# Patient Record
Sex: MALE | NOT HISPANIC OR LATINO | URBAN - METROPOLITAN AREA
[De-identification: names, ages, dates, MRNs, and addresses within clinical notes are randomized per-mention and may not be internally consistent; named-entity substitution may affect disease eponyms.]

---

## 2017-02-16 ENCOUNTER — EMERGENCY (EMERGENCY)
Facility: HOSPITAL | Age: 62
LOS: 0 days | Discharge: HOME | End: 2017-02-16
Attending: EMERGENCY MEDICINE
Payer: COMMERCIAL

## 2017-02-16 VITALS
SYSTOLIC BLOOD PRESSURE: 113 MMHG | HEART RATE: 63 BPM | OXYGEN SATURATION: 99 % | RESPIRATION RATE: 18 BRPM | TEMPERATURE: 99 F | DIASTOLIC BLOOD PRESSURE: 54 MMHG

## 2017-02-16 DIAGNOSIS — R58 HEMORRHAGE, NOT ELSEWHERE CLASSIFIED: ICD-10-CM

## 2017-02-16 DIAGNOSIS — Z04.1 ENCOUNTER FOR EXAMINATION AND OBSERVATION FOLLOWING TRANSPORT ACCIDENT: ICD-10-CM

## 2017-02-16 PROCEDURE — 99284 EMERGENCY DEPT VISIT MOD MDM: CPT

## 2017-02-16 PROCEDURE — 70450 CT HEAD/BRAIN W/O DYE: CPT | Mod: 26

## 2017-02-16 PROCEDURE — 76376 3D RENDER W/INTRP POSTPROCES: CPT | Mod: 26

## 2017-02-16 PROCEDURE — 72125 CT NECK SPINE W/O DYE: CPT | Mod: 26
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
## 2017-02-16 NOTE — ED PROVIDER NOTE - ENMT, MLM
Airway patent, Nasal mucosa clear. Mouth with normal mucosa. Throat has no vesicles, no oropharyngeal exudates and uvula is midline. + small 2cm ecchymosis, faint to left temporal. + faint ecchymosis to left cheek

## 2017-02-16 NOTE — ED ADULT NURSE NOTE - ADDITIONAL PRINTED INSTRUCTIONS GIVEN
discharged home, discharged instructions given to direct support staff to follow up wit primary md, she verbalized understanding of isdorin

## 2017-02-16 NOTE — ED PROVIDER NOTE - MEDICAL DECISION MAKING DETAILS
Pt well appearing. CT with no acute pathology + likely meningioma. d/w results with aid as well as written on instructions. Discussed results and outcome of testing with the patient.  Patient given copy of available results. Patient advised to please follow up with their PMD within the next 24 hours and return to the Emergency Department for worsening symptoms or any other concerns.

## 2017-08-29 ENCOUNTER — EMERGENCY (EMERGENCY)
Facility: HOSPITAL | Age: 62
LOS: 1 days | Discharge: HOME | End: 2017-08-29
Admitting: EMERGENCY MEDICINE
Payer: MEDICARE

## 2017-08-29 VITALS
TEMPERATURE: 98 F | OXYGEN SATURATION: 99 % | HEART RATE: 70 BPM | DIASTOLIC BLOOD PRESSURE: 73 MMHG | SYSTOLIC BLOOD PRESSURE: 114 MMHG | RESPIRATION RATE: 18 BRPM

## 2017-08-29 VITALS
SYSTOLIC BLOOD PRESSURE: 110 MMHG | DIASTOLIC BLOOD PRESSURE: 67 MMHG | HEART RATE: 71 BPM | RESPIRATION RATE: 18 BRPM | OXYGEN SATURATION: 98 %

## 2017-08-29 DIAGNOSIS — F43.21 ADJUSTMENT DISORDER WITH DEPRESSED MOOD: ICD-10-CM

## 2017-08-29 DIAGNOSIS — F70 MILD INTELLECTUAL DISABILITIES: ICD-10-CM

## 2017-08-29 PROCEDURE — 99283 EMERGENCY DEPT VISIT LOW MDM: CPT | Mod: GC

## 2017-08-29 PROCEDURE — 90792 PSYCH DIAG EVAL W/MED SRVCS: CPT | Mod: GC

## 2017-08-29 NOTE — ED BEHAVIORAL HEALTH ASSESSMENT NOTE - SUMMARY
62 year old male with reported history of mood disorder, psychotic disorder, PTSD, anxiety, mild intellectual disability, presenting with rectal injury from inserting his finger in his rectum, who endorsed SI. Patient does not have SI currently, although he has thoughts of injuring himself. He endorses intermittent sadness related to his situation, which caused him to injure himself by putting his finger in his rectum. He did not injure himself in a way that is lethal, and he has a good support network in the staff at the residence. While he relates his thought of hurting himself to the fact that he says staff is threatening him, he has reportedly made claims like this in the past, which were found to be unfounded. He explains this by stating he hears their voices in his R ear, which is not consistent with psychosis. The patient likely has an adjustment disorder with depressed mood related to his situation, causing him to cause minor injury to himself. He does not endorse a thought of injuring himself in a lethal way and has no thoughts of injuring others. He is not an acute risk to himself or others. 62 year old male with reported history of mood disorder, psychotic disorder, PTSD, anxiety, mild intellectual disability, presenting with rectal injury from inserting his finger in his rectum, who endorsed SI. Patient does not have SI currently, although he has thoughts of injuring himself. He endorses intermittent sadness related to his situation, which caused him to injure himself by putting his finger in his rectum. He did not injure himself in a way that is lethal, has no plan to injure himself in a lethal way, and he has a good support network in the staff at the residence. While he relates his thought of hurting himself to the fact that he says staff is threatening him, he has reportedly made claims like this in the past, which were found to be unfounded. He explains this by stating he hears their voices in his R ear, which is not consistent with psychosis. The patient likely has an adjustment disorder with depressed mood related to his situation, causing him to cause minor injury to himself. He does not endorse a thought of injuring himself in a lethal way and has no thoughts of injuring others. He is not an acute risk to himself or others. 62 year old male with reported history of mood disorder, psychotic disorder, PTSD, anxiety, mild intellectual disability, presenting with rectal injury from inserting his finger in his rectum, who endorsed SI. Patient does not have SI currently, although he has thoughts of injuring himself. He endorses intermittent sadness related to his situation, which caused him to injure himself by putting his finger in his rectum. He did not injure himself in a way that is lethal, has no plan to injure himself in a lethal way, and he has a good support network in the staff at the residence. While he relates his thought of hurting himself to the fact that he says staff is threatening him, he has reportedly made claims like this in the past, which were found to be unfounded. He explains this by stating he hears their voices in his R ear, which is not consistent with psychosis. The patient likely has an adjustment disorder with depressed mood related to his situation, causing him to cause minor injury to himself. He does not endorse a thought of injuring himself in a lethal way and has no thoughts of injuring others. He is not an acute risk to himself or others. Patient is not thought to be at risk from his staff. 62 year old male with reported history of mood disorder, psychotic disorder, PTSD, anxiety, mild intellectual disability, presenting with rectal injury from inserting his finger in his rectum, who endorsed SI. Patient does not have SI currently, although he has thoughts of slapping himself on the head. He endorses intermittent sadness related to his situation, which caused him to injure himself by putting his finger in his rectum. He did not injure himself in a way that is lethal, has no plan to injure himself in a lethal way, and he has a good support network in the staff at the residence. While he relates his thought of hurting himself to the fact that he says staff is threatening him, he has reportedly made claims like this in the past, which were found to be unfounded. He explains this by stating he hears their voices in his R ear, which is not consistent with psychosis. The patient likely has an adjustment disorder with depressed mood related to his situation, causing him to cause minor injury to himself. He does not endorse a thought of injuring himself in a lethal way and has no thoughts of injuring others. He is not an acute risk to himself or others. Patient is not thought to be at risk from his staff.

## 2017-08-29 NOTE — ED PROVIDER NOTE - RECTAL
Lacho almaguer : + small abrasion noted to superior rectal area without active bleeding. nontender to palpation.  no swelling or fluctuance.
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
## 2017-08-29 NOTE — ED PROVIDER NOTE - NOTES
Discussed with psychiatry consult service - Psychiatry attedning asking for patient to remain in intake for time being due to agitated patient in  - will see patient in intake. Discussed with psychiatry consult service - Psychiatry attedning asking for patient to remain in intake for time being due to agitated patient in  - will see patient in intake. Pt. currently with two group home staff members at bedside resting comfortably and is not agitated.

## 2017-08-29 NOTE — ED PROVIDER NOTE - OBJECTIVE STATEMENT
63 y/o male hs high functioning MR, mood disorder, psychotic disorder, anxiety presents to ED c/o rectal bleeding s/p scratching rectal area. As per staff ([facility] people services) who are familiar with patient - pt has history of scratching rectal area and causing abrasions and has happened many times before. bleeding has stopped since but sent in for eval. While evaluating patient and asked about why he scratched himself - patient responding saying "I want to die" and "I want to kill myself" but unable to elaborate and plan. denies HI or halucinations but patient poor historian. As per staff patient has stated these things in the past and say it is for attention but unable to elaborate for further collateral information.
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
## 2017-08-29 NOTE — ED BEHAVIORAL HEALTH ASSESSMENT NOTE - MEDICATIONS (PRESCRIPTIONS, DIRECTIONS)
Abilify 20 mg PO daily, Depakote 1000 mg PO BID, Celexa 40 mg PO daily, Cogentin 1 mg PO BID. c/w medication at prescribed by outpatient provider=Abilify 20 mg PO daily, Depakote 1000 mg PO BID, Celexa 40 mg PO daily, Cogentin 1 mg PO BID.

## 2017-08-29 NOTE — ED BEHAVIORAL HEALTH ASSESSMENT NOTE - DIFFERENTIAL
Adjustment disorder with depressed mood  Mild intellectual disability  Malingering  Unspecified psychosis

## 2017-08-29 NOTE — ED BEHAVIORAL HEALTH ASSESSMENT NOTE - RISK ASSESSMENT
The patient has chronic risk factors, including reported history of psychosis, reported history of mood disorder, reported history of PTSD, reported history of SIB. He has acute risk factors, including recent SIB and recent suicidal statement. He has protective factors, including supportive staff at his residence and future orientation. Patient does not have SI/HI/I/P currently and is not an acute risk to himself or others.

## 2017-08-29 NOTE — ED ADULT NURSE REASSESSMENT NOTE - GENERAL PATIENT STATE
anxious/cooperative/no change observed/pt states he is afraid and doesn't want to be with staff members from facility, pt also incontent of large amt of urine. clothing removed , pt cleaned and placed in  gown and pants, transported to  for constant observation, staff not with patient at this time. vs as documented

## 2017-08-29 NOTE — ED BEHAVIORAL HEALTH ASSESSMENT NOTE - CURRENT MEDICATION
Abilify 20 mg PO daily, Cogentin 1 mg PO BID, Celexa 40 mg PO daily, Depakote 1000 mg PO BID, acetazolamide 500 mg PPO BID, colace 100 mg PO TID, ferrous sulfate 325 mg pO daily.
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
## 2017-08-29 NOTE — ED BEHAVIORAL HEALTH ASSESSMENT NOTE - SAFETY PLAN DETAILS
Patient to return to the ED if he has any SI/HI/I/P. Patient to return to the ED if he has any emergency including SI/HI/I/P.

## 2017-11-09 ENCOUNTER — INPATIENT (INPATIENT)
Facility: HOSPITAL | Age: 62
LOS: 14 days | Discharge: NURSING, REHAB OR LONG-TERM CARE | End: 2017-11-24
Attending: HOSPITALIST | Admitting: HOSPITALIST
Payer: MEDICARE

## 2017-11-09 VITALS
HEART RATE: 80 BPM | OXYGEN SATURATION: 100 % | RESPIRATION RATE: 18 BRPM | SYSTOLIC BLOOD PRESSURE: 104 MMHG | DIASTOLIC BLOOD PRESSURE: 70 MMHG | TEMPERATURE: 98 F

## 2017-11-09 DIAGNOSIS — Z96.649 PRESENCE OF UNSPECIFIED ARTIFICIAL HIP JOINT: Chronic | ICD-10-CM

## 2017-11-09 LAB
ALBUMIN SERPL ELPH-MCNC: 3 G/DL — LOW (ref 3.3–5)
ALP SERPL-CCNC: 70 U/L — SIGNIFICANT CHANGE UP (ref 40–120)
ALT FLD-CCNC: 19 U/L — SIGNIFICANT CHANGE UP (ref 4–41)
AST SERPL-CCNC: 53 U/L — HIGH (ref 4–40)
BASOPHILS # BLD AUTO: 0.03 K/UL — SIGNIFICANT CHANGE UP (ref 0–0.2)
BASOPHILS NFR BLD AUTO: 0.3 % — SIGNIFICANT CHANGE UP (ref 0–2)
BILIRUB SERPL-MCNC: 0.9 MG/DL — SIGNIFICANT CHANGE UP (ref 0.2–1.2)
BUN SERPL-MCNC: 25 MG/DL — HIGH (ref 7–23)
CALCIUM SERPL-MCNC: 8.4 MG/DL — SIGNIFICANT CHANGE UP (ref 8.4–10.5)
CHLORIDE SERPL-SCNC: 108 MMOL/L — HIGH (ref 98–107)
CO2 SERPL-SCNC: 22 MMOL/L — SIGNIFICANT CHANGE UP (ref 22–31)
CREAT SERPL-MCNC: 0.8 MG/DL — SIGNIFICANT CHANGE UP (ref 0.5–1.3)
CRP SERPL-MCNC: 76.8 MG/L — HIGH
EOSINOPHIL # BLD AUTO: 0.08 K/UL — SIGNIFICANT CHANGE UP (ref 0–0.5)
EOSINOPHIL NFR BLD AUTO: 0.9 % — SIGNIFICANT CHANGE UP (ref 0–6)
ERYTHROCYTE [SEDIMENTATION RATE] IN BLOOD: 2 MM/HR — SIGNIFICANT CHANGE UP (ref 1–15)
GLUCOSE SERPL-MCNC: 96 MG/DL — SIGNIFICANT CHANGE UP (ref 70–99)
HCT VFR BLD CALC: 44.1 % — SIGNIFICANT CHANGE UP (ref 39–50)
HGB BLD-MCNC: 15.3 G/DL — SIGNIFICANT CHANGE UP (ref 13–17)
IMM GRANULOCYTES # BLD AUTO: 0.06 # — SIGNIFICANT CHANGE UP
IMM GRANULOCYTES NFR BLD AUTO: 0.7 % — SIGNIFICANT CHANGE UP (ref 0–1.5)
LYMPHOCYTES # BLD AUTO: 2.78 K/UL — SIGNIFICANT CHANGE UP (ref 1–3.3)
LYMPHOCYTES # BLD AUTO: 30.5 % — SIGNIFICANT CHANGE UP (ref 13–44)
MCHC RBC-ENTMCNC: 33.8 PG — SIGNIFICANT CHANGE UP (ref 27–34)
MCHC RBC-ENTMCNC: 34.7 % — SIGNIFICANT CHANGE UP (ref 32–36)
MCV RBC AUTO: 97.4 FL — SIGNIFICANT CHANGE UP (ref 80–100)
MONOCYTES # BLD AUTO: 1.13 K/UL — HIGH (ref 0–0.9)
MONOCYTES NFR BLD AUTO: 12.4 % — SIGNIFICANT CHANGE UP (ref 2–14)
NEUTROPHILS # BLD AUTO: 5.04 K/UL — SIGNIFICANT CHANGE UP (ref 1.8–7.4)
NEUTROPHILS NFR BLD AUTO: 55.2 % — SIGNIFICANT CHANGE UP (ref 43–77)
NRBC # FLD: 0 — SIGNIFICANT CHANGE UP
PLATELET # BLD AUTO: 138 K/UL — LOW (ref 150–400)
PMV BLD: 11.7 FL — SIGNIFICANT CHANGE UP (ref 7–13)
POTASSIUM SERPL-MCNC: 3.9 MMOL/L — SIGNIFICANT CHANGE UP (ref 3.5–5.3)
POTASSIUM SERPL-SCNC: 3.9 MMOL/L — SIGNIFICANT CHANGE UP (ref 3.5–5.3)
PROT SERPL-MCNC: 5.8 G/DL — LOW (ref 6–8.3)
RBC # BLD: 4.53 M/UL — SIGNIFICANT CHANGE UP (ref 4.2–5.8)
RBC # FLD: 13.8 % — SIGNIFICANT CHANGE UP (ref 10.3–14.5)
SODIUM SERPL-SCNC: 143 MMOL/L — SIGNIFICANT CHANGE UP (ref 135–145)
WBC # BLD: 9.12 K/UL — SIGNIFICANT CHANGE UP (ref 3.8–10.5)
WBC # FLD AUTO: 9.12 K/UL — SIGNIFICANT CHANGE UP (ref 3.8–10.5)

## 2017-11-09 PROCEDURE — 93971 EXTREMITY STUDY: CPT | Mod: 26,LT

## 2017-11-09 PROCEDURE — 73522 X-RAY EXAM HIPS BI 3-4 VIEWS: CPT | Mod: 26

## 2017-11-09 RX ORDER — SODIUM CHLORIDE 9 MG/ML
1000 INJECTION INTRAMUSCULAR; INTRAVENOUS; SUBCUTANEOUS ONCE
Qty: 0 | Refills: 0 | Status: COMPLETED | OUTPATIENT
Start: 2017-11-09 | End: 2017-11-09

## 2017-11-09 RX ORDER — ACETAMINOPHEN 500 MG
1000 TABLET ORAL ONCE
Qty: 0 | Refills: 0 | Status: COMPLETED | OUTPATIENT
Start: 2017-11-09 | End: 2017-11-09

## 2017-11-09 RX ORDER — MORPHINE SULFATE 50 MG/1
4 CAPSULE, EXTENDED RELEASE ORAL ONCE
Qty: 0 | Refills: 0 | Status: DISCONTINUED | OUTPATIENT
Start: 2017-11-09 | End: 2017-11-09

## 2017-11-09 RX ADMIN — MORPHINE SULFATE 4 MILLIGRAM(S): 50 CAPSULE, EXTENDED RELEASE ORAL at 23:21

## 2017-11-09 RX ADMIN — MORPHINE SULFATE 4 MILLIGRAM(S): 50 CAPSULE, EXTENDED RELEASE ORAL at 22:17

## 2017-11-09 RX ADMIN — Medication 1000 MILLIGRAM(S): at 23:43

## 2017-11-09 RX ADMIN — Medication 400 MILLIGRAM(S): at 23:21

## 2017-11-09 RX ADMIN — SODIUM CHLORIDE 1000 MILLILITER(S): 9 INJECTION INTRAMUSCULAR; INTRAVENOUS; SUBCUTANEOUS at 23:40

## 2017-11-09 NOTE — ED ADULT TRIAGE NOTE - CHIEF COMPLAINT QUOTE
EMS states" Patient  missed his day care and when [facility] people services went to check on him , patient is c/o pain to right leg." patient has mild intellectual disability, anxiety, PTSD, psychotic disorder.

## 2017-11-09 NOTE — ED ADULT NURSE NOTE - CHIEF COMPLAINT
The patient is a 62y Male BIBEMS from [facility] c/o hip pain. Pt is unable to fully communicate needs, able to say name and answer yes/no questions.

## 2017-11-09 NOTE — CONSULT NOTE ADULT - SUBJECTIVE AND OBJECTIVE BOX
Orthopaedic Surgery Consult Note    Patient is a 62y old  Male who presents with a chief complaint of right leg pain and inability to ambulate  HPI: 62 year old male with history of mood disorder, mental retardation, left hip fracture treated nonoperatively comes in with 1 day history of inability to ambulate.      PAST MEDICAL & SURGICAL HISTORY:  PTSD (post-traumatic stress disorder)  Constipation  Mood disorder  Mental retardation  S/P hip replacement    [] No significant past history as reviewed with the patient and family    FAMILY HISTORY:    [] Family history not pertinent as reviewed with the patient and family    SOCIAL HISTORY:    MEDICATIONS  (STANDING):    MEDICATIONS  (PRN):    Allergies    No Known Allergies    Intolerances        REVIEW OF SYSTEMS  [x] All review of systems negative except for those marked.  Systemic:	[] Fever		[] Chills		[] Night sweats		[] Fatigue	[] Other  [] Cardiovascular:  [] Pulmonary:  [] Renal/Urologic:  [] Gastrointestinal:  [] Metabolic:  [] Neurologic:  [] Hematologic:  [] ENT:  [] Ophthalmologic:  [] Musculoskeletal: see HPI    Vital Signs Last 24 Hrs  T(C): 36.7 (09 Nov 2017 18:05), Max: 36.7 (09 Nov 2017 18:05)  T(F): 98 (09 Nov 2017 18:05), Max: 98 (09 Nov 2017 18:05)  HR: 75 (09 Nov 2017 23:26) (75 - 85)  BP: 111/75 (09 Nov 2017 23:26) (104/70 - 126/61)  BP(mean): --  RR: 15 (09 Nov 2017 23:26) (15 - 18)  SpO2: 99% (09 Nov 2017 23:26) (98% - 100%)      PHYSICAL EXAM:  NAD  RLE:                           15.3   9.12  )-----------( 138      ( 09 Nov 2017 21:40 )             44.1     11-09    143  |  108<H>  |  25<H>  ----------------------------<  96  3.9   |  22  |  0.80    Ca    8.4      09 Nov 2017 21:40    TPro  5.8<L>  /  Alb  3.0<L>  /  TBili  0.9  /  DBili  x   /  AST  53<H>  /  ALT  19  /  AlkPhos  70  11-09    ESR: 2  CRP: 76.8      IMAGING STUDIES:  X-ray Pelvis/Right femur: old healed left femur fracture  62y Male with right leg pain Orthopaedic Surgery Consult Note    Patient is a 62y old  Male who presents with a chief complaint of right leg pain and inability to ambulate  HPI: 62 year old male with history of mood disorder, mental retardation, left hip fracture treated nonoperatively comes in with 1 day history of inability to ambulate.  History obtained from chart and ER physicians.  Patient refusing to respond to questions or commands.      PAST MEDICAL & SURGICAL HISTORY:  PTSD (post-traumatic stress disorder)  Constipation  Mood disorder  Mental retardation  S/P hip replacement    [] No significant past history as reviewed with the patient and family    FAMILY HISTORY:    [] Family history not pertinent as reviewed with the patient and family    SOCIAL HISTORY:    MEDICATIONS  (STANDING):    MEDICATIONS  (PRN):    Allergies    No Known Allergies    Intolerances        REVIEW OF SYSTEMS  [x] All review of systems negative except for those marked.  Systemic:	[] Fever		[] Chills		[] Night sweats		[] Fatigue	[] Other  [] Cardiovascular:  [] Pulmonary:  [] Renal/Urologic:  [] Gastrointestinal:  [] Metabolic:  [] Neurologic:  [] Hematologic:  [] ENT:  [] Ophthalmologic:  [] Musculoskeletal: see HPI    Vital Signs Last 24 Hrs  T(C): 36.7 (09 Nov 2017 18:05), Max: 36.7 (09 Nov 2017 18:05)  T(F): 98 (09 Nov 2017 18:05), Max: 98 (09 Nov 2017 18:05)  HR: 75 (09 Nov 2017 23:26) (75 - 85)  BP: 111/75 (09 Nov 2017 23:26) (104/70 - 126/61)  BP(mean): --  RR: 15 (09 Nov 2017 23:26) (15 - 18)  SpO2: 99% (09 Nov 2017 23:26) (98% - 100%)      PHYSICAL EXAM:  NAD  B/L LE: grimacing with ROM of hips or knees  No effusion of knees.  NO TTP hips/knees  Unable to assess motor/neuro exam secondary to patient not following commands.                        15.3   9.12  )-----------( 138      ( 09 Nov 2017 21:40 )             44.1     11-09    143  |  108<H>  |  25<H>  ----------------------------<  96  3.9   |  22  |  0.80    Ca    8.4      09 Nov 2017 21:40    TPro  5.8<L>  /  Alb  3.0<L>  /  TBili  0.9  /  DBili  x   /  AST  53<H>  /  ALT  19  /  AlkPhos  70  11-09    ESR: 2  CRP: 76.8      IMAGING STUDIES:  X-ray Pelvis/Right femur: old healed left femur fracture, no acute fractures or dislocations  62y Male with right leg pain  Low concern for septic hip given isolated CRP elevation with no fevers, elevated ESR or wbc  PT/OT/OOB  Pain Control  No acute surgical intervention.  Care per primary team.

## 2017-11-09 NOTE — ED ADULT NURSE NOTE - CHIEF COMPLAINT QUOTE
EMS states" Patient  missed his day care and when [facility] people services went to check on him , patient is c/o pain to right leg." patient has mild intellectual disability, anxiety, PTSD, psychotic disorder.

## 2017-11-09 NOTE — ED PROVIDER NOTE - ATTENDING CONTRIBUTION TO CARE
61 y/o M w/ a PMHx of mild intellectual disability, mood disorder, psychotic disorder, PTSD, anxiety, and glaucoma sent from group home with R hip pain.   PE: Limited exam, however, no exquisite ttp on exam and no ecchymosis, warmth, or erythema were appreciated; +pain elicited with R hip flexion and flexion/extension of R knee.  -xray showing chronic changes and ortho consulted who has a low concern for septic hip and who recommended no acute surgical intervention.  Will admit for further work-up.  I performed a history and physical exam of the patient and discussed their management with the advanced care provider. I reviewed the advanced care provider's note and agree with the documented findings and plan of care. My medical decision making and objective findings are found above.

## 2017-11-09 NOTE — ED PROVIDER NOTE - NONTENDER LOCATION
left lower quadrant/umbilical/right costovertebral angle/left upper quadrant/right upper quadrant/suprapubic/right lower quadrant/periumbilical/left costovertebral angle

## 2017-11-09 NOTE — ED PROVIDER NOTE - CHPI ED SYMPTOMS NEG
no stiffness/no tingling/no bruising/no fever/no deformity/no weakness/no numbness/no abrasion/no back pain

## 2017-11-09 NOTE — ED PROVIDER NOTE - PROGRESS NOTE DETAILS
IJEOMA Stevens: received sign out from IJEOMA Delong to follow up labs, x-ray and u/s.  Pt returned from u/s pt sleeping, responding to stimuli, vss; pt sleeping likely secondary to morphine.  Pt to be admitted to hospitalist.  Orthopedics to see patient. IJEOMA Stevens: received sign out from IJEOMA Delong to follow up labs, x-ray and u/s.  Pt returned from u/s pt sleeping, responding to stimuli, vss; pt sleeping likely secondary to morphine.  Aide at bedside reports similar episodes in the past when sleeping.  Pt to be admitted to hospitalist.  Orthopedics to see patient. IJEOMA Stevens: pt admitted to hospitalist Dr. Samayoa.  MAR notified.

## 2017-11-09 NOTE — ED PROVIDER NOTE - MEDICAL DECISION MAKING DETAILS
Pt is a 61 y/o M nonsmoker PMHx mild intellectual disability, mood disorder, psychotic disorder, h/o left hip replacement, anxiety p/w right hip and leg pain today -- hip pain, r/o fracture, possible arthritis -- labs, xray, pain control, esr, reassess, if unable to ambulate, admit

## 2017-11-09 NOTE — ED ADULT NURSE NOTE - OBJECTIVE STATEMENT
Received pt in room intake 7, pt A&Ox1, respirations even and unlabored b/l. Pt arrived to room, urinated on self, pt changed to gown and cleaned. Skin dry and intact. IVL 20g Angiocath placed on right AC. Labs sent. Pt taken to xray. Will continue to monitor. Received pt in room intake 7, pt A&Ox1, respirations even and unlabored b/l. Pt arrived to room, urinated on self, pt changed to gown and cleaned. Skin dry and intact. Pt tremulous, unable to fully extend upper extremities b/l. IVL 20g Angiocath placed on right AC. Labs sent. Pt taken to xray. Will continue to monitor. Received pt in room intake 7, pt A&Ox1, respirations even and unlabored b/l. Pt arrived to room, urinated on self, pt changed to gown and cleaned. Skin dry and intact. Pt tremulous, unable to fully extend upper extremities b/l. IVL 20g Angiocath placed on right AC. Labs sent. Pt taken to xray. Staff from group home at bedside. Will continue to monitor. Received pt in room intake 7, pt A&Ox1, respirations even and unlabored b/l. Pt arrived to room, urinated on self, pt changed to gown and cleaned. Blanchable redness on sacrum and buttocks. Pt tremulous, unable to fully extend upper extremities b/l. IVL 20g Angiocath placed on right AC. Labs sent. Pt taken to xray. Staff from group home at bedside. Will continue to monitor.

## 2017-11-10 DIAGNOSIS — M79.604 PAIN IN RIGHT LEG: ICD-10-CM

## 2017-11-10 DIAGNOSIS — K59.00 CONSTIPATION, UNSPECIFIED: ICD-10-CM

## 2017-11-10 DIAGNOSIS — M25.551 PAIN IN RIGHT HIP: ICD-10-CM

## 2017-11-10 DIAGNOSIS — F39 UNSPECIFIED MOOD [AFFECTIVE] DISORDER: ICD-10-CM

## 2017-11-10 DIAGNOSIS — H40.9 UNSPECIFIED GLAUCOMA: ICD-10-CM

## 2017-11-10 DIAGNOSIS — Z29.9 ENCOUNTER FOR PROPHYLACTIC MEASURES, UNSPECIFIED: ICD-10-CM

## 2017-11-10 LAB
ALBUMIN SERPL ELPH-MCNC: 2.6 G/DL — LOW (ref 3.3–5)
ALP SERPL-CCNC: 64 U/L — SIGNIFICANT CHANGE UP (ref 40–120)
ALT FLD-CCNC: 18 U/L — SIGNIFICANT CHANGE UP (ref 4–41)
AST SERPL-CCNC: 48 U/L — HIGH (ref 4–40)
BILIRUB SERPL-MCNC: 0.7 MG/DL — SIGNIFICANT CHANGE UP (ref 0.2–1.2)
BUN SERPL-MCNC: 23 MG/DL — SIGNIFICANT CHANGE UP (ref 7–23)
CALCIUM SERPL-MCNC: 8 MG/DL — LOW (ref 8.4–10.5)
CHLORIDE SERPL-SCNC: 109 MMOL/L — HIGH (ref 98–107)
CK SERPL-CCNC: 500 U/L — HIGH (ref 30–200)
CO2 SERPL-SCNC: 22 MMOL/L — SIGNIFICANT CHANGE UP (ref 22–31)
CREAT SERPL-MCNC: 0.69 MG/DL — SIGNIFICANT CHANGE UP (ref 0.5–1.3)
GLUCOSE SERPL-MCNC: 78 MG/DL — SIGNIFICANT CHANGE UP (ref 70–99)
MAGNESIUM SERPL-MCNC: 2 MG/DL — SIGNIFICANT CHANGE UP (ref 1.6–2.6)
PHOSPHATE SERPL-MCNC: 3.7 MG/DL — SIGNIFICANT CHANGE UP (ref 2.5–4.5)
POTASSIUM SERPL-MCNC: 3.1 MMOL/L — LOW (ref 3.5–5.3)
POTASSIUM SERPL-SCNC: 3.1 MMOL/L — LOW (ref 3.5–5.3)
PROT SERPL-MCNC: 5.1 G/DL — LOW (ref 6–8.3)
SODIUM SERPL-SCNC: 143 MMOL/L — SIGNIFICANT CHANGE UP (ref 135–145)

## 2017-11-10 PROCEDURE — 72192 CT PELVIS W/O DYE: CPT | Mod: 26

## 2017-11-10 PROCEDURE — 99223 1ST HOSP IP/OBS HIGH 75: CPT | Mod: GC

## 2017-11-10 PROCEDURE — 73502 X-RAY EXAM HIP UNI 2-3 VIEWS: CPT | Mod: 26,LT

## 2017-11-10 PROCEDURE — 73700 CT LOWER EXTREMITY W/O DYE: CPT | Mod: 26,RT

## 2017-11-10 PROCEDURE — 73552 X-RAY EXAM OF FEMUR 2/>: CPT | Mod: 26,RT

## 2017-11-10 RX ORDER — ARIPIPRAZOLE 15 MG/1
20 TABLET ORAL DAILY
Qty: 0 | Refills: 0 | Status: DISCONTINUED | OUTPATIENT
Start: 2017-11-10 | End: 2017-11-24

## 2017-11-10 RX ORDER — CHOLECALCIFEROL (VITAMIN D3) 125 MCG
1000 CAPSULE ORAL DAILY
Qty: 0 | Refills: 0 | Status: DISCONTINUED | OUTPATIENT
Start: 2017-11-10 | End: 2017-11-24

## 2017-11-10 RX ORDER — BENZTROPINE MESYLATE 1 MG
1 TABLET ORAL
Qty: 0 | Refills: 0 | Status: DISCONTINUED | OUTPATIENT
Start: 2017-11-10 | End: 2017-11-24

## 2017-11-10 RX ORDER — DOCUSATE SODIUM 100 MG
100 CAPSULE ORAL THREE TIMES A DAY
Qty: 0 | Refills: 0 | Status: DISCONTINUED | OUTPATIENT
Start: 2017-11-10 | End: 2017-11-24

## 2017-11-10 RX ORDER — ACETAZOLAMIDE 250 MG/1
500 TABLET ORAL
Qty: 0 | Refills: 0 | Status: DISCONTINUED | OUTPATIENT
Start: 2017-11-10 | End: 2017-11-24

## 2017-11-10 RX ORDER — TIMOLOL 0.5 %
1 DROPS OPHTHALMIC (EYE)
Qty: 0 | Refills: 0 | Status: DISCONTINUED | OUTPATIENT
Start: 2017-11-10 | End: 2017-11-24

## 2017-11-10 RX ORDER — BRIMONIDINE TARTRATE 2 MG/MG
1 SOLUTION/ DROPS OPHTHALMIC THREE TIMES A DAY
Qty: 0 | Refills: 0 | Status: DISCONTINUED | OUTPATIENT
Start: 2017-11-10 | End: 2017-11-24

## 2017-11-10 RX ORDER — DIVALPROEX SODIUM 500 MG/1
1000 TABLET, DELAYED RELEASE ORAL
Qty: 0 | Refills: 0 | Status: DISCONTINUED | OUTPATIENT
Start: 2017-11-10 | End: 2017-11-24

## 2017-11-10 RX ORDER — DIVALPROEX SODIUM 500 MG/1
1000 TABLET, DELAYED RELEASE ORAL
Qty: 0 | Refills: 0 | Status: DISCONTINUED | OUTPATIENT
Start: 2017-11-10 | End: 2017-11-10

## 2017-11-10 RX ORDER — SODIUM CHLORIDE 9 MG/ML
1000 INJECTION INTRAMUSCULAR; INTRAVENOUS; SUBCUTANEOUS
Qty: 0 | Refills: 0 | Status: DISCONTINUED | OUTPATIENT
Start: 2017-11-10 | End: 2017-11-15

## 2017-11-10 RX ORDER — POTASSIUM CHLORIDE 20 MEQ
40 PACKET (EA) ORAL ONCE
Qty: 0 | Refills: 0 | Status: DISCONTINUED | OUTPATIENT
Start: 2017-11-10 | End: 2017-11-10

## 2017-11-10 RX ORDER — FERROUS SULFATE 325(65) MG
325 TABLET ORAL DAILY
Qty: 0 | Refills: 0 | Status: DISCONTINUED | OUTPATIENT
Start: 2017-11-10 | End: 2017-11-10

## 2017-11-10 RX ORDER — HEPARIN SODIUM 5000 [USP'U]/ML
5000 INJECTION INTRAVENOUS; SUBCUTANEOUS EVERY 8 HOURS
Qty: 0 | Refills: 0 | Status: DISCONTINUED | OUTPATIENT
Start: 2017-11-10 | End: 2017-11-24

## 2017-11-10 RX ORDER — LATANOPROST 0.05 MG/ML
1 SOLUTION/ DROPS OPHTHALMIC; TOPICAL AT BEDTIME
Qty: 0 | Refills: 0 | Status: DISCONTINUED | OUTPATIENT
Start: 2017-11-10 | End: 2017-11-10

## 2017-11-10 RX ORDER — TIMOLOL 0.5 %
1 DROPS OPHTHALMIC (EYE)
Qty: 0 | Refills: 0 | COMMUNITY

## 2017-11-10 RX ORDER — ACETAMINOPHEN 500 MG
650 TABLET ORAL EVERY 6 HOURS
Qty: 0 | Refills: 0 | Status: DISCONTINUED | OUTPATIENT
Start: 2017-11-10 | End: 2017-11-24

## 2017-11-10 RX ORDER — CITALOPRAM 10 MG/1
40 TABLET, FILM COATED ORAL DAILY
Qty: 0 | Refills: 0 | Status: DISCONTINUED | OUTPATIENT
Start: 2017-11-10 | End: 2017-11-24

## 2017-11-10 RX ORDER — POTASSIUM CHLORIDE 20 MEQ
40 PACKET (EA) ORAL EVERY 4 HOURS
Qty: 0 | Refills: 0 | Status: COMPLETED | OUTPATIENT
Start: 2017-11-10 | End: 2017-11-10

## 2017-11-10 RX ORDER — BENZTROPINE MESYLATE 1 MG
1 TABLET ORAL
Qty: 0 | Refills: 0 | Status: DISCONTINUED | OUTPATIENT
Start: 2017-11-10 | End: 2017-11-10

## 2017-11-10 RX ORDER — OXYCODONE AND ACETAMINOPHEN 5; 325 MG/1; MG/1
1 TABLET ORAL EVERY 6 HOURS
Qty: 0 | Refills: 0 | Status: DISCONTINUED | OUTPATIENT
Start: 2017-11-10 | End: 2017-11-16

## 2017-11-10 RX ORDER — SENNA PLUS 8.6 MG/1
2 TABLET ORAL AT BEDTIME
Qty: 0 | Refills: 0 | Status: DISCONTINUED | OUTPATIENT
Start: 2017-11-10 | End: 2017-11-24

## 2017-11-10 RX ADMIN — Medication 40 MILLIEQUIVALENT(S): at 13:00

## 2017-11-10 RX ADMIN — ARIPIPRAZOLE 20 MILLIGRAM(S): 15 TABLET ORAL at 14:47

## 2017-11-10 RX ADMIN — HEPARIN SODIUM 5000 UNIT(S): 5000 INJECTION INTRAVENOUS; SUBCUTANEOUS at 06:24

## 2017-11-10 RX ADMIN — SENNA PLUS 2 TABLET(S): 8.6 TABLET ORAL at 22:29

## 2017-11-10 RX ADMIN — HEPARIN SODIUM 5000 UNIT(S): 5000 INJECTION INTRAVENOUS; SUBCUTANEOUS at 22:30

## 2017-11-10 RX ADMIN — OXYCODONE AND ACETAMINOPHEN 1 TABLET(S): 5; 325 TABLET ORAL at 16:15

## 2017-11-10 RX ADMIN — OXYCODONE AND ACETAMINOPHEN 1 TABLET(S): 5; 325 TABLET ORAL at 15:17

## 2017-11-10 RX ADMIN — ACETAZOLAMIDE 500 MILLIGRAM(S): 250 TABLET ORAL at 18:31

## 2017-11-10 RX ADMIN — BRIMONIDINE TARTRATE 1 DROP(S): 2 SOLUTION/ DROPS OPHTHALMIC at 22:30

## 2017-11-10 RX ADMIN — Medication 1 DROP(S): at 18:31

## 2017-11-10 RX ADMIN — Medication 10 MILLIGRAM(S): at 22:29

## 2017-11-10 RX ADMIN — Medication 100 MILLIGRAM(S): at 22:29

## 2017-11-10 RX ADMIN — CITALOPRAM 40 MILLIGRAM(S): 10 TABLET, FILM COATED ORAL at 13:00

## 2017-11-10 RX ADMIN — DIVALPROEX SODIUM 1000 MILLIGRAM(S): 500 TABLET, DELAYED RELEASE ORAL at 09:08

## 2017-11-10 RX ADMIN — BRIMONIDINE TARTRATE 1 DROP(S): 2 SOLUTION/ DROPS OPHTHALMIC at 14:47

## 2017-11-10 RX ADMIN — DIVALPROEX SODIUM 1000 MILLIGRAM(S): 500 TABLET, DELAYED RELEASE ORAL at 18:31

## 2017-11-10 RX ADMIN — SODIUM CHLORIDE 100 MILLILITER(S): 9 INJECTION INTRAMUSCULAR; INTRAVENOUS; SUBCUTANEOUS at 12:57

## 2017-11-10 RX ADMIN — OXYCODONE AND ACETAMINOPHEN 1 TABLET(S): 5; 325 TABLET ORAL at 23:15

## 2017-11-10 RX ADMIN — Medication 1 MILLIGRAM(S): at 09:08

## 2017-11-10 RX ADMIN — Medication 40 MILLIEQUIVALENT(S): at 09:16

## 2017-11-10 RX ADMIN — Medication 1 MILLIGRAM(S): at 18:31

## 2017-11-10 RX ADMIN — HEPARIN SODIUM 5000 UNIT(S): 5000 INJECTION INTRAVENOUS; SUBCUTANEOUS at 13:00

## 2017-11-10 RX ADMIN — Medication 650 MILLIGRAM(S): at 10:55

## 2017-11-10 RX ADMIN — OXYCODONE AND ACETAMINOPHEN 1 TABLET(S): 5; 325 TABLET ORAL at 22:34

## 2017-11-10 RX ADMIN — Medication 650 MILLIGRAM(S): at 09:56

## 2017-11-10 RX ADMIN — Medication 100 MILLIGRAM(S): at 13:00

## 2017-11-10 RX ADMIN — Medication 1000 UNIT(S): at 14:47

## 2017-11-10 NOTE — H&P ADULT - PROBLEM SELECTOR PLAN 3
- As noted above, patient's list of eye drops were provided from group home, but potency and frequency were not provided.  - Will call pt's group home in the AM and will start patient's eye drops once missing information is clarified.

## 2017-11-10 NOTE — PROVIDER CONTACT NOTE (OTHER) - ASSESSMENT
RN attempted to have patient urinated by having stand at bedside, dangle, and warm compresses on bladder with no output.  bladder scan showed 364ml in bladder.

## 2017-11-10 NOTE — PROVIDER CONTACT NOTE (OTHER) - SITUATION
RN unsure when patient last urinated, per report was incontinent in the morning but no known output since.

## 2017-11-10 NOTE — PHYSICAL THERAPY INITIAL EVALUATION ADULT - PERTINENT HX OF CURRENT PROBLEM, REHAB EVAL
Pt is a 62 year old male with mental retardation, from a group home and has pain in the right lower extremity

## 2017-11-10 NOTE — H&P ADULT - PROBLEM SELECTOR PLAN 4
- Per paperwork from patient's group home, patient has a history of constipation.  - Will start Senna/Colace

## 2017-11-10 NOTE — H&P ADULT - PROBLEM SELECTOR PLAN 1
- Patient presents to the ED from his group home with complaints of right hip pain and inability to bear weight. No exquisite point tenderness on exam and no ecchymosis, warmth, or erythema were appreciated. - Patient presents to the ED from his group home with complaints of right hip pain and inability to bear weight. Exam was limited; however, there was no exquisite point tenderness on exam and no ecchymosis, warmth, or erythema were appreciated. There was pain elicited with R hip flexion and flexion/extension of R knee. Labs were only significant for an elevated CRP (76.8), but ESR was WNL (2). Prelim read on X-ray showed chronic appearing lesser trochanter fracture is noted with severe R knee joint arthrosis. Patient evaluated by Ortho in the ED who had a low concern for septic hip and who recommended no acute surgical intervention. Unclear if patient's pain is 2/2 severe OA vs prior fracture vs ? myositis.  - Will follow-up final X-ray report. If equivocal, will consider further imaging  - Will check a CK to r/o possible myositis  - Tylenol PRN for pain  - PT consult placed

## 2017-11-10 NOTE — H&P ADULT - NSHPREVIEWOFSYSTEMS_GEN_ALL_CORE
REVIEW OF SYSTEMS **** (Limited as patient grossly noncommunicative) REVIEW OF SYSTEMS **** (Limited as patient grossly noncommunicative) ****  ROS + for right hip pain, RLE pian, inability to bear weight  ROS - for fevers, chills, numbness, weakness, rash, or recent falls REVIEW OF SYSTEMS **** (Limited as patient grossly noncommunicative) ****  ROS + for right hip pain, RLE pian, inability to bear weight  ROS - for fevers, chills, chest pain, abdominal pain, numbness, weakness, rash, or recent falls

## 2017-11-10 NOTE — H&P ADULT - ATTENDING COMMENTS
Patient was seen and evaluated, agree with above with the follow additions    PE: Cardiac S1-S2 with BELINDA II/VI    A/P 62 y.o. man with multiple medical comorbidities now with right hip pain of unclear etiology   - In view of severe right hip pain and negative x-ray, obtain CT of the pelvis to assess for fracture   - If negative, obtain PT evaluation.

## 2017-11-10 NOTE — H&P ADULT - NSHPPHYSICALEXAM_GEN_ALL_CORE
Vital Signs Last 24 Hrs  T(C): 36.8 (10 Nov 2017 03:46), Max: 36.8 (10 Nov 2017 03:46)  T(F): 98.3 (10 Nov 2017 03:46), Max: 98.3 (10 Nov 2017 03:46)  HR: 72 (10 Nov 2017 03:46) (66 - 85)  BP: 121/73 (10 Nov 2017 03:46) (104/70 - 126/61)  BP(mean): --  RR: 16 (10 Nov 2017 03:46) (15 - 18)  SpO2: 100% (10 Nov 2017 03:46) (98% - 100%)    PHYSICAL EXAM:  Constitutional:  Eyes:  ENMT:  Neck:  Breasts:  Back:  Respiratory:  Cardiovascular:  Gastrointestinal:  Genitourinary:  Rectal:  Extremities:  Vascular:  Neurological:  Skin:  Lymph Nodes:  Musculoskeletal:  Psychiatric: Vital Signs Last 24 Hrs  T(C): 36.8 (10 Nov 2017 03:46), Max: 36.8 (10 Nov 2017 03:46)  T(F): 98.3 (10 Nov 2017 03:46), Max: 98.3 (10 Nov 2017 03:46)  HR: 72 (10 Nov 2017 03:46) (66 - 85)  BP: 121/73 (10 Nov 2017 03:46) (104/70 - 126/61)  BP(mean): --  RR: 16 (10 Nov 2017 03:46) (15 - 18)  SpO2: 100% (10 Nov 2017 03:46) (98% - 100%)    PHYSICAL EXAM: (Limited as patient would only occasionally follow commands)  Constitutional: NAD  HEENT: NC/AT  Neck: Supple  Respiratory: Grossly clear anteriorly, No wheeze appreciated  Cardiovascular: RRR; +S1/S2  Gastrointestinal: +BS, Soft, NT, ND, No rigidity  Genitourinary: No Larry, No suprapubic TTP  Extremities: Mild RLE nonpitting edema  Neurological:   Skin:  Lymph Nodes:  Musculoskeletal:  Psychiatric: Vital Signs Last 24 Hrs  T(C): 36.8 (10 Nov 2017 03:46), Max: 36.8 (10 Nov 2017 03:46)  T(F): 98.3 (10 Nov 2017 03:46), Max: 98.3 (10 Nov 2017 03:46)  HR: 72 (10 Nov 2017 03:46) (66 - 85)  BP: 121/73 (10 Nov 2017 03:46) (104/70 - 126/61)  BP(mean): --  RR: 16 (10 Nov 2017 03:46) (15 - 18)  SpO2: 100% (10 Nov 2017 03:46) (98% - 100%)    PHYSICAL EXAM: (Limited as patient would only occasionally follow commands)  Constitutional: NAD  HEENT: NC/AT  Neck: Supple  Respiratory: Grossly clear anteriorly, No wheeze appreciated  Cardiovascular: RRR; +S1/S2  Gastrointestinal: +BS, Soft, NT, ND, No rigidity  Genitourinary: No Larry, No suprapubic TTP  Extremities: Mild RLE nonpitting edema  Neurological: Occasionally follows commands, Movement seen in all extremities, Mild resting tremor of UEs  Skin: Warm and dry, No ecchymosis or erythema appreciated near R hip or RLE  Musculoskeletal: Difficult to assess as patient was unable to follow commands; however, there was no exquisite TTP of bilateral hips or LEs. Patient reported the most pain when attempts were made to flex his R hip, He also reported pain when trying to bend his R knee.  Psychiatric: Unable to assess

## 2017-11-10 NOTE — H&P ADULT - NSHPLABSRESULTS_GEN_ALL_CORE
CBC Full  -  ( 09 Nov 2017 21:40 )  WBC Count : 9.12 K/uL  Hemoglobin : 15.3 g/dL  Hematocrit : 44.1 %  Platelet Count - Automated : 138 K/uL  Mean Cell Volume : 97.4 fL  Mean Cell Hemoglobin : 33.8 pg  Mean Cell Hemoglobin Concentration : 34.7 %  Auto Neutrophil # : 5.04 K/uL  Auto Lymphocyte # : 2.78 K/uL  Auto Monocyte # : 1.13 K/uL  Auto Eosinophil # : 0.08 K/uL  Auto Basophil # : 0.03 K/uL  Auto Neutrophil % : 55.2 %  Auto Lymphocyte % : 30.5 %  Auto Monocyte % : 12.4 %  Auto Eosinophil % : 0.9 %  Auto Basophil % : 0.3 %    11-09    143  |  108<H>  |  25<H>  ----------------------------<  96  3.9   |  22  |  0.80    Ca    8.4      09 Nov 2017 21:40    TPro  5.8<L>  /  Alb  3.0<L>  /  TBili  0.9  /  DBili  x   /  AST  53<H>  /  ALT  19  /  AlkPhos  70  11-09    EKG: Ordered    Hip/Femur X-ray (Prelim):  Left hip and left femur: Chronic appearing fracture of the subtrochanteric region is noted with posttraumatic osseous changes. Acute component is not excluded. If concern for acute fracture persists, cross-sectional imaging may be obtained. Severe left knee joint arthrosis is noted.    Right hip and right femur:  Chronic appearing lesser trochanter fracture is noted. No acute fracture or dislocation is identified. There is a small right knee joint effusion. Severe right knee joint arthrosis is noted.    Pelvis:  Bilateral hip findings as above. Evaluation of the sacrum is limited by bowel gas and stool. Degenerative changes of the spine are noted.    RLE U/S: No  evidence  of  right  lower  extremity  deep  venous  thrombosis  in  the  studied veins. Right  posterior  tibial  and  peroneal  veins  are  not  visualized.

## 2017-11-10 NOTE — H&P ADULT - HISTORY OF PRESENT ILLNESS
Patient is a 61 y/o M w/ a PMHx of mild intellectual disability, mood disorder, psychotic disorder, PTSD, anxiety, and glaucoma who presented to the ED from a group home with right hip pain. History was very limited from patient at time of interview as he was grossly noncommunicative.  Attempted to call patient's group home for further history, but only reached voicemail. Much of the history was obtained per chart review. Patient has had severe right hip pain which is exacerbated with movement. Patient is unable to walk/bear weight 2/2 pain. No reported history of falls. Per documents from patient's group home, patient ambulates with a walker at baseline. However, due to pain, patient has been refusing to ambulate. There have no reported fevers, chills, numbness, weakness, or rash. Of note, patient has a history of a left hip fracture which was treated nonoperatively.    In the ED, pt's VS were: T = 98.1F, HR = 80, BP = 104/70, RR = 18, O2 Sat = 100% on RA. Labs were significant for a CRP of 76.8, but ESR was WNL (2). Patient was given 1L NS, IV Tylenol 1g x1, and IV Morphine 4mg x1. Patient was then admitted to Medicine for further management. Patient is a 63 y/o M w/ a PMHx of mild intellectual disability, mood disorder, psychotic disorder, PTSD, anxiety, and glaucoma who presented to the ED from a group home with right hip pain. History was very limited from patient at time of interview as he was grossly noncommunicative.  Attempted to call patient's group home for further history, but only reached voicemail. Much of the history was obtained per chart review. Patient has had severe right hip pain which is exacerbated with movement. Patient is unable to walk/bear weight 2/2 pain. No reported history of falls. Per documents from patient's group home, patient ambulates with a walker at baseline. However, due to pain, patient has been refusing to ambulate. There have been no reported fevers, chills, numbness, weakness, or rash. During the encounter, patient would point towards the direction of his hip and state that he has "pain here." When asked to localize the pain further, patient was unable to do so. He would occasionally nod or shake his head during the interview. He shook his head "no" when asked if he had any chest pain or abdominal pain. Of note, patient has a history of a left hip fracture which was treated nonoperatively.    In the ED, pt's VS were: T = 98.1F, HR = 80, BP = 104/70, RR = 18, O2 Sat = 100% on RA. Labs were significant for a CRP of 76.8, but ESR was WNL (2). Patient was given 1L NS, IV Tylenol 1g x1, and IV Morphine 4mg x1. Patient was then admitted to Medicine for further management.

## 2017-11-10 NOTE — H&P ADULT - ASSESSMENT
Patient is a 61 y/o M w/ a PMHx of mild intellectual disability, mood disorder, psychotic disorder, PTSD, anxiety, and glaucoma who presented to the ED from a group home with right hip pain possibly 2/2 severe OA vs ? myositis.

## 2017-11-10 NOTE — H&P ADULT - PROBLEM SELECTOR PLAN 2
- Patient is on Cogentin, Citalopram, and Depakote DR at home. Of note, group home sent a list of patient's medications; however, the doses of the medications were not included. Per chart review, patient was on Cogentin 1mg BID, Citalopram 40mg QD, and Depakote DR 1000mg BID as last as 8/2017.   - Will c/w these doses for now and will call patient's group home for further clarification  - Consider Psych consult in the AM for assistance with medication management

## 2017-11-10 NOTE — CHART NOTE - NSCHARTNOTEFT_GEN_A_CORE
Pt admitted overnight, H and P as per computer entry   In brief ,this is a  61 y/o M w/ a PMHx of mild intellectual disability, mood disorder, psychotic disorder, PTSD, anxiety, and glaucoma who presented to the ED from a group home with right hip pain.  Pt poor historian, endorses pain in th right hip and right groin area with restricted ROM  Xray of the hip and femur showed deformity of the left hip secondary to an old femoral neck fracture with   varus deformity. Severe left knee joint arthrosis is noted. Right hip and right femur with chronic appearing lesser trochanter fracture is noted. No acute fracture   or dislocation is identified. There is a moderate right knee joint effusion. Severe right knee joint arthrosis is noted.  pt was evaluated by Orthopedics and no surgical intervention was advised    Plan-   Pain control- pt on Tylenol, will start Percoet  IR PRN for moderate to severe pain  will obtain additional imaging- CT pelvis and right hip to evaluate for fracture  I called group home and spoke to supervisor- As per supervisor, pt was sent in because he was too tired and not eating much. No recent falls  supervisor to fax records to [phone] ( Hospitalist office)  continue to monitor closely Pt admitted overnight, H and P as per computer entry   In brief ,this is a  61 y/o M w/ a PMHx of mild intellectual disability, mood disorder, psychotic disorder, PTSD, anxiety, and glaucoma who presented to the ED from a group home with right hip pain.  Pt poor historian, endorses pain in th right hip and right groin area with restricted ROM  Xray of the hip and femur showed deformity of the left hip secondary to an old femoral neck fracture with   varus deformity. Severe left knee joint arthrosis is noted. Right hip and right femur with chronic appearing lesser trochanter fracture is noted. No acute fracture   or dislocation is identified. There is a moderate right knee joint effusion. Severe right knee joint arthrosis is noted.  pt was evaluated by Orthopedics and no surgical intervention was advised    Plan-   Pain control- pt on Tylenol, will start Percoet  IR PRN for moderate to severe pain  will obtain additional imaging- CT pelvis and right hip to evaluate for fracture  I called group home and spoke to supervisor- As per supervisor, pt was sent in because he was too tired and not eating much. No recent falls  supervisor to fax records to [phone] ( Hospitalist office)  CK elevated, will start IVF, monitor CK  Hyopokalemia - replete  Trend LFT - elevated likely due to elevated CK  continue to monitor closely Pt admitted overnight, H and P as per computer entry   In brief ,this is a  63 y/o M w/ a PMHx of mild intellectual disability, mood disorder, psychotic disorder, PTSD, anxiety, and glaucoma who presented to the ED from a group home with right hip pain.  Pt poor historian, endorses pain in th right hip and right groin area with restricted ROM  Xray of the hip and femur showed deformity of the left hip secondary to an old femoral neck fracture with   varus deformity. Severe left knee joint arthrosis is noted. Right hip and right femur with chronic appearing lesser trochanter fracture is noted. No acute fracture   or dislocation is identified. There is a moderate right knee joint effusion. Severe right knee joint arthrosis is noted.  pt was evaluated by Orthopedics and no surgical intervention was advised    Plan-   Pain control- pt on Tylenol, will start Percoet  IR PRN for moderate to severe pain  will obtain additional imaging- CT pelvis and right hip to evaluate for fracture  I called group home and spoke to supervisor- As per supervisor, pt was sent in because he was too tired and not eating much. No recent falls  supervisor to fax records to [phone] ( Hospitalist office)  CK elevated, will start IVF, monitor CK  Hyopokalemia - replete  Trend LFT - elevated likely due to elevated CK  continue to monitor closely      Addendum   I called nurse  [id number] per the nurse, pt was seen by his PMD a week for pain in the hip and reported to the MD that he fell. nurse to fax over the consult when she gets back to the [facility]

## 2017-11-11 ENCOUNTER — TRANSCRIPTION ENCOUNTER (OUTPATIENT)
Age: 62
End: 2017-11-11

## 2017-11-11 LAB
APPEARANCE UR: SIGNIFICANT CHANGE UP
BILIRUB UR-MCNC: SIGNIFICANT CHANGE UP
BLOOD UR QL VISUAL: HIGH
BUN SERPL-MCNC: 24 MG/DL — HIGH (ref 7–23)
CALCIUM SERPL-MCNC: 8 MG/DL — LOW (ref 8.4–10.5)
CHLORIDE SERPL-SCNC: 112 MMOL/L — HIGH (ref 98–107)
CO2 SERPL-SCNC: 21 MMOL/L — LOW (ref 22–31)
COLOR SPEC: YELLOW — SIGNIFICANT CHANGE UP
CREAT SERPL-MCNC: 0.68 MG/DL — SIGNIFICANT CHANGE UP (ref 0.5–1.3)
GLUCOSE SERPL-MCNC: 81 MG/DL — SIGNIFICANT CHANGE UP (ref 70–99)
GLUCOSE UR-MCNC: NEGATIVE — SIGNIFICANT CHANGE UP
HCT VFR BLD CALC: 42.3 % — SIGNIFICANT CHANGE UP (ref 39–50)
HGB BLD-MCNC: 14.4 G/DL — SIGNIFICANT CHANGE UP (ref 13–17)
KETONES UR-MCNC: SIGNIFICANT CHANGE UP
LEUKOCYTE ESTERASE UR-ACNC: NEGATIVE — SIGNIFICANT CHANGE UP
MCHC RBC-ENTMCNC: 33.6 PG — SIGNIFICANT CHANGE UP (ref 27–34)
MCHC RBC-ENTMCNC: 34 % — SIGNIFICANT CHANGE UP (ref 32–36)
MCV RBC AUTO: 98.8 FL — SIGNIFICANT CHANGE UP (ref 80–100)
MUCOUS THREADS # UR AUTO: SIGNIFICANT CHANGE UP
NITRITE UR-MCNC: NEGATIVE — SIGNIFICANT CHANGE UP
NON-SQ EPI CELLS # UR AUTO: 2 — SIGNIFICANT CHANGE UP
NRBC # FLD: 0 — SIGNIFICANT CHANGE UP
PH UR: 6.5 — SIGNIFICANT CHANGE UP (ref 4.6–8)
PLATELET # BLD AUTO: 108 K/UL — LOW (ref 150–400)
PMV BLD: 11.8 FL — SIGNIFICANT CHANGE UP (ref 7–13)
POTASSIUM SERPL-MCNC: 3.7 MMOL/L — SIGNIFICANT CHANGE UP (ref 3.5–5.3)
POTASSIUM SERPL-SCNC: 3.7 MMOL/L — SIGNIFICANT CHANGE UP (ref 3.5–5.3)
PROT UR-MCNC: 150 — HIGH
RBC # BLD: 4.28 M/UL — SIGNIFICANT CHANGE UP (ref 4.2–5.8)
RBC # FLD: 14 % — SIGNIFICANT CHANGE UP (ref 10.3–14.5)
RBC CASTS # UR COMP ASSIST: SIGNIFICANT CHANGE UP (ref 0–?)
SODIUM SERPL-SCNC: 144 MMOL/L — SIGNIFICANT CHANGE UP (ref 135–145)
SP GR SPEC: > 1.04 — HIGH (ref 1–1.03)
SPECIMEN SOURCE: SIGNIFICANT CHANGE UP
SQUAMOUS # UR AUTO: SIGNIFICANT CHANGE UP
UROBILINOGEN FLD QL: >8 E.U. — SIGNIFICANT CHANGE UP (ref 0.1–0.2)
WBC # BLD: 8.47 K/UL — SIGNIFICANT CHANGE UP (ref 3.8–10.5)
WBC # FLD AUTO: 8.47 K/UL — SIGNIFICANT CHANGE UP (ref 3.8–10.5)
WBC UR QL: SIGNIFICANT CHANGE UP (ref 0–?)

## 2017-11-11 PROCEDURE — 99233 SBSQ HOSP IP/OBS HIGH 50: CPT

## 2017-11-11 RX ORDER — LIDOCAINE HCL 20 MG/ML
10 VIAL (ML) INJECTION ONCE
Qty: 0 | Refills: 0 | Status: COMPLETED | OUTPATIENT
Start: 2017-11-11 | End: 2017-11-11

## 2017-11-11 RX ORDER — TAMSULOSIN HYDROCHLORIDE 0.4 MG/1
0.4 CAPSULE ORAL AT BEDTIME
Qty: 0 | Refills: 0 | Status: DISCONTINUED | OUTPATIENT
Start: 2017-11-11 | End: 2017-11-24

## 2017-11-11 RX ORDER — POLYETHYLENE GLYCOL 3350 17 G/17G
17 POWDER, FOR SOLUTION ORAL DAILY
Qty: 0 | Refills: 0 | Status: DISCONTINUED | OUTPATIENT
Start: 2017-11-11 | End: 2017-11-24

## 2017-11-11 RX ADMIN — OXYCODONE AND ACETAMINOPHEN 1 TABLET(S): 5; 325 TABLET ORAL at 19:08

## 2017-11-11 RX ADMIN — OXYCODONE AND ACETAMINOPHEN 1 TABLET(S): 5; 325 TABLET ORAL at 07:05

## 2017-11-11 RX ADMIN — ARIPIPRAZOLE 20 MILLIGRAM(S): 15 TABLET ORAL at 13:01

## 2017-11-11 RX ADMIN — Medication 100 MILLIGRAM(S): at 06:35

## 2017-11-11 RX ADMIN — Medication 1 MILLIGRAM(S): at 17:44

## 2017-11-11 RX ADMIN — DIVALPROEX SODIUM 1000 MILLIGRAM(S): 500 TABLET, DELAYED RELEASE ORAL at 17:44

## 2017-11-11 RX ADMIN — HEPARIN SODIUM 5000 UNIT(S): 5000 INJECTION INTRAVENOUS; SUBCUTANEOUS at 13:01

## 2017-11-11 RX ADMIN — ACETAZOLAMIDE 500 MILLIGRAM(S): 250 TABLET ORAL at 17:44

## 2017-11-11 RX ADMIN — CITALOPRAM 40 MILLIGRAM(S): 10 TABLET, FILM COATED ORAL at 13:01

## 2017-11-11 RX ADMIN — ACETAZOLAMIDE 500 MILLIGRAM(S): 250 TABLET ORAL at 06:34

## 2017-11-11 RX ADMIN — Medication 1 DROP(S): at 17:44

## 2017-11-11 RX ADMIN — OXYCODONE AND ACETAMINOPHEN 1 TABLET(S): 5; 325 TABLET ORAL at 06:37

## 2017-11-11 RX ADMIN — BRIMONIDINE TARTRATE 1 DROP(S): 2 SOLUTION/ DROPS OPHTHALMIC at 13:02

## 2017-11-11 RX ADMIN — OXYCODONE AND ACETAMINOPHEN 1 TABLET(S): 5; 325 TABLET ORAL at 12:53

## 2017-11-11 RX ADMIN — OXYCODONE AND ACETAMINOPHEN 1 TABLET(S): 5; 325 TABLET ORAL at 13:50

## 2017-11-11 RX ADMIN — Medication 100 MILLIGRAM(S): at 13:01

## 2017-11-11 RX ADMIN — HEPARIN SODIUM 5000 UNIT(S): 5000 INJECTION INTRAVENOUS; SUBCUTANEOUS at 06:35

## 2017-11-11 RX ADMIN — BRIMONIDINE TARTRATE 1 DROP(S): 2 SOLUTION/ DROPS OPHTHALMIC at 06:34

## 2017-11-11 RX ADMIN — Medication 1 DROP(S): at 06:36

## 2017-11-11 RX ADMIN — SODIUM CHLORIDE 100 MILLILITER(S): 9 INJECTION INTRAMUSCULAR; INTRAVENOUS; SUBCUTANEOUS at 00:44

## 2017-11-11 RX ADMIN — DIVALPROEX SODIUM 1000 MILLIGRAM(S): 500 TABLET, DELAYED RELEASE ORAL at 06:34

## 2017-11-11 RX ADMIN — POLYETHYLENE GLYCOL 3350 17 GRAM(S): 17 POWDER, FOR SOLUTION ORAL at 19:08

## 2017-11-11 RX ADMIN — Medication 1 MILLIGRAM(S): at 06:34

## 2017-11-11 RX ADMIN — OXYCODONE AND ACETAMINOPHEN 1 TABLET(S): 5; 325 TABLET ORAL at 20:00

## 2017-11-11 RX ADMIN — Medication 10 MILLILITER(S): at 18:25

## 2017-11-11 RX ADMIN — HEPARIN SODIUM 5000 UNIT(S): 5000 INJECTION INTRAVENOUS; SUBCUTANEOUS at 21:35

## 2017-11-11 RX ADMIN — Medication 1000 UNIT(S): at 13:01

## 2017-11-11 RX ADMIN — BRIMONIDINE TARTRATE 1 DROP(S): 2 SOLUTION/ DROPS OPHTHALMIC at 21:34

## 2017-11-11 NOTE — DISCHARGE NOTE ADULT - CARE PROVIDER_API CALL
Juan Ramírez), Orthopaedic Surgery  [address] [address]  Phone: [phone]  Fax: [phone]

## 2017-11-11 NOTE — PROVIDER CONTACT NOTE (OTHER) - ACTION/TREATMENT ORDERED:
ADS NP Alysia Siu attempted to place catheter, unsuccessful.  contacted urology; MD Renan Salcido placed yo.  draining well.

## 2017-11-11 NOTE — DISCHARGE NOTE ADULT - MEDICATION SUMMARY - MEDICATIONS TO TAKE
I will START or STAY ON the medications listed below when I get home from the hospital:    oxyCODONE-acetaminophen 5 mg-325 mg oral tablet  -- 1 tab(s) by mouth every 6 hours, As needed, moderate to severe pain  -- Indication: For Pain of right lower extremity    tamsulosin 0.4 mg oral capsule  -- 1 cap(s) by mouth once a day (at bedtime)  -- Indication: For Urinary retention    divalproex sodium 500 mg oral delayed release tablet  -- 2 tab(s) by mouth 2 times a day  -- Indication: For Mood disorder    citalopram 40 mg oral tablet  -- 1 tab(s) by mouth once a day  -- Indication: For Mood disorder    benztropine 1 mg oral tablet  -- 1 tab(s) by mouth 2 times a day  -- Indication: For Mood disorder    ARIPiprazole 20 mg oral tablet  -- 1 tab(s) by mouth once a day  -- Indication: For Mood disorder    acetaZOLAMIDE 500 mg oral capsule, extended release  -- 1 cap(s) by mouth 2 times a day  -- Indication: For Htn    senna oral tablet  -- 2 tab(s) by mouth once a day (at bedtime)  -- Indication: For bowel regimen     bisacodyl 5 mg oral delayed release tablet  -- 2 tab(s) by mouth once a day (at bedtime), As needed, Constipation  -- Indication: For bowel regimen     docusate sodium 100 mg oral capsule  -- 1 cap(s) by mouth 3 times a day  -- Indication: For bowel regimen     polyethylene glycol 3350 oral powder for reconstitution  -- 17 gram(s) by mouth once a day  -- Indication: For bowel regimen     melatonin 3 mg oral tablet  -- 1 tab(s) by mouth once a day (at bedtime), As Needed - for bladder spasms  -- Indication: For insomnia     Xalatan 0.005% ophthalmic solution  -- 1 drop(s) to each affected eye once a day (in the evening)  -- Indication: For Glaucoma    brimonidine 0.1% ophthalmic solution  -- 1 drop(s) to each affected eye every 8 hours  -- Indication: For Glaucoma    timolol maleate 0.5% ophthalmic solution  -- 1 drop(s) to each affected eye 2 times a day  -- Indication: For Glaucoma    Multiple Vitamins oral tablet  -- 1 tab(s) by mouth once a day  -- Indication: For Supplement    cholecalciferol oral tablet  -- 1000 unit(s) by mouth once a day  -- Indication: For Supplement I will START or STAY ON the medications listed below when I get home from the hospital:    oxyCODONE-acetaminophen 5 mg-325 mg oral tablet  -- 1 tab(s) by mouth every 6 hours, As needed, moderate to severe pain  -- Indication: For Pain of right lower extremity    tamsulosin 0.4 mg oral capsule  -- 1 cap(s) by mouth once a day (at bedtime)  -- Indication: For Urinary retention    divalproex sodium 500 mg oral delayed release tablet  -- 2 tab(s) by mouth 2 times a day  -- Indication: For Mood disorder    citalopram 40 mg oral tablet  -- 1 tab(s) by mouth once a day  -- Indication: For Mood disorder    benztropine 1 mg oral tablet  -- 1 tab(s) by mouth 2 times a day  -- Indication: For Mood disorder    ARIPiprazole 20 mg oral tablet  -- 1 tab(s) by mouth once a day  -- Indication: For Mood disorder    acetaZOLAMIDE 500 mg oral capsule, extended release  -- 1 cap(s) by mouth 2 times a day  -- Indication: For Glaucoma    senna oral tablet  -- 2 tab(s) by mouth once a day (at bedtime)  -- Indication: For bowel regimen     bisacodyl 5 mg oral delayed release tablet  -- 2 tab(s) by mouth once a day (at bedtime), As needed, Constipation  -- Indication: For bowel regimen     docusate sodium 100 mg oral capsule  -- 1 cap(s) by mouth 3 times a day  -- Indication: For bowel regimen     polyethylene glycol 3350 oral powder for reconstitution  -- 17 gram(s) by mouth once a day  -- Indication: For bowel regimen     melatonin 3 mg oral tablet  -- 1 tab(s) by mouth once a day (at bedtime), As Needed - for bladder spasms  -- Indication: For insomnia     Xalatan 0.005% ophthalmic solution  -- 1 drop(s) to each affected eye once a day (in the evening)  -- Indication: For Glaucoma    brimonidine 0.1% ophthalmic solution  -- 1 drop(s) to each affected eye every 8 hours  -- Indication: For Glaucoma    timolol maleate 0.5% ophthalmic solution  -- 1 drop(s) to each affected eye 2 times a day  -- Indication: For Glaucoma    Multiple Vitamins oral tablet  -- 1 tab(s) by mouth once a day  -- Indication: For Supplement    cholecalciferol oral tablet  -- 1000 unit(s) by mouth once a day  -- Indication: For Supplement

## 2017-11-11 NOTE — DISCHARGE NOTE ADULT - CARE PLAN
Principal Discharge DX:	Leg pain, right  Goal:	pain management  Instructions for follow-up, activity and diet:	x- ray confirmed an old left femoral neck fracture with varus deformity, severe left knee joint.   Continue pain medication as needed  Secondary Diagnosis:	Constipation  Goal:	resolved  Secondary Diagnosis:	Glaucoma  Instructions for follow-up, activity and diet:	Continue timolol and diamox.  Secondary Diagnosis:	Mood disorder  Instructions for follow-up, activity and diet:	Continue abilify, cogentin, celexa, depakote,  Secondary Diagnosis:	Urinary retention  Instructions for follow-up, activity and diet:	Continue flomax. Principal Discharge DX:	Leg pain, right  Goal:	pain management  Instructions for follow-up, activity and diet:	x- ray confirmed an old left femoral neck fracture with varus deformity, severe left knee joint.   Continue pain medication as needed and Physical therapy.    No acute surgical intervention  Chronic pain  Please follow up with the medical doctors upon arrival to rehab  Secondary Diagnosis:	Constipation  Goal:	resolved  Instructions for follow-up, activity and diet:	continue bowel regimen  Secondary Diagnosis:	Glaucoma  Goal:	remain stable  Instructions for follow-up, activity and diet:	Continue timolol and diamox.  Secondary Diagnosis:	Mood disorder  Goal:	stable  Instructions for follow-up, activity and diet:	Continue abilify, cogentin, celexa, depakote,  Secondary Diagnosis:	Urinary retention  Goal:	resolved  Instructions for follow-up, activity and diet:	s/p yo placement.  passed TOV.  Continue flomax. Principal Discharge DX:	Hip pain, bilateral  Goal:	pain management  Instructions for follow-up, activity and diet:	x-rays confirmed an old left femoral neck fracture with varus deformity, severe left knee joint arthrosis and a chronic appearing right lesser trochanteric fracture, severe right knee joint arthrosis.  Continue pain medication as needed and Physical therapy.    No acute surgical intervention  Chronic pain  Please follow up with the medical doctors upon arrival to rehab  Secondary Diagnosis:	Constipation  Goal:	resolved  Instructions for follow-up, activity and diet:	continue bowel regimen  Secondary Diagnosis:	Glaucoma  Goal:	remain stable  Instructions for follow-up, activity and diet:	Continue timolol and diamox.  Secondary Diagnosis:	Mood disorder  Goal:	stable  Instructions for follow-up, activity and diet:	Continue abilify, cogentin, celexa, depakote,  Secondary Diagnosis:	Urinary retention  Goal:	resolved  Instructions for follow-up, activity and diet:	s/p yo placement.  passed TOV.  Continue flomax.

## 2017-11-11 NOTE — PROVIDER CONTACT NOTE (OTHER) - SITUATION
patient ordered for yo catheter; 2 RN at bedside attempted to place yo without success; meeting resistance/obstruction.

## 2017-11-11 NOTE — PROVIDER CONTACT NOTE (OTHER) - BACKGROUND
patient admitted with RLE pain from group home; continues to have pain, medicated with percocet tablets with relief.  continues to have urinary retention; straight cath'ed x2 in past 24 hours.

## 2017-11-11 NOTE — DISCHARGE NOTE ADULT - PATIENT PORTAL LINK FT
“You can access the Follow[facility] Patient Portal, offered by [facility], by registering with the following website: [email or web address]”

## 2017-11-11 NOTE — PROVIDER CONTACT NOTE (OTHER) - SITUATION
Pt unable to void for 12 hours, 323 mL present upon bladder scan, upon catheterization RN unable to pass catheter. no

## 2017-11-11 NOTE — DISCHARGE NOTE ADULT - PLAN OF CARE
pain management x- ray confirmed an old left femoral neck fracture with varus deformity, severe left knee joint.   Continue pain medication as needed resolved Continue timolol and diamox. Continue abilify, cogentin, celexa, depakote, Continue flomax. x- ray confirmed an old left femoral neck fracture with varus deformity, severe left knee joint.   Continue pain medication as needed and Physical therapy.    No acute surgical intervention  Chronic pain  Please follow up with the medical doctors upon arrival to rehab continue bowel regimen remain stable stable s/p yo placement.  passed TOV.  Continue flomax. x-rays confirmed an old left femoral neck fracture with varus deformity, severe left knee joint arthrosis and a chronic appearing right lesser trochanteric fracture, severe right knee joint arthrosis.  Continue pain medication as needed and Physical therapy.    No acute surgical intervention  Chronic pain  Please follow up with the medical doctors upon arrival to rehab

## 2017-11-11 NOTE — DISCHARGE NOTE ADULT - COMMUNITY RESOURCES
[facility] for rehab address: [address] [place] [address], tel # [phone], [facility] ambulance tel # [phone]

## 2017-11-11 NOTE — CHART NOTE - NSCHARTNOTEFT_GEN_A_CORE
Notified by RN that difficult to straight cath as meeting resistance . Rn had notified earlier that patient has not voided and bladder scan showed 323 ml and patient unable to void. Straight cath ordered for urinary retention . Seen patient  at bedside. Straight cath done under sterile technique positive urine return with small amount of blood in the urine likely 2/2 trauma. will send urinalysis . consider urology consult for Urinary retention .

## 2017-11-11 NOTE — PROGRESS NOTE ADULT - SUBJECTIVE AND OBJECTIVE BOX
Patient is a 62y old  Male who presents with a chief complaint of Right hip pain (10 Nov 2017 05:33)      SUBJECTIVE / OVERNIGHT EVENTS: Per nurse patient has been retaining urine and now s/p straight cath x2 w/ residuals of ~475 and ~375 respectively     Review of Systems: Unable to assess as patient not answering my questions    MEDICATIONS  (STANDING):  acetazolamide   ER Capsule 500 milliGRAM(s) Oral two times a day  ARIPiprazole 20 milliGRAM(s) Oral daily  benztropine 1 milliGRAM(s) Oral two times a day  brimonidine 0.2% Ophthalmic Solution 1 Drop(s) Both EYES three times a day  cholecalciferol 1000 Unit(s) Oral daily  citalopram 40 milliGRAM(s) Oral daily  diVALproex DR 1000 milliGRAM(s) Oral two times a day  docusate sodium 100 milliGRAM(s) Oral three times a day  heparin  Injectable 5000 Unit(s) SubCutaneous every 8 hours  senna 2 Tablet(s) Oral at bedtime  sodium chloride 0.9%. 1000 milliLiter(s) (100 mL/Hr) IV Continuous <Continuous>  timolol 0.5% Solution 1 Drop(s) Both EYES two times a day    MEDICATIONS  (PRN):  acetaminophen   Tablet. 650 milliGRAM(s) Oral every 6 hours PRN Moderate and severe pain  bisacodyl 10 milliGRAM(s) Oral at bedtime PRN Constipation  oxyCODONE    5 mG/acetaminophen 325 mG 1 Tablet(s) Oral every 6 hours PRN moderate to severe pain      PHYSICAL EXAM:    I&O's Summary    10 Nov 2017 07:01  -  11 Nov 2017 07:00  --------------------------------------------------------  IN: 780 mL / OUT: 550 mL / NET: 230 mL    11 Nov 2017 07:01  -  11 Nov 2017 14:51  --------------------------------------------------------  IN: 600 mL / OUT: 0 mL / NET: 600 mL    Vital Signs Last 24 Hrs  T(C): 36.6 (11 Nov 2017 13:43), Max: 36.7 (10 Nov 2017 20:21)  T(F): 97.8 (11 Nov 2017 13:43), Max: 98 (10 Nov 2017 20:21)  HR: 80 (11 Nov 2017 13:43) (75 - 86)  BP: 101/66 (11 Nov 2017 13:43) (101/66 - 113/53)  BP(mean): --  RR: 17 (11 Nov 2017 13:43) (17 - 17)  SpO2: 100% (11 Nov 2017 13:43) (98% - 100%)  LABS:  CAPILLARY BLOOD GLUCOSE  PHYSICAL EXAM:   Constitutional: NAD  HEENT: NC/AT  Neck: Supple  Respiratory: Grossly clear anteriorly, No wheeze appreciated  Cardiovascular: RRR; +S1/S2  Gastrointestinal: +BS, Soft, NT, ND, No rigidity  Genitourinary: No Larry, No suprapubic TTP, mild bleeding noted at penile orifice   Extremities: Mild RLE nonpitting edema  Neurological: Occasionally follows commands, Movement seen in all extremities, Mild resting tremor of UEs  Skin: Warm and dry, No ecchymosis or erythema appreciated near R hip or RLE  Musculoskeletal: Difficult to assess as patient was unable to follow commands; Tenderness on palpation of right thing   Psychiatric: Unable to assess                            14.4   8.47  )-----------( 108      ( 11 Nov 2017 06:05 )             42.3     11-11    144  |  112<H>  |  24<H>  ----------------------------<  81  3.7   |  21<L>  |  0.68    Ca    8.0<L>      11 Nov 2017 06:05  Phos  3.7     11-10  Mg     2.0     11-10    TPro  5.1<L>  /  Alb  2.6<L>  /  TBili  0.7  /  DBili  x   /  AST  48<H>  /  ALT  18  /  AlkPhos  64  11-10      CARDIAC MARKERS ( 10 Nov 2017 05:30 )  x     / x     / 500 u/L / x     / x          Urinalysis Basic - ( 11 Nov 2017 06:45 )    Color: YELLOW / Appearance: HAZY / SG: > 1.040 / pH: 6.5  Gluc: NEGATIVE / Ketone: TRACE  / Bili: SMALL / Urobili: >8 E.U.   Blood: TRACE / Protein: 150 / Nitrite: NEGATIVE   Leuk Esterase: NEGATIVE / RBC: 2-5 / WBC 2-5   Sq Epi: FEW / Non Sq Epi: x / Bacteria: x        RADIOLOGY & ADDITIONAL TESTS:    Imaging Personally Reviewed:    Consultant(s) Notes Reviewed:      Care Discussed with Consultants/Other Providers:

## 2017-11-11 NOTE — PROVIDER CONTACT NOTE (OTHER) - ASSESSMENT
patient continues to have blood at tip of penis (ADS NP Barahona made aware earlier in shift); moistened gauze changed frequently.  last straight cath preformed by overnight NP.  patient medicated for pain; continues to have no urine output.

## 2017-11-11 NOTE — CHART NOTE - NSCHARTNOTEFT_GEN_A_CORE
Ortho consult called.  Spoke with Dr. Mccollum regarding CT hips which was noted with L femoral head AVN.  As per Dr. Mccollum, no further interventions at this time.  Patient may follow up as outpatient with Dr. Leyva if any discomfort.

## 2017-11-11 NOTE — PROVIDER CONTACT NOTE (OTHER) - ASSESSMENT
pt offered urinal and toileting, patient attempts to push urine out and complains of pain and discomfort and is unsuccessful at passing urine. When trying to straight cath, unable to fully advance catheter.

## 2017-11-11 NOTE — PROCEDURE NOTE - NSPROCDETAILS_GEN_ALL_CORE
prior to placement, an active physician order for the placement of a urinary catheter was verified/sterile technique, non-indwelling urinary device inserted/sterile technique, indwelling urinary device inserted/sterile technique, old cysto removed, new tube inserted/a urinary catheter insertion kit was used for all insertion materials

## 2017-11-11 NOTE — PROVIDER CONTACT NOTE (OTHER) - SITUATION
patient with urinary retention; bladder scanned and straight cath'ed x2 in past 24 hours.  bladder scan preformed 8 hours after last straight cath; still no void.  bladder scan 119ml this afternoon.

## 2017-11-11 NOTE — PROCEDURE NOTE - NSURITECHNIQUE_GU_A_CORE
The catheter was appropriately lubricated/The collection bag is below the level of the patient and urinary bladder/The site was cleaned with soap/water and sterile solution (betadine)/Proper hand hygiene was performed/Sterile gloves were worn for the duration of the procedure/A sterile drape was used to cover all adjacent areas/The urinary drainage system is closed at the end of the procedure/The catheter was secured with a securement device (e.g. StatLock)/All applicable medical record documentation is completed

## 2017-11-11 NOTE — PROVIDER CONTACT NOTE (OTHER) - ACTION/TREATMENT ORDERED:
Smooth Mclean, and Collin came to evaluate patient. They were able to successfully straight cath the patient. 225 mL drained. UA ordered for r/t pain and discomfort.

## 2017-11-11 NOTE — CHART NOTE - NSCHARTNOTEFT_GEN_A_CORE
attempted to place as per protocol.  Unable to place yo 2/2 resistance 2/2 BPH.  Urology consulted.  Flomax added.  Aggressive bowel regimen . . attempted to place as per protocol.  Unable to place yo 2/2 resistance 2/2 BPH.  Urology consulted.  Flomax added.  Aggressive bowel regimen . .Urology to place yo

## 2017-11-11 NOTE — PROVIDER CONTACT NOTE (OTHER) - ASSESSMENT
patient continues to have blood at tip of penis; moistened gauze changed frequently.  last straight cath preformed by overnight NP.  patient medicated for pain; continues to have no urine output, dry incontinence pad. bladder scan 271 ml at 1655.

## 2017-11-11 NOTE — PROVIDER CONTACT NOTE (OTHER) - BACKGROUND
patient admitted with RLE pain from group home; continues to have pain, medicated with percocet tablets with relief.  continues to have urinary retention; straight cath;e'd x2 in past 24 hours.

## 2017-11-11 NOTE — PROVIDER CONTACT NOTE (OTHER) - SITUATION
patient with urinary retention; bladder scanned and straight cath'ed x2 in past 24 hours.  bladder scan preformed 8 hours after last straight cath; still no void.  bladder scan 119ml.

## 2017-11-11 NOTE — DISCHARGE NOTE ADULT - HOSPITAL COURSE
63 y/o M w/ a PMHx of mild intellectual disability, mood disorder, psychotic disorder, PTSD, anxiety, and glaucoma who presented to the ED from a group home with right hip pain  Elevated CRP (76.8), but ESR was WNL (2). X-ray showed - chronic appearing lesser trochanter fracture  - On 11/10 CT pelvis- No Fx. There is sclerotic and cystic change at the left femoral head avascular necrosis.  - Ortho consulted and recommended -no acute surgical intervention. c/w pain management (has PO percocet prn), pain is chronic  - PT- Rehab  - 11/10: blood and urine cx: ntd  - 11/11 blood cx ntd; UA neg   - Swallow eval completed - Mechanical soft with thin liquids     Urinary retention  - Larry catheter placed by urology on 11/11   - Flomax added  - Patient received aggressive bowel regimen   - 11/15 Larry d/c, Passed TOV. Patient is voiding freely.    Patient is medically stable for discharge. 63 y/o M w/ a PMHx of mild intellectual disability, mood disorder, psychotic disorder, PTSD, anxiety, and glaucoma who presented to the ED from a group home with right hip pain, clarified to be bilateral hip pain. .  Elevated CRP (76.8), but ESR was WNL (2). X-ray showed - old left femoral neck fracture with varus deformity, severe left knee joint arthrosis and a chronic appearing right lesser trochanteric fracture, severe right knee joint arthrosis  - On 11/10 CT pelvis- No acute Fx. There is sclerotic and cystic change at the left femoral head avascular necrosis.  - Ortho consulted and recommended -no acute surgical intervention. c/w pain management (has PO percocet prn), pain is chronic  - PT- recommended Rehab  - 11/10: blood and urine cx: ntd  - 11/11 blood cx ntd; UA neg   - Swallow eval completed - Mechanical soft with thin liquids     Urinary retention  - Larry catheter placed by urology on 11/11   - Flomax added  - Patient received aggressive bowel regimen   - 11/15 Larry d/c, Passed TOV. Patient is voiding freely.    Patient is medically stable for discharge. Plan for rehab, with goal to return to group home afterwards.

## 2017-11-12 LAB
BACTERIA UR CULT: SIGNIFICANT CHANGE UP
BUN SERPL-MCNC: 25 MG/DL — HIGH (ref 7–23)
CALCIUM SERPL-MCNC: 8 MG/DL — LOW (ref 8.4–10.5)
CHLORIDE SERPL-SCNC: 109 MMOL/L — HIGH (ref 98–107)
CO2 SERPL-SCNC: 20 MMOL/L — LOW (ref 22–31)
CREAT SERPL-MCNC: 0.62 MG/DL — SIGNIFICANT CHANGE UP (ref 0.5–1.3)
GLUCOSE SERPL-MCNC: 70 MG/DL — SIGNIFICANT CHANGE UP (ref 70–99)
HCT VFR BLD CALC: 41.2 % — SIGNIFICANT CHANGE UP (ref 39–50)
HGB BLD-MCNC: 13.7 G/DL — SIGNIFICANT CHANGE UP (ref 13–17)
MCHC RBC-ENTMCNC: 33.3 % — SIGNIFICANT CHANGE UP (ref 32–36)
MCHC RBC-ENTMCNC: 33.4 PG — SIGNIFICANT CHANGE UP (ref 27–34)
MCV RBC AUTO: 100.5 FL — HIGH (ref 80–100)
NRBC # FLD: 0 — SIGNIFICANT CHANGE UP
PLATELET # BLD AUTO: 123 K/UL — LOW (ref 150–400)
PMV BLD: 11.4 FL — SIGNIFICANT CHANGE UP (ref 7–13)
POTASSIUM SERPL-MCNC: 3.5 MMOL/L — SIGNIFICANT CHANGE UP (ref 3.5–5.3)
POTASSIUM SERPL-SCNC: 3.5 MMOL/L — SIGNIFICANT CHANGE UP (ref 3.5–5.3)
RBC # BLD: 4.1 M/UL — LOW (ref 4.2–5.8)
RBC # FLD: 14.3 % — SIGNIFICANT CHANGE UP (ref 10.3–14.5)
SODIUM SERPL-SCNC: 143 MMOL/L — SIGNIFICANT CHANGE UP (ref 135–145)
SPECIMEN SOURCE: SIGNIFICANT CHANGE UP
WBC # BLD: 9.04 K/UL — SIGNIFICANT CHANGE UP (ref 3.8–10.5)
WBC # FLD AUTO: 9.04 K/UL — SIGNIFICANT CHANGE UP (ref 3.8–10.5)

## 2017-11-12 PROCEDURE — 99233 SBSQ HOSP IP/OBS HIGH 50: CPT

## 2017-11-12 RX ORDER — SODIUM CHLORIDE 9 MG/ML
250 INJECTION INTRAMUSCULAR; INTRAVENOUS; SUBCUTANEOUS ONCE
Qty: 0 | Refills: 0 | Status: COMPLETED | OUTPATIENT
Start: 2017-11-12 | End: 2017-11-12

## 2017-11-12 RX ADMIN — DIVALPROEX SODIUM 1000 MILLIGRAM(S): 500 TABLET, DELAYED RELEASE ORAL at 05:43

## 2017-11-12 RX ADMIN — Medication 10 MILLIGRAM(S): at 22:18

## 2017-11-12 RX ADMIN — Medication 1 MILLIGRAM(S): at 17:32

## 2017-11-12 RX ADMIN — Medication 100 MILLIGRAM(S): at 05:44

## 2017-11-12 RX ADMIN — OXYCODONE AND ACETAMINOPHEN 1 TABLET(S): 5; 325 TABLET ORAL at 14:05

## 2017-11-12 RX ADMIN — HEPARIN SODIUM 5000 UNIT(S): 5000 INJECTION INTRAVENOUS; SUBCUTANEOUS at 13:06

## 2017-11-12 RX ADMIN — ARIPIPRAZOLE 20 MILLIGRAM(S): 15 TABLET ORAL at 13:06

## 2017-11-12 RX ADMIN — SODIUM CHLORIDE 500 MILLILITER(S): 9 INJECTION INTRAMUSCULAR; INTRAVENOUS; SUBCUTANEOUS at 23:34

## 2017-11-12 RX ADMIN — TAMSULOSIN HYDROCHLORIDE 0.4 MILLIGRAM(S): 0.4 CAPSULE ORAL at 22:18

## 2017-11-12 RX ADMIN — HEPARIN SODIUM 5000 UNIT(S): 5000 INJECTION INTRAVENOUS; SUBCUTANEOUS at 22:18

## 2017-11-12 RX ADMIN — BRIMONIDINE TARTRATE 1 DROP(S): 2 SOLUTION/ DROPS OPHTHALMIC at 13:06

## 2017-11-12 RX ADMIN — BRIMONIDINE TARTRATE 1 DROP(S): 2 SOLUTION/ DROPS OPHTHALMIC at 22:18

## 2017-11-12 RX ADMIN — OXYCODONE AND ACETAMINOPHEN 1 TABLET(S): 5; 325 TABLET ORAL at 13:06

## 2017-11-12 RX ADMIN — Medication 1 MILLIGRAM(S): at 05:44

## 2017-11-12 RX ADMIN — ACETAZOLAMIDE 500 MILLIGRAM(S): 250 TABLET ORAL at 05:43

## 2017-11-12 RX ADMIN — Medication 1000 UNIT(S): at 13:06

## 2017-11-12 RX ADMIN — CITALOPRAM 40 MILLIGRAM(S): 10 TABLET, FILM COATED ORAL at 13:06

## 2017-11-12 RX ADMIN — BRIMONIDINE TARTRATE 1 DROP(S): 2 SOLUTION/ DROPS OPHTHALMIC at 05:49

## 2017-11-12 RX ADMIN — HEPARIN SODIUM 5000 UNIT(S): 5000 INJECTION INTRAVENOUS; SUBCUTANEOUS at 05:49

## 2017-11-12 RX ADMIN — ACETAZOLAMIDE 500 MILLIGRAM(S): 250 TABLET ORAL at 17:31

## 2017-11-12 RX ADMIN — POLYETHYLENE GLYCOL 3350 17 GRAM(S): 17 POWDER, FOR SOLUTION ORAL at 13:06

## 2017-11-12 RX ADMIN — Medication 100 MILLIGRAM(S): at 13:06

## 2017-11-12 RX ADMIN — Medication 1 DROP(S): at 05:49

## 2017-11-12 RX ADMIN — Medication 1 DROP(S): at 17:32

## 2017-11-12 RX ADMIN — SENNA PLUS 2 TABLET(S): 8.6 TABLET ORAL at 22:18

## 2017-11-12 RX ADMIN — Medication 100 MILLIGRAM(S): at 22:18

## 2017-11-12 RX ADMIN — DIVALPROEX SODIUM 1000 MILLIGRAM(S): 500 TABLET, DELAYED RELEASE ORAL at 17:31

## 2017-11-12 NOTE — PROGRESS NOTE ADULT - SUBJECTIVE AND OBJECTIVE BOX
Patient is a 62y old  Male who presents with a chief complaint of Right hip pain (11 Nov 2017 15:31)      SUBJECTIVE / OVERNIGHT EVENTS: Patient again retaining urine over night. Given difficulty with placement urology was called who placed yo.     Review of Systems: Unable to assess 2/2 pts baseline mental status     MEDICATIONS  (STANDING):  acetazolamide   ER Capsule 500 milliGRAM(s) Oral two times a day  ARIPiprazole 20 milliGRAM(s) Oral daily  benztropine 1 milliGRAM(s) Oral two times a day  brimonidine 0.2% Ophthalmic Solution 1 Drop(s) Both EYES three times a day  cholecalciferol 1000 Unit(s) Oral daily  citalopram 40 milliGRAM(s) Oral daily  diVALproex DR 1000 milliGRAM(s) Oral two times a day  docusate sodium 100 milliGRAM(s) Oral three times a day  heparin  Injectable 5000 Unit(s) SubCutaneous every 8 hours  polyethylene glycol 3350 17 Gram(s) Oral daily  senna 2 Tablet(s) Oral at bedtime  sodium chloride 0.9%. 1000 milliLiter(s) (100 mL/Hr) IV Continuous <Continuous>  tamsulosin 0.4 milliGRAM(s) Oral at bedtime  timolol 0.5% Solution 1 Drop(s) Both EYES two times a day    MEDICATIONS  (PRN):  acetaminophen   Tablet. 650 milliGRAM(s) Oral every 6 hours PRN Moderate and severe pain  bisacodyl 10 milliGRAM(s) Oral at bedtime PRN Constipation  oxyCODONE    5 mG/acetaminophen 325 mG 1 Tablet(s) Oral every 6 hours PRN moderate to severe pain      PHYSICAL EXAM:    I&O's Summary    11 Nov 2017 07:01  -  12 Nov 2017 07:00  --------------------------------------------------------  IN: 600 mL / OUT: 300 mL / NET: 300 mL    Vital Signs Last 24 Hrs  T(C): 36.4 (12 Nov 2017 04:46), Max: 36.8 (11 Nov 2017 22:12)  T(F): 97.6 (12 Nov 2017 04:46), Max: 98.3 (11 Nov 2017 22:12)  HR: 86 (12 Nov 2017 04:46) (79 - 86)  BP: 119/59 (12 Nov 2017 04:46) (97/58 - 122/64)  BP(mean): --  RR: 18 (12 Nov 2017 04:46) (16 - 18)  SpO2: 99% (12 Nov 2017 04:46) (98% - 100%)  PHYSICAL EXAM:   Constitutional: NAD  HEENT: NC/AT  Neck: Supple  Respiratory: Grossly clear anteriorly, No wheeze appreciated  Cardiovascular: RRR; +S1/S2  Gastrointestinal: +BS, Soft, NT, ND, No rigidity  Genitourinary: No Yo, No suprapubic TTP, mild bleeding noted at penile orifice   Extremities: Mild RLE nonpitting edema  Neurological: Occasionally follows commands, Movement seen in all extremities, Mild resting tremor of UEs  Skin: Warm and dry, No ecchymosis or erythema appreciated near R hip or RLE  Musculoskeletal: Difficult to assess as patient was unable to follow commands; Tenderness on palpation of right and left thigh.   Psychiatric: Unable to assess    LABS:  CAPILLARY BLOOD GLUCOSE                              13.7   9.04  )-----------( 123      ( 12 Nov 2017 06:50 )             41.2     11-12    143  |  109<H>  |  25<H>  ----------------------------<  70  3.5   |  20<L>  |  0.62    Ca    8.0<L>      12 Nov 2017 06:50            Urinalysis Basic - ( 11 Nov 2017 06:45 )    Color: YELLOW / Appearance: HAZY / SG: > 1.040 / pH: 6.5  Gluc: NEGATIVE / Ketone: TRACE  / Bili: SMALL / Urobili: >8 E.U.   Blood: TRACE / Protein: 150 / Nitrite: NEGATIVE   Leuk Esterase: NEGATIVE / RBC: 2-5 / WBC 2-5   Sq Epi: FEW / Non Sq Epi: x / Bacteria: x        RADIOLOGY & ADDITIONAL TESTS:    Imaging Personally Reviewed:    Consultant(s) Notes Reviewed:      Care Discussed with Consultants/Other Providers:    G9.04  )-----------( 123      ( 12 Nov 2017 06:50 )             41.2

## 2017-11-13 LAB
BUN SERPL-MCNC: 27 MG/DL — HIGH (ref 7–23)
CALCIUM SERPL-MCNC: 8 MG/DL — LOW (ref 8.4–10.5)
CHLORIDE SERPL-SCNC: 110 MMOL/L — HIGH (ref 98–107)
CO2 SERPL-SCNC: 20 MMOL/L — LOW (ref 22–31)
CREAT SERPL-MCNC: 0.69 MG/DL — SIGNIFICANT CHANGE UP (ref 0.5–1.3)
GLUCOSE SERPL-MCNC: 74 MG/DL — SIGNIFICANT CHANGE UP (ref 70–99)
HCT VFR BLD CALC: 41.8 % — SIGNIFICANT CHANGE UP (ref 39–50)
HGB BLD-MCNC: 14.1 G/DL — SIGNIFICANT CHANGE UP (ref 13–17)
MCHC RBC-ENTMCNC: 33.7 % — SIGNIFICANT CHANGE UP (ref 32–36)
MCHC RBC-ENTMCNC: 33.7 PG — SIGNIFICANT CHANGE UP (ref 27–34)
MCV RBC AUTO: 99.8 FL — SIGNIFICANT CHANGE UP (ref 80–100)
NRBC # FLD: 0 — SIGNIFICANT CHANGE UP
PLATELET # BLD AUTO: 123 K/UL — LOW (ref 150–400)
PMV BLD: 11 FL — SIGNIFICANT CHANGE UP (ref 7–13)
POTASSIUM SERPL-MCNC: 3.5 MMOL/L — SIGNIFICANT CHANGE UP (ref 3.5–5.3)
POTASSIUM SERPL-SCNC: 3.5 MMOL/L — SIGNIFICANT CHANGE UP (ref 3.5–5.3)
RBC # BLD: 4.19 M/UL — LOW (ref 4.2–5.8)
RBC # FLD: 14.4 % — SIGNIFICANT CHANGE UP (ref 10.3–14.5)
SODIUM SERPL-SCNC: 145 MMOL/L — SIGNIFICANT CHANGE UP (ref 135–145)
WBC # BLD: 7.24 K/UL — SIGNIFICANT CHANGE UP (ref 3.8–10.5)
WBC # FLD AUTO: 7.24 K/UL — SIGNIFICANT CHANGE UP (ref 3.8–10.5)

## 2017-11-13 PROCEDURE — 99233 SBSQ HOSP IP/OBS HIGH 50: CPT

## 2017-11-13 RX ADMIN — POLYETHYLENE GLYCOL 3350 17 GRAM(S): 17 POWDER, FOR SOLUTION ORAL at 14:22

## 2017-11-13 RX ADMIN — DIVALPROEX SODIUM 1000 MILLIGRAM(S): 500 TABLET, DELAYED RELEASE ORAL at 05:00

## 2017-11-13 RX ADMIN — Medication 1000 UNIT(S): at 14:21

## 2017-11-13 RX ADMIN — ACETAZOLAMIDE 500 MILLIGRAM(S): 250 TABLET ORAL at 05:01

## 2017-11-13 RX ADMIN — ARIPIPRAZOLE 20 MILLIGRAM(S): 15 TABLET ORAL at 14:21

## 2017-11-13 RX ADMIN — BRIMONIDINE TARTRATE 1 DROP(S): 2 SOLUTION/ DROPS OPHTHALMIC at 05:00

## 2017-11-13 RX ADMIN — Medication 1 DROP(S): at 05:00

## 2017-11-13 RX ADMIN — TAMSULOSIN HYDROCHLORIDE 0.4 MILLIGRAM(S): 0.4 CAPSULE ORAL at 23:37

## 2017-11-13 RX ADMIN — BRIMONIDINE TARTRATE 1 DROP(S): 2 SOLUTION/ DROPS OPHTHALMIC at 23:46

## 2017-11-13 RX ADMIN — HEPARIN SODIUM 5000 UNIT(S): 5000 INJECTION INTRAVENOUS; SUBCUTANEOUS at 05:00

## 2017-11-13 RX ADMIN — Medication 1 MILLIGRAM(S): at 05:00

## 2017-11-13 RX ADMIN — HEPARIN SODIUM 5000 UNIT(S): 5000 INJECTION INTRAVENOUS; SUBCUTANEOUS at 23:46

## 2017-11-13 RX ADMIN — Medication 1 DROP(S): at 17:49

## 2017-11-13 RX ADMIN — Medication 100 MILLIGRAM(S): at 23:37

## 2017-11-13 RX ADMIN — DIVALPROEX SODIUM 1000 MILLIGRAM(S): 500 TABLET, DELAYED RELEASE ORAL at 17:52

## 2017-11-13 RX ADMIN — Medication 650 MILLIGRAM(S): at 11:36

## 2017-11-13 RX ADMIN — CITALOPRAM 40 MILLIGRAM(S): 10 TABLET, FILM COATED ORAL at 14:22

## 2017-11-13 RX ADMIN — Medication 650 MILLIGRAM(S): at 12:30

## 2017-11-13 RX ADMIN — BRIMONIDINE TARTRATE 1 DROP(S): 2 SOLUTION/ DROPS OPHTHALMIC at 14:21

## 2017-11-13 RX ADMIN — HEPARIN SODIUM 5000 UNIT(S): 5000 INJECTION INTRAVENOUS; SUBCUTANEOUS at 14:22

## 2017-11-13 RX ADMIN — Medication 100 MILLIGRAM(S): at 05:00

## 2017-11-13 RX ADMIN — ACETAZOLAMIDE 500 MILLIGRAM(S): 250 TABLET ORAL at 17:49

## 2017-11-13 RX ADMIN — Medication 1 MILLIGRAM(S): at 17:49

## 2017-11-13 RX ADMIN — SENNA PLUS 2 TABLET(S): 8.6 TABLET ORAL at 23:37

## 2017-11-13 NOTE — PROGRESS NOTE ADULT - SUBJECTIVE AND OBJECTIVE BOX
Patient is a 62y old  Male who presents with a chief complaint of Right hip pain (11 Nov 2017 15:31)      SUBJECTIVE / OVERNIGHT EVENTS: No overnight event. Pt non-verbal; no ROS obtained.    MEDICATIONS  (STANDING):  acetazolamide   ER Capsule 500 milliGRAM(s) Oral two times a day  ARIPiprazole 20 milliGRAM(s) Oral daily  benztropine 1 milliGRAM(s) Oral two times a day  brimonidine 0.2% Ophthalmic Solution 1 Drop(s) Both EYES three times a day  cholecalciferol 1000 Unit(s) Oral daily  citalopram 40 milliGRAM(s) Oral daily  diVALproex DR 1000 milliGRAM(s) Oral two times a day  docusate sodium 100 milliGRAM(s) Oral three times a day  heparin  Injectable 5000 Unit(s) SubCutaneous every 8 hours  polyethylene glycol 3350 17 Gram(s) Oral daily  senna 2 Tablet(s) Oral at bedtime  sodium chloride 0.9%. 1000 milliLiter(s) (100 mL/Hr) IV Continuous <Continuous>  tamsulosin 0.4 milliGRAM(s) Oral at bedtime  timolol 0.5% Solution 1 Drop(s) Both EYES two times a day    MEDICATIONS  (PRN):  acetaminophen   Tablet. 650 milliGRAM(s) Oral every 6 hours PRN Moderate and severe pain  bisacodyl 10 milliGRAM(s) Oral at bedtime PRN Constipation  oxyCODONE    5 mG/acetaminophen 325 mG 1 Tablet(s) Oral every 6 hours PRN moderate to severe pain        CAPILLARY BLOOD GLUCOSE        I&O's Summary    12 Nov 2017 07:01 - 13 Nov 2017 07:00  --------------------------------------------------------  IN: 0 mL / OUT: 130 mL / NET: -130 mL    13 Nov 2017 07:01  -  13 Nov 2017 09:28  --------------------------------------------------------  IN: 0 mL / OUT: 250 mL / NET: -250 mL        PHYSICAL EXAM:  GENERAL: NAD, non-verbal  HEAD:  Atraumatic, Normocephalic  EYES: EOMI, PERRLA, conjunctiva and sclera clear  CHEST/LUNG: Clear to auscultation bilaterally; No wheeze  HEART: Regular rate and rhythm; No murmurs, rubs, or gallops  ABDOMEN: Soft, Nontender, Nondistended; Bowel sounds present  EXTREMITIES:  2+ Peripheral Pulses, No clubbing, cyanosis, or edema      LABS:                        14.1   7.24  )-----------( 123      ( 13 Nov 2017 05:45 )             41.8     11-13    145  |  110<H>  |  27<H>  ----------------------------<  74  3.5   |  20<L>  |  0.69    Ca    8.0<L>      13 Nov 2017 05:45                RADIOLOGY & ADDITIONAL TESTS:    Imaging Personally Reviewed:    Consultant(s) Notes Reviewed:      Care Discussed with Consultants/Other Providers:

## 2017-11-14 PROCEDURE — 99233 SBSQ HOSP IP/OBS HIGH 50: CPT

## 2017-11-14 RX ORDER — SODIUM CHLORIDE 9 MG/ML
1000 INJECTION, SOLUTION INTRAVENOUS
Qty: 0 | Refills: 0 | Status: DISCONTINUED | OUTPATIENT
Start: 2017-11-14 | End: 2017-11-15

## 2017-11-14 RX ORDER — DIVALPROEX SODIUM 500 MG/1
1000 TABLET, DELAYED RELEASE ORAL ONCE
Qty: 0 | Refills: 0 | Status: DISCONTINUED | OUTPATIENT
Start: 2017-11-14 | End: 2017-11-14

## 2017-11-14 RX ORDER — VALPROIC ACID (AS SODIUM SALT) 250 MG/5ML
1000 SOLUTION, ORAL ORAL ONCE
Qty: 0 | Refills: 0 | Status: COMPLETED | OUTPATIENT
Start: 2017-11-14 | End: 2017-11-14

## 2017-11-14 RX ORDER — SODIUM CHLORIDE 9 MG/ML
500 INJECTION INTRAMUSCULAR; INTRAVENOUS; SUBCUTANEOUS ONCE
Qty: 0 | Refills: 0 | Status: COMPLETED | OUTPATIENT
Start: 2017-11-14 | End: 2017-11-14

## 2017-11-14 RX ADMIN — ACETAZOLAMIDE 500 MILLIGRAM(S): 250 TABLET ORAL at 18:40

## 2017-11-14 RX ADMIN — BRIMONIDINE TARTRATE 1 DROP(S): 2 SOLUTION/ DROPS OPHTHALMIC at 23:03

## 2017-11-14 RX ADMIN — CITALOPRAM 40 MILLIGRAM(S): 10 TABLET, FILM COATED ORAL at 13:12

## 2017-11-14 RX ADMIN — HEPARIN SODIUM 5000 UNIT(S): 5000 INJECTION INTRAVENOUS; SUBCUTANEOUS at 05:04

## 2017-11-14 RX ADMIN — Medication 1 MILLIGRAM(S): at 18:41

## 2017-11-14 RX ADMIN — HEPARIN SODIUM 5000 UNIT(S): 5000 INJECTION INTRAVENOUS; SUBCUTANEOUS at 23:03

## 2017-11-14 RX ADMIN — HEPARIN SODIUM 5000 UNIT(S): 5000 INJECTION INTRAVENOUS; SUBCUTANEOUS at 13:34

## 2017-11-14 RX ADMIN — Medication 1000 UNIT(S): at 13:12

## 2017-11-14 RX ADMIN — BRIMONIDINE TARTRATE 1 DROP(S): 2 SOLUTION/ DROPS OPHTHALMIC at 05:04

## 2017-11-14 RX ADMIN — Medication 650 MILLIGRAM(S): at 06:40

## 2017-11-14 RX ADMIN — POLYETHYLENE GLYCOL 3350 17 GRAM(S): 17 POWDER, FOR SOLUTION ORAL at 13:12

## 2017-11-14 RX ADMIN — TAMSULOSIN HYDROCHLORIDE 0.4 MILLIGRAM(S): 0.4 CAPSULE ORAL at 23:03

## 2017-11-14 RX ADMIN — BRIMONIDINE TARTRATE 1 DROP(S): 2 SOLUTION/ DROPS OPHTHALMIC at 13:13

## 2017-11-14 RX ADMIN — DIVALPROEX SODIUM 1000 MILLIGRAM(S): 500 TABLET, DELAYED RELEASE ORAL at 18:39

## 2017-11-14 RX ADMIN — Medication 650 MILLIGRAM(S): at 05:48

## 2017-11-14 RX ADMIN — Medication 1 DROP(S): at 05:06

## 2017-11-14 RX ADMIN — SODIUM CHLORIDE 75 MILLILITER(S): 9 INJECTION, SOLUTION INTRAVENOUS at 16:30

## 2017-11-14 RX ADMIN — Medication 1 DROP(S): at 18:40

## 2017-11-14 RX ADMIN — ACETAZOLAMIDE 500 MILLIGRAM(S): 250 TABLET ORAL at 05:05

## 2017-11-14 RX ADMIN — SODIUM CHLORIDE 75 MILLILITER(S): 9 INJECTION, SOLUTION INTRAVENOUS at 23:03

## 2017-11-14 RX ADMIN — ARIPIPRAZOLE 20 MILLIGRAM(S): 15 TABLET ORAL at 13:12

## 2017-11-14 RX ADMIN — Medication 1 MILLIGRAM(S): at 05:05

## 2017-11-14 RX ADMIN — Medication 1000 MILLIGRAM(S): at 06:54

## 2017-11-14 RX ADMIN — Medication 100 MILLIGRAM(S): at 05:04

## 2017-11-14 RX ADMIN — SODIUM CHLORIDE 250 MILLILITER(S): 9 INJECTION INTRAMUSCULAR; INTRAVENOUS; SUBCUTANEOUS at 13:34

## 2017-11-14 NOTE — PROGRESS NOTE ADULT - SUBJECTIVE AND OBJECTIVE BOX
Patient is a 62y old  Male who presents with a chief complaint of Right hip pain (11 Nov 2017 15:31)      SUBJECTIVE / OVERNIGHT EVENTS: No event overnight. Pt non-verbal; no ROS obtained.    MEDICATIONS  (STANDING):  acetazolamide   ER Capsule 500 milliGRAM(s) Oral two times a day  ARIPiprazole 20 milliGRAM(s) Oral daily  benztropine 1 milliGRAM(s) Oral two times a day  brimonidine 0.2% Ophthalmic Solution 1 Drop(s) Both EYES three times a day  cholecalciferol 1000 Unit(s) Oral daily  citalopram 40 milliGRAM(s) Oral daily  diVALproex DR 1000 milliGRAM(s) Oral two times a day  docusate sodium 100 milliGRAM(s) Oral three times a day  heparin  Injectable 5000 Unit(s) SubCutaneous every 8 hours  polyethylene glycol 3350 17 Gram(s) Oral daily  senna 2 Tablet(s) Oral at bedtime  sodium chloride 0.9%. 1000 milliLiter(s) (100 mL/Hr) IV Continuous <Continuous>  tamsulosin 0.4 milliGRAM(s) Oral at bedtime  timolol 0.5% Solution 1 Drop(s) Both EYES two times a day    MEDICATIONS  (PRN):  acetaminophen   Tablet. 650 milliGRAM(s) Oral every 6 hours PRN Moderate and severe pain  bisacodyl 10 milliGRAM(s) Oral at bedtime PRN Constipation  oxyCODONE    5 mG/acetaminophen 325 mG 1 Tablet(s) Oral every 6 hours PRN moderate to severe pain        CAPILLARY BLOOD GLUCOSE        I&O's Summary    13 Nov 2017 07:01  -  14 Nov 2017 07:00  --------------------------------------------------------  IN: 200 mL / OUT: 550 mL / NET: -350 mL        PHYSICAL EXAM:  GENERAL: NAD  HEAD:  Atraumatic, Normocephalic  CHEST/LUNG: Clear to auscultation bilaterally; No wheeze  HEART: Regular rate and rhythm; No murmurs, rubs, or gallops  ABDOMEN: Soft, Nontender, Nondistended; Bowel sounds present  EXTREMITIES:  Contracted, 2+ Peripheral Pulses, No clubbing, cyanosis, or edema    LABS:                        14.1   7.24  )-----------( 123      ( 13 Nov 2017 05:45 )             41.8     11-13    145  |  110<H>  |  27<H>  ----------------------------<  74  3.5   |  20<L>  |  0.69    Ca    8.0<L>      13 Nov 2017 05:45                RADIOLOGY & ADDITIONAL TESTS:    Imaging Personally Reviewed:    Consultant(s) Notes Reviewed:      Care Discussed with Consultants/Other Providers:

## 2017-11-15 LAB
BACTERIA BLD CULT: SIGNIFICANT CHANGE UP
BUN SERPL-MCNC: 21 MG/DL — SIGNIFICANT CHANGE UP (ref 7–23)
CALCIUM SERPL-MCNC: 8 MG/DL — LOW (ref 8.4–10.5)
CHLORIDE SERPL-SCNC: 113 MMOL/L — HIGH (ref 98–107)
CO2 SERPL-SCNC: 23 MMOL/L — SIGNIFICANT CHANGE UP (ref 22–31)
CREAT SERPL-MCNC: 0.56 MG/DL — SIGNIFICANT CHANGE UP (ref 0.5–1.3)
GLUCOSE SERPL-MCNC: 80 MG/DL — SIGNIFICANT CHANGE UP (ref 70–99)
HCT VFR BLD CALC: 40.2 % — SIGNIFICANT CHANGE UP (ref 39–50)
HGB BLD-MCNC: 13.3 G/DL — SIGNIFICANT CHANGE UP (ref 13–17)
MCHC RBC-ENTMCNC: 33.1 % — SIGNIFICANT CHANGE UP (ref 32–36)
MCHC RBC-ENTMCNC: 33.3 PG — SIGNIFICANT CHANGE UP (ref 27–34)
MCV RBC AUTO: 100.5 FL — HIGH (ref 80–100)
NRBC # FLD: 0 — SIGNIFICANT CHANGE UP
PLATELET # BLD AUTO: 119 K/UL — LOW (ref 150–400)
PMV BLD: 11.6 FL — SIGNIFICANT CHANGE UP (ref 7–13)
POTASSIUM SERPL-MCNC: 3.4 MMOL/L — LOW (ref 3.5–5.3)
POTASSIUM SERPL-SCNC: 3.4 MMOL/L — LOW (ref 3.5–5.3)
RBC # BLD: 4 M/UL — LOW (ref 4.2–5.8)
RBC # FLD: 14.6 % — HIGH (ref 10.3–14.5)
SODIUM SERPL-SCNC: 147 MMOL/L — HIGH (ref 135–145)
WBC # BLD: 5.99 K/UL — SIGNIFICANT CHANGE UP (ref 3.8–10.5)
WBC # FLD AUTO: 5.99 K/UL — SIGNIFICANT CHANGE UP (ref 3.8–10.5)

## 2017-11-15 PROCEDURE — 99233 SBSQ HOSP IP/OBS HIGH 50: CPT

## 2017-11-15 RX ORDER — POTASSIUM CHLORIDE 20 MEQ
20 PACKET (EA) ORAL ONCE
Qty: 0 | Refills: 0 | Status: DISCONTINUED | OUTPATIENT
Start: 2017-11-15 | End: 2017-11-15

## 2017-11-15 RX ORDER — SODIUM CHLORIDE 9 MG/ML
1000 INJECTION, SOLUTION INTRAVENOUS
Qty: 0 | Refills: 0 | Status: DISCONTINUED | OUTPATIENT
Start: 2017-11-15 | End: 2017-11-19

## 2017-11-15 RX ORDER — POTASSIUM CHLORIDE 20 MEQ
40 PACKET (EA) ORAL ONCE
Qty: 0 | Refills: 0 | Status: COMPLETED | OUTPATIENT
Start: 2017-11-15 | End: 2017-11-15

## 2017-11-15 RX ADMIN — Medication 1 MILLIGRAM(S): at 18:32

## 2017-11-15 RX ADMIN — Medication 1 MILLIGRAM(S): at 05:18

## 2017-11-15 RX ADMIN — SODIUM CHLORIDE 75 MILLILITER(S): 9 INJECTION, SOLUTION INTRAVENOUS at 11:22

## 2017-11-15 RX ADMIN — HEPARIN SODIUM 5000 UNIT(S): 5000 INJECTION INTRAVENOUS; SUBCUTANEOUS at 05:17

## 2017-11-15 RX ADMIN — BRIMONIDINE TARTRATE 1 DROP(S): 2 SOLUTION/ DROPS OPHTHALMIC at 05:17

## 2017-11-15 RX ADMIN — HEPARIN SODIUM 5000 UNIT(S): 5000 INJECTION INTRAVENOUS; SUBCUTANEOUS at 14:41

## 2017-11-15 RX ADMIN — Medication 1 DROP(S): at 18:32

## 2017-11-15 RX ADMIN — ACETAZOLAMIDE 500 MILLIGRAM(S): 250 TABLET ORAL at 18:32

## 2017-11-15 RX ADMIN — Medication 40 MILLIEQUIVALENT(S): at 14:41

## 2017-11-15 RX ADMIN — Medication 1 DROP(S): at 05:17

## 2017-11-15 RX ADMIN — BRIMONIDINE TARTRATE 1 DROP(S): 2 SOLUTION/ DROPS OPHTHALMIC at 21:13

## 2017-11-15 RX ADMIN — BRIMONIDINE TARTRATE 1 DROP(S): 2 SOLUTION/ DROPS OPHTHALMIC at 14:41

## 2017-11-15 RX ADMIN — TAMSULOSIN HYDROCHLORIDE 0.4 MILLIGRAM(S): 0.4 CAPSULE ORAL at 21:13

## 2017-11-15 RX ADMIN — HEPARIN SODIUM 5000 UNIT(S): 5000 INJECTION INTRAVENOUS; SUBCUTANEOUS at 21:13

## 2017-11-15 RX ADMIN — DIVALPROEX SODIUM 1000 MILLIGRAM(S): 500 TABLET, DELAYED RELEASE ORAL at 18:32

## 2017-11-15 RX ADMIN — Medication 1000 UNIT(S): at 14:41

## 2017-11-15 RX ADMIN — SODIUM CHLORIDE 75 MILLILITER(S): 9 INJECTION, SOLUTION INTRAVENOUS at 21:13

## 2017-11-15 RX ADMIN — ACETAZOLAMIDE 500 MILLIGRAM(S): 250 TABLET ORAL at 06:10

## 2017-11-15 RX ADMIN — ARIPIPRAZOLE 20 MILLIGRAM(S): 15 TABLET ORAL at 14:41

## 2017-11-15 RX ADMIN — CITALOPRAM 40 MILLIGRAM(S): 10 TABLET, FILM COATED ORAL at 14:41

## 2017-11-15 RX ADMIN — DIVALPROEX SODIUM 1000 MILLIGRAM(S): 500 TABLET, DELAYED RELEASE ORAL at 05:18

## 2017-11-15 NOTE — SWALLOW BEDSIDE ASSESSMENT ADULT - ORAL PHASE
Delayed AP transport Delayed AP transport with lingual residue (cleared with liquid wash) Within functional limits

## 2017-11-15 NOTE — PROGRESS NOTE ADULT - SUBJECTIVE AND OBJECTIVE BOX
Patient is a 62y old  Male who presents with a chief complaint of Right hip pain (11 Nov 2017 15:31)        SUBJECTIVE / OVERNIGHT EVENTS: No overnight event. Pt non-verbal; no ROS obtained.      MEDICATIONS  (STANDING):  acetazolamide   ER Capsule 500 milliGRAM(s) Oral two times a day  ARIPiprazole 20 milliGRAM(s) Oral daily  benztropine 1 milliGRAM(s) Oral two times a day  brimonidine 0.2% Ophthalmic Solution 1 Drop(s) Both EYES three times a day  cholecalciferol 1000 Unit(s) Oral daily  citalopram 40 milliGRAM(s) Oral daily  dextrose 5%. 1000 milliLiter(s) (75 mL/Hr) IV Continuous <Continuous>  diVALproex DR 1000 milliGRAM(s) Oral two times a day  docusate sodium 100 milliGRAM(s) Oral three times a day  heparin  Injectable 5000 Unit(s) SubCutaneous every 8 hours  polyethylene glycol 3350 17 Gram(s) Oral daily  potassium chloride    Tablet ER 40 milliEquivalent(s) Oral once  senna 2 Tablet(s) Oral at bedtime  tamsulosin 0.4 milliGRAM(s) Oral at bedtime  timolol 0.5% Solution 1 Drop(s) Both EYES two times a day    MEDICATIONS  (PRN):  acetaminophen   Tablet. 650 milliGRAM(s) Oral every 6 hours PRN Moderate and severe pain  bisacodyl 10 milliGRAM(s) Oral at bedtime PRN Constipation  oxyCODONE    5 mG/acetaminophen 325 mG 1 Tablet(s) Oral every 6 hours PRN moderate to severe pain      Vital Signs Last 24 Hrs  T(C): 36.4 (15 Nov 2017 05:08), Max: 37 (14 Nov 2017 22:47)  T(F): 97.6 (15 Nov 2017 05:08), Max: 98.6 (14 Nov 2017 22:47)  HR: 85 (15 Nov 2017 05:08) (62 - 85)  BP: 122/92 (15 Nov 2017 05:08) (78/60 - 128/97)  BP(mean): --  RR: 18 (15 Nov 2017 05:08) (17 - 18)  SpO2: 97% (15 Nov 2017 05:08) (97% - 100%)  CAPILLARY BLOOD GLUCOSE        I&O's Summary    14 Nov 2017 07:01  -  15 Nov 2017 07:00  --------------------------------------------------------  IN: 1615 mL / OUT: 1500 mL / NET: 115 mL          PHYSICAL EXAM  GENERAL: NAD, non-verbal  HEAD:  Atraumatic, Normocephalic  EYES: EOMI, PERRLA, conjunctiva and sclera clear  NECK: Supple, No JVD  CHEST/LUNG: Clear to auscultation bilaterally; No wheeze  HEART: Regular rate and rhythm; No murmurs, rubs, or gallops  ABDOMEN: Soft, Nontender, Nondistended; Bowel sounds present  EXTREMITIES:  2+ Peripheral Pulses, No clubbing, cyanosis, or edema  PSYCH: becomes mildly agitated when spoken to, but calms down without intervention  LABS:                        13.3   5.99  )-----------( 119      ( 15 Nov 2017 05:30 )             40.2     11-15    147<H>  |  113<H>  |  21  ----------------------------<  80  3.4<L>   |  23  |  0.56    Ca    8.0<L>      15 Nov 2017 05:30                RADIOLOGY & ADDITIONAL TESTS:    Imaging Personally Reviewed:  Consultant(s) Notes Reviewed:    Care Discussed with Consultants/Other Providers:      IMPROVE VTE Individual Risk Assessment          RISK                                                          Points    [  ] Previous VTE                                                3    [  ] Thrombophilia                                             2    [ x ] Lower limb paralysis                                    2        (unable to hold up >15 seconds)      [  ] Current Cancer                                             2         (within 6 months)    [ x ] Immobilization > 24 hrs                              1    [  ] ICU/CCU stay > 24 hours                            1    [ x ] Age > 60                                                    1    IMPROVE VTE Score ____4_____

## 2017-11-15 NOTE — SWALLOW BEDSIDE ASSESSMENT ADULT - ASR SWALLOW ASPIRATION MONITOR
position upright (89Y)/cough/gurgly voice/fever/pneumonia/throat clearing/change of breathing pattern/upper respiratory infection/oral hygiene

## 2017-11-15 NOTE — SWALLOW BEDSIDE ASSESSMENT ADULT - ORAL PREPARATORY PHASE
Delayed bolus collection and manipulation with excessive lingual sweeping Slow mastication, Delayed bolus collection and manipulation, Excessive lingual sweeping Within functional limits

## 2017-11-15 NOTE — SWALLOW BEDSIDE ASSESSMENT ADULT - SWALLOW EVAL: DIAGNOSIS
The patient demonstrates a Mild to Moderate Oral Dysphagia characterized by adequate oral acceptance and containment, delayed bolus collection, manipulation and transport with excessive lingual sweeping, and adequate oral clearance for puree and mechanical soft. Slow/effortful mastication and lingual residue noted with regular solids, requiring liquid wash to clear. Timely AP transport noted with thin liquids. The patient demonstrates a functional pharyngeal stage of swallow characterized by timely trigger with hyolaryngeal excursion appreciated upon palpation. There were no overt, clinical s/s penetration vs aspiration noted across all consistencies/trials.

## 2017-11-15 NOTE — SWALLOW BEDSIDE ASSESSMENT ADULT - COMMENTS
MD orders received. Chart reviewed. Swallow evaluation not initiated. Patient received lethargic with brief response to max verbal/tactile stimuli evidenced via opening eyes, though unable to sustain adequate level of alertness to safely administer PO trials. Defer PO diet to MD at this time; pt presently on soft diet with thin liquids. SLP to f/u as schedule permits to determine candidacy for full dysphagia evaluation. Above discussed with RN.
SLP f/u for full dysphagia evaluation as per plan. Patient A+Ox2, able to follow low level directives given cues, and minimally verbal with reduced initiation of basic wants/needs. Respiratory status appeared adequate on room air. Vocal quality unremarkable on sparse speech output. HOB elevated to suboptimal position due to patient appearing to be pain. RN present at bedside.    Per charting, patient is a 61 y/o male with dx intellectual disability, mood disorder, psychotic disorder, PTSD, anxiety and glaucoma. Pt presented to ED from group home with chronic R hip pain, found to have deformity of left hip 2/2 old femoral fracture; no acute surgical intervention warranted at this time.

## 2017-11-16 LAB
BACTERIA BLD CULT: SIGNIFICANT CHANGE UP
BACTERIA BLD CULT: SIGNIFICANT CHANGE UP
BUN SERPL-MCNC: 11 MG/DL — SIGNIFICANT CHANGE UP (ref 7–23)
CALCIUM SERPL-MCNC: 8.1 MG/DL — LOW (ref 8.4–10.5)
CHLORIDE SERPL-SCNC: 103 MMOL/L — SIGNIFICANT CHANGE UP (ref 98–107)
CO2 SERPL-SCNC: 22 MMOL/L — SIGNIFICANT CHANGE UP (ref 22–31)
CREAT SERPL-MCNC: 0.54 MG/DL — SIGNIFICANT CHANGE UP (ref 0.5–1.3)
GLUCOSE SERPL-MCNC: 96 MG/DL — SIGNIFICANT CHANGE UP (ref 70–99)
POTASSIUM SERPL-MCNC: 3.1 MMOL/L — LOW (ref 3.5–5.3)
POTASSIUM SERPL-SCNC: 3.1 MMOL/L — LOW (ref 3.5–5.3)
SODIUM SERPL-SCNC: 142 MMOL/L — SIGNIFICANT CHANGE UP (ref 135–145)

## 2017-11-16 PROCEDURE — 99233 SBSQ HOSP IP/OBS HIGH 50: CPT

## 2017-11-16 RX ORDER — OXYCODONE AND ACETAMINOPHEN 5; 325 MG/1; MG/1
1 TABLET ORAL EVERY 6 HOURS
Qty: 0 | Refills: 0 | Status: DISCONTINUED | OUTPATIENT
Start: 2017-11-16 | End: 2017-11-22

## 2017-11-16 RX ORDER — POTASSIUM CHLORIDE 20 MEQ
40 PACKET (EA) ORAL ONCE
Qty: 0 | Refills: 0 | Status: COMPLETED | OUTPATIENT
Start: 2017-11-16 | End: 2017-11-16

## 2017-11-16 RX ADMIN — TAMSULOSIN HYDROCHLORIDE 0.4 MILLIGRAM(S): 0.4 CAPSULE ORAL at 23:10

## 2017-11-16 RX ADMIN — OXYCODONE AND ACETAMINOPHEN 1 TABLET(S): 5; 325 TABLET ORAL at 06:48

## 2017-11-16 RX ADMIN — HEPARIN SODIUM 5000 UNIT(S): 5000 INJECTION INTRAVENOUS; SUBCUTANEOUS at 14:44

## 2017-11-16 RX ADMIN — Medication 1000 UNIT(S): at 14:45

## 2017-11-16 RX ADMIN — SODIUM CHLORIDE 75 MILLILITER(S): 9 INJECTION, SOLUTION INTRAVENOUS at 07:30

## 2017-11-16 RX ADMIN — ACETAZOLAMIDE 500 MILLIGRAM(S): 250 TABLET ORAL at 05:34

## 2017-11-16 RX ADMIN — HEPARIN SODIUM 5000 UNIT(S): 5000 INJECTION INTRAVENOUS; SUBCUTANEOUS at 23:09

## 2017-11-16 RX ADMIN — OXYCODONE AND ACETAMINOPHEN 1 TABLET(S): 5; 325 TABLET ORAL at 05:34

## 2017-11-16 RX ADMIN — Medication 40 MILLIEQUIVALENT(S): at 17:42

## 2017-11-16 RX ADMIN — BRIMONIDINE TARTRATE 1 DROP(S): 2 SOLUTION/ DROPS OPHTHALMIC at 14:44

## 2017-11-16 RX ADMIN — DIVALPROEX SODIUM 1000 MILLIGRAM(S): 500 TABLET, DELAYED RELEASE ORAL at 17:42

## 2017-11-16 RX ADMIN — Medication 1 MILLIGRAM(S): at 05:34

## 2017-11-16 RX ADMIN — Medication 1 DROP(S): at 05:34

## 2017-11-16 RX ADMIN — Medication 100 MILLIGRAM(S): at 14:44

## 2017-11-16 RX ADMIN — DIVALPROEX SODIUM 1000 MILLIGRAM(S): 500 TABLET, DELAYED RELEASE ORAL at 05:34

## 2017-11-16 RX ADMIN — Medication 1 DROP(S): at 17:43

## 2017-11-16 RX ADMIN — BRIMONIDINE TARTRATE 1 DROP(S): 2 SOLUTION/ DROPS OPHTHALMIC at 23:09

## 2017-11-16 RX ADMIN — Medication 100 MILLIGRAM(S): at 05:34

## 2017-11-16 RX ADMIN — HEPARIN SODIUM 5000 UNIT(S): 5000 INJECTION INTRAVENOUS; SUBCUTANEOUS at 05:34

## 2017-11-16 RX ADMIN — ACETAZOLAMIDE 500 MILLIGRAM(S): 250 TABLET ORAL at 17:43

## 2017-11-16 RX ADMIN — SENNA PLUS 2 TABLET(S): 8.6 TABLET ORAL at 23:09

## 2017-11-16 RX ADMIN — CITALOPRAM 40 MILLIGRAM(S): 10 TABLET, FILM COATED ORAL at 14:44

## 2017-11-16 RX ADMIN — ARIPIPRAZOLE 20 MILLIGRAM(S): 15 TABLET ORAL at 14:44

## 2017-11-16 RX ADMIN — POLYETHYLENE GLYCOL 3350 17 GRAM(S): 17 POWDER, FOR SOLUTION ORAL at 14:44

## 2017-11-16 RX ADMIN — Medication 1 MILLIGRAM(S): at 17:43

## 2017-11-16 RX ADMIN — BRIMONIDINE TARTRATE 1 DROP(S): 2 SOLUTION/ DROPS OPHTHALMIC at 05:34

## 2017-11-16 RX ADMIN — Medication 100 MILLIGRAM(S): at 23:10

## 2017-11-16 NOTE — PROGRESS NOTE ADULT - SUBJECTIVE AND OBJECTIVE BOX
Patient is a 62y old  Male who presents with a chief complaint of Right hip pain (11 Nov 2017 15:31)        SUBJECTIVE / OVERNIGHT EVENTS: Larry was removed for TOV yesterday, and the patient was observed to be incontinent of urine overnight.      MEDICATIONS  (STANDING):  acetazolamide   ER Capsule 500 milliGRAM(s) Oral two times a day  ARIPiprazole 20 milliGRAM(s) Oral daily  benztropine 1 milliGRAM(s) Oral two times a day  brimonidine 0.2% Ophthalmic Solution 1 Drop(s) Both EYES three times a day  cholecalciferol 1000 Unit(s) Oral daily  citalopram 40 milliGRAM(s) Oral daily  dextrose 5%. 1000 milliLiter(s) (75 mL/Hr) IV Continuous <Continuous>  diVALproex DR 1000 milliGRAM(s) Oral two times a day  docusate sodium 100 milliGRAM(s) Oral three times a day  heparin  Injectable 5000 Unit(s) SubCutaneous every 8 hours  polyethylene glycol 3350 17 Gram(s) Oral daily  potassium chloride    Tablet ER 40 milliEquivalent(s) Oral once  senna 2 Tablet(s) Oral at bedtime  tamsulosin 0.4 milliGRAM(s) Oral at bedtime  timolol 0.5% Solution 1 Drop(s) Both EYES two times a day    MEDICATIONS  (PRN):  acetaminophen   Tablet. 650 milliGRAM(s) Oral every 6 hours PRN Moderate and severe pain  bisacodyl 10 milliGRAM(s) Oral at bedtime PRN Constipation  oxyCODONE    5 mG/acetaminophen 325 mG 1 Tablet(s) Oral every 6 hours PRN moderate to severe pain      Vital Signs Last 24 Hrs  T(C): 36.4 (16 Nov 2017 14:37), Max: 36.7 (15 Nov 2017 20:43)  T(F): 97.6 (16 Nov 2017 14:37), Max: 98 (15 Nov 2017 20:43)  HR: 57 (16 Nov 2017 14:37) (57 - 72)  BP: 107/60 (16 Nov 2017 14:37) (107/60 - 114/77)  BP(mean): --  RR: 18 (16 Nov 2017 14:37) (17 - 18)  SpO2: 100% (16 Nov 2017 14:37) (99% - 100%)  CAPILLARY BLOOD GLUCOSE        I&O's Summary    15 Nov 2017 07:01  -  16 Nov 2017 07:00  --------------------------------------------------------  IN: 1235 mL / OUT: 150 mL / NET: 1085 mL          PHYSICAL EXAM  GENERAL: NAD, non-verbal  HEAD:  Atraumatic, Normocephalic  EYES: EOMI, PERRLA, conjunctiva and sclera clear  CHEST/LUNG: Clear to auscultation bilaterally; No wheeze  HEART: Regular rate and rhythm; No murmurs, rubs, or gallops  ABDOMEN: Soft, Nontender, Nondistended; Bowel sounds present  EXTREMITIES:  2+ Peripheral Pulses, No clubbing, cyanosis, or edema      LABS:                        13.3   5.99  )-----------( 119      ( 15 Nov 2017 05:30 )             40.2     11-16    142  |  103  |  11  ----------------------------<  96  3.1<L>   |  22  |  0.54    Ca    8.1<L>      16 Nov 2017 05:50                RADIOLOGY & ADDITIONAL TESTS:    Imaging Personally Reviewed:  Consultant(s) Notes Reviewed:    Care Discussed with Consultants/Other Providers:      IMPROVE VTE Individual Risk Assessment          RISK                                                          Points    [  ] Previous VTE                                                3    [  ] Thrombophilia                                             2    [  ] Lower limb paralysis                                    2        (unable to hold up >15 seconds)      [  ] Current Cancer                                             2         (within 6 months)    [  ] Immobilization > 24 hrs                              1    [  ] ICU/CCU stay > 24 hours                            1    [  ] Age > 60                                                    1    IMPROVE VTE Score _________

## 2017-11-17 DIAGNOSIS — E87.6 HYPOKALEMIA: ICD-10-CM

## 2017-11-17 LAB
BUN SERPL-MCNC: 7 MG/DL — SIGNIFICANT CHANGE UP (ref 7–23)
CALCIUM SERPL-MCNC: 7.9 MG/DL — LOW (ref 8.4–10.5)
CHLORIDE SERPL-SCNC: 106 MMOL/L — SIGNIFICANT CHANGE UP (ref 98–107)
CO2 SERPL-SCNC: 24 MMOL/L — SIGNIFICANT CHANGE UP (ref 22–31)
CREAT SERPL-MCNC: 0.59 MG/DL — SIGNIFICANT CHANGE UP (ref 0.5–1.3)
GLUCOSE SERPL-MCNC: 82 MG/DL — SIGNIFICANT CHANGE UP (ref 70–99)
HCT VFR BLD CALC: 38.9 % — LOW (ref 39–50)
HGB BLD-MCNC: 13 G/DL — SIGNIFICANT CHANGE UP (ref 13–17)
MCHC RBC-ENTMCNC: 32.7 PG — SIGNIFICANT CHANGE UP (ref 27–34)
MCHC RBC-ENTMCNC: 33.4 % — SIGNIFICANT CHANGE UP (ref 32–36)
MCV RBC AUTO: 97.7 FL — SIGNIFICANT CHANGE UP (ref 80–100)
NRBC # FLD: 0 — SIGNIFICANT CHANGE UP
PLATELET # BLD AUTO: 122 K/UL — LOW (ref 150–400)
PMV BLD: 11.7 FL — SIGNIFICANT CHANGE UP (ref 7–13)
POTASSIUM SERPL-MCNC: 3.1 MMOL/L — LOW (ref 3.5–5.3)
POTASSIUM SERPL-SCNC: 3.1 MMOL/L — LOW (ref 3.5–5.3)
RBC # BLD: 3.98 M/UL — LOW (ref 4.2–5.8)
RBC # FLD: 14.4 % — SIGNIFICANT CHANGE UP (ref 10.3–14.5)
SODIUM SERPL-SCNC: 141 MMOL/L — SIGNIFICANT CHANGE UP (ref 135–145)
WBC # BLD: 6.28 K/UL — SIGNIFICANT CHANGE UP (ref 3.8–10.5)
WBC # FLD AUTO: 6.28 K/UL — SIGNIFICANT CHANGE UP (ref 3.8–10.5)

## 2017-11-17 PROCEDURE — 99233 SBSQ HOSP IP/OBS HIGH 50: CPT

## 2017-11-17 RX ORDER — POTASSIUM CHLORIDE 20 MEQ
40 PACKET (EA) ORAL EVERY 4 HOURS
Qty: 0 | Refills: 0 | Status: DISCONTINUED | OUTPATIENT
Start: 2017-11-17 | End: 2017-11-17

## 2017-11-17 RX ORDER — POTASSIUM CHLORIDE 20 MEQ
40 PACKET (EA) ORAL EVERY 4 HOURS
Qty: 0 | Refills: 0 | Status: COMPLETED | OUTPATIENT
Start: 2017-11-17 | End: 2017-11-17

## 2017-11-17 RX ADMIN — HEPARIN SODIUM 5000 UNIT(S): 5000 INJECTION INTRAVENOUS; SUBCUTANEOUS at 14:09

## 2017-11-17 RX ADMIN — ARIPIPRAZOLE 20 MILLIGRAM(S): 15 TABLET ORAL at 14:08

## 2017-11-17 RX ADMIN — BRIMONIDINE TARTRATE 1 DROP(S): 2 SOLUTION/ DROPS OPHTHALMIC at 14:07

## 2017-11-17 RX ADMIN — BRIMONIDINE TARTRATE 1 DROP(S): 2 SOLUTION/ DROPS OPHTHALMIC at 22:25

## 2017-11-17 RX ADMIN — Medication 1000 UNIT(S): at 14:09

## 2017-11-17 RX ADMIN — DIVALPROEX SODIUM 1000 MILLIGRAM(S): 500 TABLET, DELAYED RELEASE ORAL at 19:05

## 2017-11-17 RX ADMIN — SODIUM CHLORIDE 75 MILLILITER(S): 9 INJECTION, SOLUTION INTRAVENOUS at 22:25

## 2017-11-17 RX ADMIN — ACETAZOLAMIDE 500 MILLIGRAM(S): 250 TABLET ORAL at 19:05

## 2017-11-17 RX ADMIN — HEPARIN SODIUM 5000 UNIT(S): 5000 INJECTION INTRAVENOUS; SUBCUTANEOUS at 22:25

## 2017-11-17 RX ADMIN — DIVALPROEX SODIUM 1000 MILLIGRAM(S): 500 TABLET, DELAYED RELEASE ORAL at 06:43

## 2017-11-17 RX ADMIN — HEPARIN SODIUM 5000 UNIT(S): 5000 INJECTION INTRAVENOUS; SUBCUTANEOUS at 06:43

## 2017-11-17 RX ADMIN — Medication 1 MILLIGRAM(S): at 06:42

## 2017-11-17 RX ADMIN — Medication 100 MILLIGRAM(S): at 14:09

## 2017-11-17 RX ADMIN — TAMSULOSIN HYDROCHLORIDE 0.4 MILLIGRAM(S): 0.4 CAPSULE ORAL at 22:25

## 2017-11-17 RX ADMIN — ACETAZOLAMIDE 500 MILLIGRAM(S): 250 TABLET ORAL at 06:42

## 2017-11-17 RX ADMIN — POLYETHYLENE GLYCOL 3350 17 GRAM(S): 17 POWDER, FOR SOLUTION ORAL at 14:09

## 2017-11-17 RX ADMIN — SODIUM CHLORIDE 75 MILLILITER(S): 9 INJECTION, SOLUTION INTRAVENOUS at 02:45

## 2017-11-17 RX ADMIN — Medication 40 MILLIEQUIVALENT(S): at 19:05

## 2017-11-17 RX ADMIN — BRIMONIDINE TARTRATE 1 DROP(S): 2 SOLUTION/ DROPS OPHTHALMIC at 06:43

## 2017-11-17 RX ADMIN — Medication 40 MILLIEQUIVALENT(S): at 14:09

## 2017-11-17 RX ADMIN — Medication 1 DROP(S): at 06:43

## 2017-11-17 RX ADMIN — Medication 100 MILLIGRAM(S): at 06:42

## 2017-11-17 RX ADMIN — Medication 1 MILLIGRAM(S): at 19:05

## 2017-11-17 RX ADMIN — Medication 1 DROP(S): at 19:05

## 2017-11-17 RX ADMIN — CITALOPRAM 40 MILLIGRAM(S): 10 TABLET, FILM COATED ORAL at 14:08

## 2017-11-17 NOTE — PROGRESS NOTE ADULT - PROBLEM SELECTOR PLAN 6
- Per paperwork from patient's group home, patient has a history of constipation. No clinical signs of obstruction.  - c/w Senna/Colace/Miraalax   -monitor BMS

## 2017-11-17 NOTE — PROGRESS NOTE ADULT - SUBJECTIVE AND OBJECTIVE BOX
Patient is a 62y old  Male who presents with a chief complaint of Right hip pain (11 Nov 2017 15:31)        SUBJECTIVE / OVERNIGHT EVENTS: This AM, patient feels well. No reported f/c/n/v/d/cough/chest pain/sob.    MEDICATIONS  (STANDING):  acetazolamide   ER Capsule 500 milliGRAM(s) Oral two times a day  ARIPiprazole 20 milliGRAM(s) Oral daily  benztropine 1 milliGRAM(s) Oral two times a day  brimonidine 0.2% Ophthalmic Solution 1 Drop(s) Both EYES three times a day  cholecalciferol 1000 Unit(s) Oral daily  citalopram 40 milliGRAM(s) Oral daily  dextrose 5%. 1000 milliLiter(s) (75 mL/Hr) IV Continuous <Continuous>  diVALproex DR 1000 milliGRAM(s) Oral two times a day  docusate sodium 100 milliGRAM(s) Oral three times a day  heparin  Injectable 5000 Unit(s) SubCutaneous every 8 hours  polyethylene glycol 3350 17 Gram(s) Oral daily  potassium chloride    Tablet ER 40 milliEquivalent(s) Oral every 4 hours  senna 2 Tablet(s) Oral at bedtime  tamsulosin 0.4 milliGRAM(s) Oral at bedtime  timolol 0.5% Solution 1 Drop(s) Both EYES two times a day    MEDICATIONS  (PRN):  acetaminophen   Tablet. 650 milliGRAM(s) Oral every 6 hours PRN Mild Pain (1 - 3)  bisacodyl 10 milliGRAM(s) Oral at bedtime PRN Constipation  oxyCODONE    5 mG/acetaminophen 325 mG 1 Tablet(s) Oral every 6 hours PRN moderate to severe pain    Vital Signs Last 24 Hrs  T(C): 36.9 (17 Nov 2017 13:15), Max: 36.9 (17 Nov 2017 13:15)  T(F): 98.5 (17 Nov 2017 13:15), Max: 98.5 (17 Nov 2017 13:15)  HR: 74 (17 Nov 2017 13:15) (57 - 85)  BP: 112/50 (17 Nov 2017 13:15) (95/62 - 112/50)  BP(mean): --  RR: 18 (17 Nov 2017 13:15) (18 - 18)  SpO2: 100% (17 Nov 2017 13:15) (96% - 100%)    PHYSICAL EXAM  GENERAL: NAD, wn/wd  HEAD:  Atraumatic, Normocephalic  EYES: EOMI, PERRLA, conjunctiva and sclera clear  CHEST/LUNG: Clear to auscultation bilaterally; No wheeze  HEART: Regular rate and rhythm; No murmurs, rubs, or gallops  ABDOMEN: Soft, Nontender, Nondistended; Bowel sounds present  EXTREMITIES:  2+ Peripheral Pulses, No clubbing, cyanosis, or edema      LABS:                     13.0   6.28  )-----------( 122      ( 17 Nov 2017 06:45 )             38.9     11-17    141  |  106  |  7   ----------------------------<  82  3.1<L>   |  24  |  0.59    Ca    7.9<L>      17 Nov 2017 06:45        RADIOLOGY & ADDITIONAL TESTS:    Imaging Personally Reviewed:  Consultant(s) Notes Reviewed:    Care Discussed with Consultants/Other Providers:

## 2017-11-18 PROCEDURE — 99233 SBSQ HOSP IP/OBS HIGH 50: CPT

## 2017-11-18 RX ORDER — POTASSIUM CHLORIDE 20 MEQ
40 PACKET (EA) ORAL EVERY 4 HOURS
Qty: 0 | Refills: 0 | Status: COMPLETED | OUTPATIENT
Start: 2017-11-18 | End: 2017-11-18

## 2017-11-18 RX ADMIN — Medication 40 MILLIEQUIVALENT(S): at 23:04

## 2017-11-18 RX ADMIN — HEPARIN SODIUM 5000 UNIT(S): 5000 INJECTION INTRAVENOUS; SUBCUTANEOUS at 07:50

## 2017-11-18 RX ADMIN — BRIMONIDINE TARTRATE 1 DROP(S): 2 SOLUTION/ DROPS OPHTHALMIC at 07:50

## 2017-11-18 RX ADMIN — BRIMONIDINE TARTRATE 1 DROP(S): 2 SOLUTION/ DROPS OPHTHALMIC at 15:22

## 2017-11-18 RX ADMIN — Medication 1 MILLIGRAM(S): at 18:36

## 2017-11-18 RX ADMIN — ACETAZOLAMIDE 500 MILLIGRAM(S): 250 TABLET ORAL at 07:49

## 2017-11-18 RX ADMIN — Medication 1 DROP(S): at 07:50

## 2017-11-18 RX ADMIN — Medication 40 MILLIEQUIVALENT(S): at 18:36

## 2017-11-18 RX ADMIN — ACETAZOLAMIDE 500 MILLIGRAM(S): 250 TABLET ORAL at 18:36

## 2017-11-18 RX ADMIN — HEPARIN SODIUM 5000 UNIT(S): 5000 INJECTION INTRAVENOUS; SUBCUTANEOUS at 15:21

## 2017-11-18 RX ADMIN — CITALOPRAM 40 MILLIGRAM(S): 10 TABLET, FILM COATED ORAL at 15:22

## 2017-11-18 RX ADMIN — DIVALPROEX SODIUM 1000 MILLIGRAM(S): 500 TABLET, DELAYED RELEASE ORAL at 18:36

## 2017-11-18 RX ADMIN — DIVALPROEX SODIUM 1000 MILLIGRAM(S): 500 TABLET, DELAYED RELEASE ORAL at 07:49

## 2017-11-18 RX ADMIN — Medication 1000 UNIT(S): at 15:22

## 2017-11-18 RX ADMIN — ARIPIPRAZOLE 20 MILLIGRAM(S): 15 TABLET ORAL at 15:21

## 2017-11-18 RX ADMIN — SODIUM CHLORIDE 75 MILLILITER(S): 9 INJECTION, SOLUTION INTRAVENOUS at 07:30

## 2017-11-18 RX ADMIN — HEPARIN SODIUM 5000 UNIT(S): 5000 INJECTION INTRAVENOUS; SUBCUTANEOUS at 23:04

## 2017-11-18 RX ADMIN — Medication 1 DROP(S): at 18:36

## 2017-11-18 RX ADMIN — BRIMONIDINE TARTRATE 1 DROP(S): 2 SOLUTION/ DROPS OPHTHALMIC at 23:04

## 2017-11-18 RX ADMIN — Medication 1 MILLIGRAM(S): at 07:49

## 2017-11-18 RX ADMIN — TAMSULOSIN HYDROCHLORIDE 0.4 MILLIGRAM(S): 0.4 CAPSULE ORAL at 23:04

## 2017-11-18 NOTE — PROGRESS NOTE ADULT - SUBJECTIVE AND OBJECTIVE BOX
Patient is a 62y old  Male who presents with a chief complaint of Right hip pain (11 Nov 2017 15:31)    SUBJECTIVE / OVERNIGHT EVENTS: This AM, patient feels well. No reported f/c/n/v/d/cough/chest pain/sob.    MEDICATIONS  (STANDING):  acetazolamide   ER Capsule 500 milliGRAM(s) Oral two times a day  ARIPiprazole 20 milliGRAM(s) Oral daily  benztropine 1 milliGRAM(s) Oral two times a day  brimonidine 0.2% Ophthalmic Solution 1 Drop(s) Both EYES three times a day  cholecalciferol 1000 Unit(s) Oral daily  citalopram 40 milliGRAM(s) Oral daily  dextrose 5%. 1000 milliLiter(s) (75 mL/Hr) IV Continuous <Continuous>  diVALproex DR 1000 milliGRAM(s) Oral two times a day  docusate sodium 100 milliGRAM(s) Oral three times a day  heparin  Injectable 5000 Unit(s) SubCutaneous every 8 hours  polyethylene glycol 3350 17 Gram(s) Oral daily  senna 2 Tablet(s) Oral at bedtime  tamsulosin 0.4 milliGRAM(s) Oral at bedtime  timolol 0.5% Solution 1 Drop(s) Both EYES two times a day    MEDICATIONS  (PRN):  acetaminophen   Tablet. 650 milliGRAM(s) Oral every 6 hours PRN Mild Pain (1 - 3)  bisacodyl 10 milliGRAM(s) Oral at bedtime PRN Constipation  oxyCODONE    5 mG/acetaminophen 325 mG 1 Tablet(s) Oral every 6 hours PRN moderate to severe pain    Vital Signs Last 24 Hrs  T(C): 36.6 (18 Nov 2017 13:30), Max: 36.9 (17 Nov 2017 22:01)  T(F): 97.9 (18 Nov 2017 13:30), Max: 98.5 (17 Nov 2017 22:01)  HR: 90 (18 Nov 2017 13:30) (62 - 90)  BP: 107/63 (18 Nov 2017 13:30) (102/74 - 122/63)  BP(mean): --  RR: 17 (18 Nov 2017 13:30) (17 - 18)  SpO2: 97% (18 Nov 2017 13:30) (95% - 97%)    CAPILLARY BLOOD GLUCOSE    PHYSICAL EXAM  GENERAL: NAD, wn/wd  HEAD:  Atraumatic, Normocephalic  EYES: EOMI, PERRLA, conjunctiva and sclera clear  CHEST/LUNG: Clear to auscultation bilaterally; No wheeze  HEART: Regular rate and rhythm; No murmurs, rubs, or gallops  ABDOMEN: Soft, Nontender, Nondistended; Bowel sounds present  EXTREMITIES:  2+ Peripheral Pulses, No clubbing, cyanosis, or edema      LABS:                                13.0   6.28  )-----------( 122      ( 17 Nov 2017 06:45 )             38.9     11-17    141  |  106  |  7   ----------------------------<  82  3.1<L>   |  24  |  0.59    Ca    7.9<L>      17 Nov 2017 06:45        RADIOLOGY & ADDITIONAL TESTS:    Imaging Personally Reviewed:  Consultant(s) Notes Reviewed:    Care Discussed with Consultants/Other Providers:

## 2017-11-19 LAB
BUN SERPL-MCNC: 10 MG/DL — SIGNIFICANT CHANGE UP (ref 7–23)
CALCIUM SERPL-MCNC: 8 MG/DL — LOW (ref 8.4–10.5)
CHLORIDE SERPL-SCNC: 106 MMOL/L — SIGNIFICANT CHANGE UP (ref 98–107)
CO2 SERPL-SCNC: 24 MMOL/L — SIGNIFICANT CHANGE UP (ref 22–31)
CREAT SERPL-MCNC: 0.68 MG/DL — SIGNIFICANT CHANGE UP (ref 0.5–1.3)
GLUCOSE SERPL-MCNC: 83 MG/DL — SIGNIFICANT CHANGE UP (ref 70–99)
HCT VFR BLD CALC: 40.3 % — SIGNIFICANT CHANGE UP (ref 39–50)
HGB BLD-MCNC: 13.5 G/DL — SIGNIFICANT CHANGE UP (ref 13–17)
MAGNESIUM SERPL-MCNC: 2 MG/DL — SIGNIFICANT CHANGE UP (ref 1.6–2.6)
MCHC RBC-ENTMCNC: 33.5 % — SIGNIFICANT CHANGE UP (ref 32–36)
MCHC RBC-ENTMCNC: 33.9 PG — SIGNIFICANT CHANGE UP (ref 27–34)
MCV RBC AUTO: 101.3 FL — HIGH (ref 80–100)
NRBC # FLD: 0 — SIGNIFICANT CHANGE UP
PLATELET # BLD AUTO: 125 K/UL — LOW (ref 150–400)
PMV BLD: 11.9 FL — SIGNIFICANT CHANGE UP (ref 7–13)
POTASSIUM SERPL-MCNC: 4.7 MMOL/L — SIGNIFICANT CHANGE UP (ref 3.5–5.3)
POTASSIUM SERPL-SCNC: 4.7 MMOL/L — SIGNIFICANT CHANGE UP (ref 3.5–5.3)
RBC # BLD: 3.98 M/UL — LOW (ref 4.2–5.8)
RBC # FLD: 14.6 % — HIGH (ref 10.3–14.5)
SODIUM SERPL-SCNC: 139 MMOL/L — SIGNIFICANT CHANGE UP (ref 135–145)
WBC # BLD: 5.83 K/UL — SIGNIFICANT CHANGE UP (ref 3.8–10.5)
WBC # FLD AUTO: 5.83 K/UL — SIGNIFICANT CHANGE UP (ref 3.8–10.5)

## 2017-11-19 PROCEDURE — 99233 SBSQ HOSP IP/OBS HIGH 50: CPT

## 2017-11-19 RX ORDER — LANOLIN ALCOHOL/MO/W.PET/CERES
3 CREAM (GRAM) TOPICAL AT BEDTIME
Qty: 0 | Refills: 0 | Status: DISCONTINUED | OUTPATIENT
Start: 2017-11-19 | End: 2017-11-24

## 2017-11-19 RX ADMIN — Medication 1 MILLIGRAM(S): at 18:14

## 2017-11-19 RX ADMIN — Medication 1 MILLIGRAM(S): at 06:54

## 2017-11-19 RX ADMIN — ACETAZOLAMIDE 500 MILLIGRAM(S): 250 TABLET ORAL at 18:13

## 2017-11-19 RX ADMIN — Medication 100 MILLIGRAM(S): at 12:31

## 2017-11-19 RX ADMIN — TAMSULOSIN HYDROCHLORIDE 0.4 MILLIGRAM(S): 0.4 CAPSULE ORAL at 21:38

## 2017-11-19 RX ADMIN — OXYCODONE AND ACETAMINOPHEN 1 TABLET(S): 5; 325 TABLET ORAL at 21:38

## 2017-11-19 RX ADMIN — HEPARIN SODIUM 5000 UNIT(S): 5000 INJECTION INTRAVENOUS; SUBCUTANEOUS at 21:38

## 2017-11-19 RX ADMIN — DIVALPROEX SODIUM 1000 MILLIGRAM(S): 500 TABLET, DELAYED RELEASE ORAL at 06:54

## 2017-11-19 RX ADMIN — ACETAZOLAMIDE 500 MILLIGRAM(S): 250 TABLET ORAL at 06:54

## 2017-11-19 RX ADMIN — BRIMONIDINE TARTRATE 1 DROP(S): 2 SOLUTION/ DROPS OPHTHALMIC at 06:55

## 2017-11-19 RX ADMIN — ARIPIPRAZOLE 20 MILLIGRAM(S): 15 TABLET ORAL at 12:30

## 2017-11-19 RX ADMIN — CITALOPRAM 40 MILLIGRAM(S): 10 TABLET, FILM COATED ORAL at 12:31

## 2017-11-19 RX ADMIN — Medication 1000 UNIT(S): at 12:30

## 2017-11-19 RX ADMIN — Medication 1 DROP(S): at 06:55

## 2017-11-19 RX ADMIN — Medication 1 DROP(S): at 18:14

## 2017-11-19 RX ADMIN — OXYCODONE AND ACETAMINOPHEN 1 TABLET(S): 5; 325 TABLET ORAL at 22:03

## 2017-11-19 RX ADMIN — DIVALPROEX SODIUM 1000 MILLIGRAM(S): 500 TABLET, DELAYED RELEASE ORAL at 18:13

## 2017-11-19 RX ADMIN — HEPARIN SODIUM 5000 UNIT(S): 5000 INJECTION INTRAVENOUS; SUBCUTANEOUS at 12:30

## 2017-11-19 RX ADMIN — POLYETHYLENE GLYCOL 3350 17 GRAM(S): 17 POWDER, FOR SOLUTION ORAL at 12:30

## 2017-11-19 RX ADMIN — BRIMONIDINE TARTRATE 1 DROP(S): 2 SOLUTION/ DROPS OPHTHALMIC at 21:38

## 2017-11-19 RX ADMIN — HEPARIN SODIUM 5000 UNIT(S): 5000 INJECTION INTRAVENOUS; SUBCUTANEOUS at 06:55

## 2017-11-19 RX ADMIN — Medication 100 MILLIGRAM(S): at 06:54

## 2017-11-19 RX ADMIN — BRIMONIDINE TARTRATE 1 DROP(S): 2 SOLUTION/ DROPS OPHTHALMIC at 12:30

## 2017-11-19 NOTE — PROGRESS NOTE ADULT - SUBJECTIVE AND OBJECTIVE BOX
Patient is a 62y old  Male who presents with a chief complaint of Right hip pain (11 Nov 2017 15:31)    SUBJECTIVE / OVERNIGHT EVENTS: This AM, patient feels well. No reported f/c/n/v/d/cough/chest pain/sob.    MEDICATIONS  (STANDING):  acetazolamide   ER Capsule 500 milliGRAM(s) Oral two times a day  ARIPiprazole 20 milliGRAM(s) Oral daily  benztropine 1 milliGRAM(s) Oral two times a day  brimonidine 0.2% Ophthalmic Solution 1 Drop(s) Both EYES three times a day  cholecalciferol 1000 Unit(s) Oral daily  citalopram 40 milliGRAM(s) Oral daily  diVALproex DR 1000 milliGRAM(s) Oral two times a day  docusate sodium 100 milliGRAM(s) Oral three times a day  heparin  Injectable 5000 Unit(s) SubCutaneous every 8 hours  polyethylene glycol 3350 17 Gram(s) Oral daily  senna 2 Tablet(s) Oral at bedtime  tamsulosin 0.4 milliGRAM(s) Oral at bedtime  timolol 0.5% Solution 1 Drop(s) Both EYES two times a day    MEDICATIONS  (PRN):  acetaminophen   Tablet. 650 milliGRAM(s) Oral every 6 hours PRN Mild Pain (1 - 3)  bisacodyl 10 milliGRAM(s) Oral at bedtime PRN Constipation  oxyCODONE    5 mG/acetaminophen 325 mG 1 Tablet(s) Oral every 6 hours PRN moderate to severe pain    Vital Signs Last 24 Hrs  T(C): 36.6 (19 Nov 2017 06:50), Max: 36.6 (19 Nov 2017 06:50)  T(F): 97.9 (19 Nov 2017 06:50), Max: 97.9 (19 Nov 2017 06:50)  HR: 91 (19 Nov 2017 06:50) (89 - 91)  BP: 142/65 (19 Nov 2017 06:50) (120/64 - 142/65)  BP(mean): --  RR: 18 (19 Nov 2017 06:50) (17 - 18)  SpO2: 98% (19 Nov 2017 06:50) (96% - 98%)    CAPILLARY BLOOD GLUCOSE    PHYSICAL EXAM  GENERAL: NAD, wn/wd  HEAD:  Atraumatic, Normocephalic  EYES: EOMI, PERRLA, conjunctiva and sclera clear  CHEST/LUNG: Clear to auscultation bilaterally; No wheeze  HEART: Regular rate and rhythm; No murmurs, rubs, or gallops  ABDOMEN: Soft, Nontender, Nondistended; Bowel sounds present  EXTREMITIES:  2+ Peripheral Pulses, No clubbing, cyanosis, or edema      LABS:                     11-19    139  |  106  |  10  ----------------------------<  83  4.7   |  24  |  0.68    Ca    8.0<L>      19 Nov 2017 07:10  Mg     2.0     11-19                        13.5   5.83  )-----------( 125      ( 19 Nov 2017 07:10 )             40.3         RADIOLOGY & ADDITIONAL TESTS:    Imaging Personally Reviewed:  Consultant(s) Notes Reviewed:    Care Discussed with Consultants/Other Providers:

## 2017-11-20 DIAGNOSIS — M25.551 PAIN IN RIGHT HIP: ICD-10-CM

## 2017-11-20 PROCEDURE — 99233 SBSQ HOSP IP/OBS HIGH 50: CPT

## 2017-11-20 RX ADMIN — DIVALPROEX SODIUM 1000 MILLIGRAM(S): 500 TABLET, DELAYED RELEASE ORAL at 05:41

## 2017-11-20 RX ADMIN — Medication 1 MILLIGRAM(S): at 17:24

## 2017-11-20 RX ADMIN — Medication 100 MILLIGRAM(S): at 13:49

## 2017-11-20 RX ADMIN — HEPARIN SODIUM 5000 UNIT(S): 5000 INJECTION INTRAVENOUS; SUBCUTANEOUS at 13:48

## 2017-11-20 RX ADMIN — Medication 100 MILLIGRAM(S): at 05:41

## 2017-11-20 RX ADMIN — SENNA PLUS 2 TABLET(S): 8.6 TABLET ORAL at 22:32

## 2017-11-20 RX ADMIN — HEPARIN SODIUM 5000 UNIT(S): 5000 INJECTION INTRAVENOUS; SUBCUTANEOUS at 22:32

## 2017-11-20 RX ADMIN — TAMSULOSIN HYDROCHLORIDE 0.4 MILLIGRAM(S): 0.4 CAPSULE ORAL at 22:32

## 2017-11-20 RX ADMIN — Medication 3 MILLIGRAM(S): at 22:32

## 2017-11-20 RX ADMIN — CITALOPRAM 40 MILLIGRAM(S): 10 TABLET, FILM COATED ORAL at 11:43

## 2017-11-20 RX ADMIN — BRIMONIDINE TARTRATE 1 DROP(S): 2 SOLUTION/ DROPS OPHTHALMIC at 05:41

## 2017-11-20 RX ADMIN — OXYCODONE AND ACETAMINOPHEN 1 TABLET(S): 5; 325 TABLET ORAL at 17:39

## 2017-11-20 RX ADMIN — ACETAZOLAMIDE 500 MILLIGRAM(S): 250 TABLET ORAL at 05:41

## 2017-11-20 RX ADMIN — POLYETHYLENE GLYCOL 3350 17 GRAM(S): 17 POWDER, FOR SOLUTION ORAL at 11:43

## 2017-11-20 RX ADMIN — OXYCODONE AND ACETAMINOPHEN 1 TABLET(S): 5; 325 TABLET ORAL at 18:15

## 2017-11-20 RX ADMIN — DIVALPROEX SODIUM 1000 MILLIGRAM(S): 500 TABLET, DELAYED RELEASE ORAL at 17:24

## 2017-11-20 RX ADMIN — BRIMONIDINE TARTRATE 1 DROP(S): 2 SOLUTION/ DROPS OPHTHALMIC at 22:33

## 2017-11-20 RX ADMIN — Medication 1000 UNIT(S): at 11:43

## 2017-11-20 RX ADMIN — HEPARIN SODIUM 5000 UNIT(S): 5000 INJECTION INTRAVENOUS; SUBCUTANEOUS at 05:41

## 2017-11-20 RX ADMIN — Medication 1 DROP(S): at 17:24

## 2017-11-20 RX ADMIN — Medication 1 DROP(S): at 05:41

## 2017-11-20 RX ADMIN — ACETAZOLAMIDE 500 MILLIGRAM(S): 250 TABLET ORAL at 17:24

## 2017-11-20 RX ADMIN — Medication 100 MILLIGRAM(S): at 22:33

## 2017-11-20 RX ADMIN — ARIPIPRAZOLE 20 MILLIGRAM(S): 15 TABLET ORAL at 11:43

## 2017-11-20 RX ADMIN — BRIMONIDINE TARTRATE 1 DROP(S): 2 SOLUTION/ DROPS OPHTHALMIC at 13:48

## 2017-11-20 RX ADMIN — Medication 1 MILLIGRAM(S): at 05:41

## 2017-11-20 NOTE — PROGRESS NOTE ADULT - SUBJECTIVE AND OBJECTIVE BOX
Patient is a 62y old  Male who presents with a chief complaint of Right hip pain (11 Nov 2017 15:31)      INTERVAL HPI/OVERNIGHT EVENTS:    MEDICATIONS  (STANDING):  acetazolamide   ER Capsule 500 milliGRAM(s) Oral two times a day  ARIPiprazole 20 milliGRAM(s) Oral daily  benztropine 1 milliGRAM(s) Oral two times a day  brimonidine 0.2% Ophthalmic Solution 1 Drop(s) Both EYES three times a day  cholecalciferol 1000 Unit(s) Oral daily  citalopram 40 milliGRAM(s) Oral daily  diVALproex DR 1000 milliGRAM(s) Oral two times a day  docusate sodium 100 milliGRAM(s) Oral three times a day  heparin  Injectable 5000 Unit(s) SubCutaneous every 8 hours  melatonin 3 milliGRAM(s) Oral at bedtime  polyethylene glycol 3350 17 Gram(s) Oral daily  senna 2 Tablet(s) Oral at bedtime  tamsulosin 0.4 milliGRAM(s) Oral at bedtime  timolol 0.5% Solution 1 Drop(s) Both EYES two times a day    MEDICATIONS  (PRN):  acetaminophen   Tablet. 650 milliGRAM(s) Oral every 6 hours PRN Mild Pain (1 - 3)  bisacodyl 10 milliGRAM(s) Oral at bedtime PRN Constipation  oxyCODONE    5 mG/acetaminophen 325 mG 1 Tablet(s) Oral every 6 hours PRN moderate to severe pain      Allergies    No Known Allergies    Intolerances        REVIEW OF SYSTEMS:  Please see interval HPI:    Vital Signs Last 24 Hrs  T(C): 36.6 (20 Nov 2017 04:34), Max: 36.6 (20 Nov 2017 04:34)  T(F): 97.8 (20 Nov 2017 04:34), Max: 97.8 (20 Nov 2017 04:34)  HR: 57 (20 Nov 2017 04:34) (57 - 62)  BP: 105/65 (20 Nov 2017 04:34) (105/65 - 127/67)  BP(mean): --  RR: 17 (20 Nov 2017 04:34) (17 - 18)  SpO2: 100% (20 Nov 2017 04:34) (98% - 100%)  I&O's Detail        PHYSICAL EXAM:  GENERAL:   HEAD:    EYES:   ENMT:   NECK:   NERVOUS SYSTEM:    CHEST/LUNG:   HEART:   ABDOMEN:   EXTREMITIES:    LYMPH:   SKIN:     LABS:      Ca    8.0        19 Nov 2017 07:10        CAPILLARY BLOOD GLUCOSE        BLOOD CULTURE    RADIOLOGY & ADDITIONAL TESTS:    Imaging Personally Reviewed:  [ ] YES     Consultant(s) Notes Reviewed:      Care Discussed with Consultants/Other Providers: Patient is a 62y old  Male who presents with a chief complaint of Right hip pain (11 Nov 2017 15:31)    INTERVAL HPI/OVERNIGHT EVENTS:  Denies issues with urination. Complaining of some left leg pain (vague/unable to describe further). No f/c.     MEDICATIONS  (STANDING):  acetazolamide   ER Capsule 500 milliGRAM(s) Oral two times a day  ARIPiprazole 20 milliGRAM(s) Oral daily  benztropine 1 milliGRAM(s) Oral two times a day  brimonidine 0.2% Ophthalmic Solution 1 Drop(s) Both EYES three times a day  cholecalciferol 1000 Unit(s) Oral daily  citalopram 40 milliGRAM(s) Oral daily  diVALproex DR 1000 milliGRAM(s) Oral two times a day  docusate sodium 100 milliGRAM(s) Oral three times a day  heparin  Injectable 5000 Unit(s) SubCutaneous every 8 hours  melatonin 3 milliGRAM(s) Oral at bedtime  polyethylene glycol 3350 17 Gram(s) Oral daily  senna 2 Tablet(s) Oral at bedtime  tamsulosin 0.4 milliGRAM(s) Oral at bedtime  timolol 0.5% Solution 1 Drop(s) Both EYES two times a day    MEDICATIONS  (PRN):  acetaminophen   Tablet. 650 milliGRAM(s) Oral every 6 hours PRN Mild Pain (1 - 3)  bisacodyl 10 milliGRAM(s) Oral at bedtime PRN Constipation  oxyCODONE    5 mG/acetaminophen 325 mG 1 Tablet(s) Oral every 6 hours PRN moderate to severe pain    Allergies  No Known Allergies    Intolerances    REVIEW OF SYSTEMS:  Please see interval HPI:    Vital Signs Last 24 Hrs  T(C): 36.6 (20 Nov 2017 04:34), Max: 36.6 (20 Nov 2017 04:34)  T(F): 97.8 (20 Nov 2017 04:34), Max: 97.8 (20 Nov 2017 04:34)  HR: 57 (20 Nov 2017 04:34) (57 - 62)  BP: 105/65 (20 Nov 2017 04:34) (105/65 - 127/67)  BP(mean): --  RR: 17 (20 Nov 2017 04:34) (17 - 18)  SpO2: 100% (20 Nov 2017 04:34) (98% - 100%)  I&O's Detail    PHYSICAL EXAM:  GENERAL: NAD, arousable to voice.   HEAD:  NC/AT  EYES: EOMI, clear sclera and conjunctiva  ENMT: MMM  NECK: supple  NERVOUS SYSTEM: has tremor    CHEST/LUNG: CTAB, comfortable on RA  HEART: S1S2 RRR  ABDOMEN: soft, nontender  EXTREMITIES:  no c/c/e, heel protectors in place      LABS:      Ca    8.0        19 Nov 2017 07:10        CAPILLARY BLOOD GLUCOSE        BLOOD CULTURE    RADIOLOGY & ADDITIONAL TESTS:    Imaging Personally Reviewed:  [ ] YES     Consultant(s) Notes Reviewed:      Care Discussed with Consultants/Other Providers:

## 2017-11-20 NOTE — DIETITIAN INITIAL EVALUATION ADULT. - NS AS NUTRI INTERV MEALS SNACK
Other (specify)/1. Suggest: PO diet rx: Dysphagia 2 Mechanical Soft - Thin Liquids; PO supplement: Ensure Enlive 8oz. 2x daily (will provide additional ~700 Kcal, ~40 gm Protein);             2. Encourage & assist Pt with meals; Monitor PO diet tolerance;           3. Monitor labs, weights, hydration status;           4. Suggest: Swallow Bedside Assessment Adult &/or MBS if needed (to determine appropriate PO diet consistency);/Texture-modified diet

## 2017-11-20 NOTE — DIETITIAN INITIAL EVALUATION ADULT. - OTHER INFO
Pt seen for length of stay. Pt with PMH of mild intellectual disability, mood disorder, psychotic disorder, PTSD, anxiety, Glaucoma presented from a group home with right hip pain. Pt awake, appears confused. Per nurse Pt's appetite not good; Pt needs help with meals. Pt partially edentulous noted. Pt's diet order Dysphagia 3 Soft - Thin Liquids. Per nurse Pt needs softer food items than Dysphagia 3 consistency. Spoke to ADS (NP). Noted PO diet order down graded to Dysphagia 2 Mechanical Soft. No report of nausea/vomiting/diarrhea @ present. RDN remains available, nurse made aware.

## 2017-11-20 NOTE — DIETITIAN INITIAL EVALUATION ADULT. - PROBLEM SELECTOR PLAN 1
- Patient presents to the ED from his group home with complaints of right hip pain and inability to bear weight. Exam was limited; however, there was no exquisite point tenderness on exam and no ecchymosis, warmth, or erythema were appreciated. There was pain elicited with R hip flexion and flexion/extension of R knee. Labs were only significant for an elevated CRP (76.8), but ESR was WNL (2). Prelim read on X-ray showed chronic appearing lesser trochanter fracture is noted with severe R knee joint arthrosis. Patient evaluated by Ortho in the ED who had a low concern for septic hip and who recommended no acute surgical intervention. Unclear if patient's pain is 2/2 severe OA vs prior fracture vs ? myositis.  - Will follow-up final X-ray report. If equivocal, will consider further imaging  - Will check a CK to r/o possible myositis  - Tylenol PRN for pain  - PT consult placed

## 2017-11-21 PROCEDURE — 99233 SBSQ HOSP IP/OBS HIGH 50: CPT

## 2017-11-21 RX ADMIN — Medication 1000 UNIT(S): at 12:00

## 2017-11-21 RX ADMIN — BRIMONIDINE TARTRATE 1 DROP(S): 2 SOLUTION/ DROPS OPHTHALMIC at 05:48

## 2017-11-21 RX ADMIN — Medication 100 MILLIGRAM(S): at 23:11

## 2017-11-21 RX ADMIN — POLYETHYLENE GLYCOL 3350 17 GRAM(S): 17 POWDER, FOR SOLUTION ORAL at 12:00

## 2017-11-21 RX ADMIN — Medication 100 MILLIGRAM(S): at 05:48

## 2017-11-21 RX ADMIN — BRIMONIDINE TARTRATE 1 DROP(S): 2 SOLUTION/ DROPS OPHTHALMIC at 14:17

## 2017-11-21 RX ADMIN — ACETAZOLAMIDE 500 MILLIGRAM(S): 250 TABLET ORAL at 05:48

## 2017-11-21 RX ADMIN — CITALOPRAM 40 MILLIGRAM(S): 10 TABLET, FILM COATED ORAL at 14:15

## 2017-11-21 RX ADMIN — OXYCODONE AND ACETAMINOPHEN 1 TABLET(S): 5; 325 TABLET ORAL at 12:08

## 2017-11-21 RX ADMIN — DIVALPROEX SODIUM 1000 MILLIGRAM(S): 500 TABLET, DELAYED RELEASE ORAL at 18:16

## 2017-11-21 RX ADMIN — HEPARIN SODIUM 5000 UNIT(S): 5000 INJECTION INTRAVENOUS; SUBCUTANEOUS at 14:18

## 2017-11-21 RX ADMIN — DIVALPROEX SODIUM 1000 MILLIGRAM(S): 500 TABLET, DELAYED RELEASE ORAL at 05:48

## 2017-11-21 RX ADMIN — Medication 3 MILLIGRAM(S): at 23:11

## 2017-11-21 RX ADMIN — TAMSULOSIN HYDROCHLORIDE 0.4 MILLIGRAM(S): 0.4 CAPSULE ORAL at 23:11

## 2017-11-21 RX ADMIN — BRIMONIDINE TARTRATE 1 DROP(S): 2 SOLUTION/ DROPS OPHTHALMIC at 23:11

## 2017-11-21 RX ADMIN — Medication 1 DROP(S): at 05:48

## 2017-11-21 RX ADMIN — Medication 1 MILLIGRAM(S): at 18:16

## 2017-11-21 RX ADMIN — ACETAZOLAMIDE 500 MILLIGRAM(S): 250 TABLET ORAL at 18:15

## 2017-11-21 RX ADMIN — Medication 1 MILLIGRAM(S): at 05:48

## 2017-11-21 RX ADMIN — SENNA PLUS 2 TABLET(S): 8.6 TABLET ORAL at 23:11

## 2017-11-21 RX ADMIN — OXYCODONE AND ACETAMINOPHEN 1 TABLET(S): 5; 325 TABLET ORAL at 12:38

## 2017-11-21 RX ADMIN — HEPARIN SODIUM 5000 UNIT(S): 5000 INJECTION INTRAVENOUS; SUBCUTANEOUS at 05:49

## 2017-11-21 RX ADMIN — Medication 1 DROP(S): at 18:16

## 2017-11-21 RX ADMIN — Medication 100 MILLIGRAM(S): at 12:00

## 2017-11-21 RX ADMIN — ARIPIPRAZOLE 20 MILLIGRAM(S): 15 TABLET ORAL at 14:15

## 2017-11-21 RX ADMIN — HEPARIN SODIUM 5000 UNIT(S): 5000 INJECTION INTRAVENOUS; SUBCUTANEOUS at 23:11

## 2017-11-21 NOTE — PROGRESS NOTE ADULT - SUBJECTIVE AND OBJECTIVE BOX
Patient is a 62y old  Male who presents with a chief complaint of Right hip pain (11 Nov 2017 15:31)    INTERVAL HPI/OVERNIGHT EVENTS:  Still complaining of chronic b/l hip/leg pain (unchanged). States pain is making him anxious. Wants to know when can get out of hospital. Thinking about Thanksgiving and turkey.     MEDICATIONS  (STANDING):  acetazolamide   ER Capsule 500 milliGRAM(s) Oral two times a day  ARIPiprazole 20 milliGRAM(s) Oral daily  benztropine 1 milliGRAM(s) Oral two times a day  brimonidine 0.2% Ophthalmic Solution 1 Drop(s) Both EYES three times a day  cholecalciferol 1000 Unit(s) Oral daily  citalopram 40 milliGRAM(s) Oral daily  diVALproex DR 1000 milliGRAM(s) Oral two times a day  docusate sodium 100 milliGRAM(s) Oral three times a day  heparin  Injectable 5000 Unit(s) SubCutaneous every 8 hours  melatonin 3 milliGRAM(s) Oral at bedtime  polyethylene glycol 3350 17 Gram(s) Oral daily  senna 2 Tablet(s) Oral at bedtime  tamsulosin 0.4 milliGRAM(s) Oral at bedtime  timolol 0.5% Solution 1 Drop(s) Both EYES two times a day    MEDICATIONS  (PRN):  acetaminophen   Tablet. 650 milliGRAM(s) Oral every 6 hours PRN Mild Pain (1 - 3)  bisacodyl 10 milliGRAM(s) Oral at bedtime PRN Constipation  oxyCODONE    5 mG/acetaminophen 325 mG 1 Tablet(s) Oral every 6 hours PRN moderate to severe pain    Allergies  No Known Allergies    Intolerances    REVIEW OF SYSTEMS:  Please see interval HPI:    Vital Signs Last 24 Hrs  T(C): 36.4 (21 Nov 2017 05:31), Max: 36.8 (20 Nov 2017 14:06)  T(F): 97.6 (21 Nov 2017 05:31), Max: 98.3 (20 Nov 2017 14:06)  HR: 62 (21 Nov 2017 05:31) (60 - 62)  BP: 102/54 (21 Nov 2017 05:31) (102/54 - 120/68)  BP(mean): --  RR: 18 (21 Nov 2017 05:31) (17 - 18)  SpO2: 96% (21 Nov 2017 05:31) (96% - 100%)  I&O's Detail    21 Nov 2017 07:01  -  21 Nov 2017 12:13  --------------------------------------------------------  IN:    Oral Fluid: 100 mL  Total IN: 100 mL    OUT:  Total OUT: 0 mL  Total NET: 100 mL    PHYSICAL EXAM:  GENERAL: NAD, awake alert, called out greeting to provider as I was coming down the hallway to his room, gave fist bump  HEAD:  NC/AT  EYES: EOMI, clear sclera and conjunctiva  ENMT: MMM, poor dentition, missing teeth  NECK: supple  NERVOUS SYSTEM/PSYCH: has tremor, appears anxious    CHEST/LUNG: CTAB, comfortable on RA  HEART: S1S2 RRR  ABDOMEN: soft, nontender  EXTREMITIES:  no c/c/e, heel protectors in place    LABS:    CAPILLARY BLOOD GLUCOSE    BLOOD CULTURE    RADIOLOGY & ADDITIONAL TESTS:    Imaging Personally Reviewed:  [ ] YES     Consultant(s) Notes Reviewed:      Care Discussed with Consultants/Other Providers:

## 2017-11-22 PROCEDURE — 99233 SBSQ HOSP IP/OBS HIGH 50: CPT

## 2017-11-22 RX ORDER — OXYCODONE AND ACETAMINOPHEN 5; 325 MG/1; MG/1
1 TABLET ORAL EVERY 6 HOURS
Qty: 0 | Refills: 0 | Status: DISCONTINUED | OUTPATIENT
Start: 2017-11-22 | End: 2017-11-24

## 2017-11-22 RX ADMIN — Medication 100 MILLIGRAM(S): at 06:32

## 2017-11-22 RX ADMIN — POLYETHYLENE GLYCOL 3350 17 GRAM(S): 17 POWDER, FOR SOLUTION ORAL at 12:30

## 2017-11-22 RX ADMIN — OXYCODONE AND ACETAMINOPHEN 1 TABLET(S): 5; 325 TABLET ORAL at 23:39

## 2017-11-22 RX ADMIN — DIVALPROEX SODIUM 1000 MILLIGRAM(S): 500 TABLET, DELAYED RELEASE ORAL at 06:32

## 2017-11-22 RX ADMIN — ACETAZOLAMIDE 500 MILLIGRAM(S): 250 TABLET ORAL at 17:22

## 2017-11-22 RX ADMIN — Medication 3 MILLIGRAM(S): at 23:39

## 2017-11-22 RX ADMIN — Medication 100 MILLIGRAM(S): at 14:47

## 2017-11-22 RX ADMIN — DIVALPROEX SODIUM 1000 MILLIGRAM(S): 500 TABLET, DELAYED RELEASE ORAL at 17:21

## 2017-11-22 RX ADMIN — TAMSULOSIN HYDROCHLORIDE 0.4 MILLIGRAM(S): 0.4 CAPSULE ORAL at 23:39

## 2017-11-22 RX ADMIN — HEPARIN SODIUM 5000 UNIT(S): 5000 INJECTION INTRAVENOUS; SUBCUTANEOUS at 06:32

## 2017-11-22 RX ADMIN — Medication 1000 UNIT(S): at 12:30

## 2017-11-22 RX ADMIN — HEPARIN SODIUM 5000 UNIT(S): 5000 INJECTION INTRAVENOUS; SUBCUTANEOUS at 14:47

## 2017-11-22 RX ADMIN — BRIMONIDINE TARTRATE 1 DROP(S): 2 SOLUTION/ DROPS OPHTHALMIC at 06:33

## 2017-11-22 RX ADMIN — Medication 1 DROP(S): at 06:33

## 2017-11-22 RX ADMIN — HEPARIN SODIUM 5000 UNIT(S): 5000 INJECTION INTRAVENOUS; SUBCUTANEOUS at 23:38

## 2017-11-22 RX ADMIN — ACETAZOLAMIDE 500 MILLIGRAM(S): 250 TABLET ORAL at 06:32

## 2017-11-22 RX ADMIN — Medication 1 MILLIGRAM(S): at 06:32

## 2017-11-22 RX ADMIN — CITALOPRAM 40 MILLIGRAM(S): 10 TABLET, FILM COATED ORAL at 12:30

## 2017-11-22 RX ADMIN — BRIMONIDINE TARTRATE 1 DROP(S): 2 SOLUTION/ DROPS OPHTHALMIC at 14:47

## 2017-11-22 RX ADMIN — BRIMONIDINE TARTRATE 1 DROP(S): 2 SOLUTION/ DROPS OPHTHALMIC at 23:39

## 2017-11-22 RX ADMIN — ARIPIPRAZOLE 20 MILLIGRAM(S): 15 TABLET ORAL at 12:29

## 2017-11-22 RX ADMIN — Medication 1 MILLIGRAM(S): at 17:21

## 2017-11-22 RX ADMIN — Medication 1 DROP(S): at 17:19

## 2017-11-22 NOTE — PROGRESS NOTE ADULT - SUBJECTIVE AND OBJECTIVE BOX
Patient is a 62y old  Male who presents with a chief complaint of Right hip pain (11 Nov 2017 15:31)      INTERVAL HPI/OVERNIGHT EVENTS:  Seen and examined earlier today. Being fed breakfast, doing OK, still with chronic pain.     MEDICATIONS  (STANDING):  acetazolamide   ER Capsule 500 milliGRAM(s) Oral two times a day  ARIPiprazole 20 milliGRAM(s) Oral daily  benztropine 1 milliGRAM(s) Oral two times a day  brimonidine 0.2% Ophthalmic Solution 1 Drop(s) Both EYES three times a day  cholecalciferol 1000 Unit(s) Oral daily  citalopram 40 milliGRAM(s) Oral daily  diVALproex DR 1000 milliGRAM(s) Oral two times a day  docusate sodium 100 milliGRAM(s) Oral three times a day  heparin  Injectable 5000 Unit(s) SubCutaneous every 8 hours  melatonin 3 milliGRAM(s) Oral at bedtime  polyethylene glycol 3350 17 Gram(s) Oral daily  senna 2 Tablet(s) Oral at bedtime  tamsulosin 0.4 milliGRAM(s) Oral at bedtime  timolol 0.5% Solution 1 Drop(s) Both EYES two times a day    MEDICATIONS  (PRN):  acetaminophen   Tablet. 650 milliGRAM(s) Oral every 6 hours PRN Mild Pain (1 - 3)  bisacodyl 10 milliGRAM(s) Oral at bedtime PRN Constipation  oxyCODONE    5 mG/acetaminophen 325 mG 1 Tablet(s) Oral every 6 hours PRN moderate to severe pain      Allergies  No Known Allergies    Intolerances    REVIEW OF SYSTEMS:  Please see interval HPI:    Vital Signs Last 24 Hrs  T(C): 36.7 (22 Nov 2017 05:34), Max: 36.8 (21 Nov 2017 14:18)  T(F): 98 (22 Nov 2017 05:34), Max: 98.2 (21 Nov 2017 14:18)  HR: 72 (22 Nov 2017 05:34) (70 - 72)  BP: 102/53 (22 Nov 2017 05:34) (95/57 - 116/89)  BP(mean): --  RR: 17 (22 Nov 2017 05:34) (17 - 18)  SpO2: 97% (22 Nov 2017 05:34) (94% - 97%)  I&O's Detail    21 Nov 2017 07:01  -  22 Nov 2017 07:00  --------------------------------------------------------  IN:    Oral Fluid: 100 mL  Total IN: 100 mL    OUT:  Total OUT: 0 mL    Total NET: 100 mL    PHYSICAL EXAM:  GENERAL: NAD, awake alert, eating breakfast  HEAD:  NC/AT  EYES: EOMI, clear sclera and conjunctiva  ENMT: MMM, poor dentition, missing teeth  NECK: supple  NERVOUS SYSTEM/PSYCH: appears anxious, jumpy when trying to touch LE    CHEST/LUNG: CTAB, comfortable on RA  HEART: S1S2 RRR  ABDOMEN: soft, nontender  EXTREMITIES:  no c/c/e, heel protectors in place, diffusely tender to palpation    LABS:      CAPILLARY BLOOD GLUCOSE      BLOOD CULTURE    RADIOLOGY & ADDITIONAL TESTS:    Imaging Personally Reviewed:  [ ] YES     Consultant(s) Notes Reviewed:      Care Discussed with Consultants/Other Providers:

## 2017-11-23 PROCEDURE — 99233 SBSQ HOSP IP/OBS HIGH 50: CPT

## 2017-11-23 RX ADMIN — Medication 1 MILLIGRAM(S): at 17:45

## 2017-11-23 RX ADMIN — ACETAZOLAMIDE 500 MILLIGRAM(S): 250 TABLET ORAL at 17:45

## 2017-11-23 RX ADMIN — HEPARIN SODIUM 5000 UNIT(S): 5000 INJECTION INTRAVENOUS; SUBCUTANEOUS at 06:24

## 2017-11-23 RX ADMIN — BRIMONIDINE TARTRATE 1 DROP(S): 2 SOLUTION/ DROPS OPHTHALMIC at 06:25

## 2017-11-23 RX ADMIN — BRIMONIDINE TARTRATE 1 DROP(S): 2 SOLUTION/ DROPS OPHTHALMIC at 14:56

## 2017-11-23 RX ADMIN — BRIMONIDINE TARTRATE 1 DROP(S): 2 SOLUTION/ DROPS OPHTHALMIC at 21:54

## 2017-11-23 RX ADMIN — TAMSULOSIN HYDROCHLORIDE 0.4 MILLIGRAM(S): 0.4 CAPSULE ORAL at 21:55

## 2017-11-23 RX ADMIN — HEPARIN SODIUM 5000 UNIT(S): 5000 INJECTION INTRAVENOUS; SUBCUTANEOUS at 14:56

## 2017-11-23 RX ADMIN — Medication 1000 UNIT(S): at 14:56

## 2017-11-23 RX ADMIN — ACETAZOLAMIDE 500 MILLIGRAM(S): 250 TABLET ORAL at 06:24

## 2017-11-23 RX ADMIN — HEPARIN SODIUM 5000 UNIT(S): 5000 INJECTION INTRAVENOUS; SUBCUTANEOUS at 21:54

## 2017-11-23 RX ADMIN — DIVALPROEX SODIUM 1000 MILLIGRAM(S): 500 TABLET, DELAYED RELEASE ORAL at 06:25

## 2017-11-23 RX ADMIN — OXYCODONE AND ACETAMINOPHEN 1 TABLET(S): 5; 325 TABLET ORAL at 00:09

## 2017-11-23 RX ADMIN — CITALOPRAM 40 MILLIGRAM(S): 10 TABLET, FILM COATED ORAL at 14:56

## 2017-11-23 RX ADMIN — Medication 3 MILLIGRAM(S): at 21:54

## 2017-11-23 RX ADMIN — ARIPIPRAZOLE 20 MILLIGRAM(S): 15 TABLET ORAL at 14:55

## 2017-11-23 RX ADMIN — DIVALPROEX SODIUM 1000 MILLIGRAM(S): 500 TABLET, DELAYED RELEASE ORAL at 17:45

## 2017-11-23 RX ADMIN — Medication 1 DROP(S): at 17:45

## 2017-11-23 RX ADMIN — Medication 1 DROP(S): at 06:24

## 2017-11-23 RX ADMIN — Medication 1 MILLIGRAM(S): at 06:24

## 2017-11-23 NOTE — PROGRESS NOTE ADULT - SUBJECTIVE AND OBJECTIVE BOX
Patient is a 62y old  Male who presents with a chief complaint of Right hip pain (11 Nov 2017 15:31)    patient seen and examine at bed side   no acute issue    MEDICATIONS  (STANDING):  acetazolamide   ER Capsule 500 milliGRAM(s) Oral two times a day  ARIPiprazole 20 milliGRAM(s) Oral daily  benztropine 1 milliGRAM(s) Oral two times a day  brimonidine 0.2% Ophthalmic Solution 1 Drop(s) Both EYES three times a day  cholecalciferol 1000 Unit(s) Oral daily  citalopram 40 milliGRAM(s) Oral daily  diVALproex DR 1000 milliGRAM(s) Oral two times a day  docusate sodium 100 milliGRAM(s) Oral three times a day  heparin  Injectable 5000 Unit(s) SubCutaneous every 8 hours  melatonin 3 milliGRAM(s) Oral at bedtime  polyethylene glycol 3350 17 Gram(s) Oral daily  senna 2 Tablet(s) Oral at bedtime  tamsulosin 0.4 milliGRAM(s) Oral at bedtime  timolol 0.5% Solution 1 Drop(s) Both EYES two times a day    MEDICATIONS  (PRN):  acetaminophen   Tablet. 650 milliGRAM(s) Oral every 6 hours PRN Mild Pain (1 - 3)  bisacodyl 10 milliGRAM(s) Oral at bedtime PRN Constipation  oxyCODONE    5 mG/acetaminophen 325 mG 1 Tablet(s) Oral every 6 hours PRN moderate to severe pain        Vital Signs Last 24 Hrs  T(C): 37.2 (23 Nov 2017 13:40), Max: 37.2 (23 Nov 2017 13:40)  T(F): 99 (23 Nov 2017 13:40), Max: 99 (23 Nov 2017 13:40)  HR: 63 (23 Nov 2017 13:40) (63 - 68)  BP: 141/79 (23 Nov 2017 13:40) (90/52 - 141/79)  BP(mean): --  RR: 17 (23 Nov 2017 13:40) (16 - 18)  SpO2: 98% (23 Nov 2017 13:40) (98% - 100%)    PHYSICAL EXAM:  GENERAL: NAD, awake alert, eating breakfast  HEAD:  NC/AT  EYES: EOMI, clear sclera and conjunctiva  ENMT: MMM, poor dentition, missing teeth  NECK: supple  NERVOUS SYSTEM/PSYCH: appears anxious, jumpy when trying to touch LE    CHEST/LUNG: CTAB, comfortable on RA  HEART: S1S2 RRR  ABDOMEN: soft, nontender  EXTREMITIES:  no c/c/e, heel protectors in place, diffusely tender to palpation    LABS:    no  labs          BLOOD CULTURE    RADIOLOGY & ADDITIONAL TESTS:    Imaging Personally Reviewed:  [ ] YES     Consultant(s) Notes Reviewed:      Care Discussed with Consultants/Other Providers:

## 2017-11-24 VITALS
DIASTOLIC BLOOD PRESSURE: 77 MMHG | HEART RATE: 68 BPM | TEMPERATURE: 98 F | SYSTOLIC BLOOD PRESSURE: 97 MMHG | OXYGEN SATURATION: 99 % | RESPIRATION RATE: 17 BRPM

## 2017-11-24 PROCEDURE — 99239 HOSP IP/OBS DSCHRG MGMT >30: CPT

## 2017-11-24 RX ORDER — DIVALPROEX SODIUM 500 MG/1
2 TABLET, DELAYED RELEASE ORAL
Qty: 0 | Refills: 0 | COMMUNITY
Start: 2017-11-24

## 2017-11-24 RX ORDER — LANOLIN ALCOHOL/MO/W.PET/CERES
1 CREAM (GRAM) TOPICAL
Qty: 0 | Refills: 0 | COMMUNITY
Start: 2017-11-24

## 2017-11-24 RX ORDER — CHOLECALCIFEROL (VITAMIN D3) 125 MCG
1000 CAPSULE ORAL
Qty: 0 | Refills: 0 | COMMUNITY
Start: 2017-11-24

## 2017-11-24 RX ORDER — TAMSULOSIN HYDROCHLORIDE 0.4 MG/1
1 CAPSULE ORAL
Qty: 0 | Refills: 0 | COMMUNITY
Start: 2017-11-24

## 2017-11-24 RX ORDER — BENZTROPINE MESYLATE 1 MG
1 TABLET ORAL
Qty: 0 | Refills: 0 | COMMUNITY
Start: 2017-11-24

## 2017-11-24 RX ORDER — SENNA PLUS 8.6 MG/1
2 TABLET ORAL
Qty: 0 | Refills: 0 | COMMUNITY

## 2017-11-24 RX ORDER — DOCUSATE SODIUM 100 MG
1 CAPSULE ORAL
Qty: 0 | Refills: 0 | COMMUNITY
Start: 2017-11-24

## 2017-11-24 RX ORDER — CITALOPRAM 10 MG/1
0 TABLET, FILM COATED ORAL
Qty: 0 | Refills: 0 | COMMUNITY

## 2017-11-24 RX ORDER — DIVALPROEX SODIUM 500 MG/1
0 TABLET, DELAYED RELEASE ORAL
Qty: 0 | Refills: 0 | COMMUNITY

## 2017-11-24 RX ORDER — BENZTROPINE MESYLATE 1 MG
0 TABLET ORAL
Qty: 0 | Refills: 0 | COMMUNITY

## 2017-11-24 RX ORDER — POLYETHYLENE GLYCOL 3350 17 G/17G
17 POWDER, FOR SOLUTION ORAL
Qty: 0 | Refills: 0 | COMMUNITY
Start: 2017-11-24

## 2017-11-24 RX ORDER — SENNA PLUS 8.6 MG/1
2 TABLET ORAL
Qty: 0 | Refills: 0 | COMMUNITY
Start: 2017-11-24

## 2017-11-24 RX ORDER — ACETAZOLAMIDE 250 MG/1
1 TABLET ORAL
Qty: 0 | Refills: 0 | COMMUNITY
Start: 2017-11-24

## 2017-11-24 RX ORDER — ARIPIPRAZOLE 15 MG/1
1 TABLET ORAL
Qty: 0 | Refills: 0 | COMMUNITY

## 2017-11-24 RX ORDER — CITALOPRAM 10 MG/1
1 TABLET, FILM COATED ORAL
Qty: 0 | Refills: 0 | COMMUNITY
Start: 2017-11-24

## 2017-11-24 RX ORDER — ACETAZOLAMIDE 250 MG/1
0 TABLET ORAL
Qty: 0 | Refills: 0 | COMMUNITY

## 2017-11-24 RX ORDER — DOCUSATE SODIUM 100 MG
1 CAPSULE ORAL
Qty: 0 | Refills: 0 | COMMUNITY

## 2017-11-24 RX ORDER — ARIPIPRAZOLE 15 MG/1
1 TABLET ORAL
Qty: 0 | Refills: 0 | COMMUNITY
Start: 2017-11-24

## 2017-11-24 RX ADMIN — BRIMONIDINE TARTRATE 1 DROP(S): 2 SOLUTION/ DROPS OPHTHALMIC at 14:11

## 2017-11-24 RX ADMIN — Medication 1 DROP(S): at 05:01

## 2017-11-24 RX ADMIN — Medication 1 MILLIGRAM(S): at 05:01

## 2017-11-24 RX ADMIN — ACETAZOLAMIDE 500 MILLIGRAM(S): 250 TABLET ORAL at 05:01

## 2017-11-24 RX ADMIN — HEPARIN SODIUM 5000 UNIT(S): 5000 INJECTION INTRAVENOUS; SUBCUTANEOUS at 15:11

## 2017-11-24 RX ADMIN — DIVALPROEX SODIUM 1000 MILLIGRAM(S): 500 TABLET, DELAYED RELEASE ORAL at 05:01

## 2017-11-24 RX ADMIN — BRIMONIDINE TARTRATE 1 DROP(S): 2 SOLUTION/ DROPS OPHTHALMIC at 05:01

## 2017-11-24 RX ADMIN — HEPARIN SODIUM 5000 UNIT(S): 5000 INJECTION INTRAVENOUS; SUBCUTANEOUS at 05:01

## 2017-11-24 NOTE — PROGRESS NOTE ADULT - PROBLEM SELECTOR PROBLEM 4
Glaucoma
Constipation
Glaucoma
Mood disorder
Constipation

## 2017-11-24 NOTE — PROGRESS NOTE ADULT - PROBLEM SELECTOR PLAN 5
- Per paperwork from patient's group home, patient has a history of constipation.  - c/w Senna/Colace/Miraalax   -monitor BMS
- c./w timolol and diamox
- Per paperwork from patient's group home, patient has a history of constipation.  - c/w Senna/Colace  -monitor BMS
- Per paperwork from patient's group home, patient has a history of constipation.  - c/w Senna/Colace/Miraalax   -monitor BMS
- c./w timolol and diamox
- c./w timolol and diamox
-C/w ophthalmic medications
- c./w timolol and diamox

## 2017-11-24 NOTE — PROGRESS NOTE ADULT - SUBJECTIVE AND OBJECTIVE BOX
Patient is a 62y old  Male who presents with a chief complaint of Right hip pain (11 Nov 2017 15:31)      INTERVAL HPI/OVERNIGHT EVENTS:  Just finished getting washed up by nurse/PCA. Per d/w nurse, patient did urinate. Resting peacefully. Plan for rehab today.     MEDICATIONS  (STANDING):  acetazolamide   ER Capsule 500 milliGRAM(s) Oral two times a day  ARIPiprazole 20 milliGRAM(s) Oral daily  benztropine 1 milliGRAM(s) Oral two times a day  brimonidine 0.2% Ophthalmic Solution 1 Drop(s) Both EYES three times a day  cholecalciferol 1000 Unit(s) Oral daily  citalopram 40 milliGRAM(s) Oral daily  diVALproex DR 1000 milliGRAM(s) Oral two times a day  docusate sodium 100 milliGRAM(s) Oral three times a day  heparin  Injectable 5000 Unit(s) SubCutaneous every 8 hours  melatonin 3 milliGRAM(s) Oral at bedtime  polyethylene glycol 3350 17 Gram(s) Oral daily  senna 2 Tablet(s) Oral at bedtime  tamsulosin 0.4 milliGRAM(s) Oral at bedtime  timolol 0.5% Solution 1 Drop(s) Both EYES two times a day    MEDICATIONS  (PRN):  acetaminophen   Tablet. 650 milliGRAM(s) Oral every 6 hours PRN Mild Pain (1 - 3)  bisacodyl 10 milliGRAM(s) Oral at bedtime PRN Constipation  oxyCODONE    5 mG/acetaminophen 325 mG 1 Tablet(s) Oral every 6 hours PRN moderate to severe pain    Allergies  No Known Allergies    Intolerances    REVIEW OF SYSTEMS:  Please see interval HPI:    Vital Signs Last 24 Hrs  T(C): 36.6 (24 Nov 2017 04:52), Max: 37.2 (23 Nov 2017 13:40)  T(F): 97.8 (24 Nov 2017 04:52), Max: 99 (23 Nov 2017 13:40)  HR: 68 (24 Nov 2017 04:52) (63 - 68)  BP: 135/98 (24 Nov 2017 04:52) (135/98 - 141/79)  BP(mean): --  RR: 17 (24 Nov 2017 04:52) (17 - 17)  SpO2: 96% (24 Nov 2017 04:52) (96% - 98%)  I&O's Detail    PHYSICAL EXAM:  GENERAL: NAD, lying in bed  HEAD:  NC/AT  EYES: clear sclera and conjunctiva  ENMT: poor dentition, missing teeth  NECK: supple  NERVOUS SYSTEM: resting peacefully   CHEST/LUNG: CTAB, no respiratory distress on RA  HEART: S1S2 RRR  ABDOMEN: soft, nontender  EXTREMITIES:  no c/c/e    LABS:    CAPILLARY BLOOD GLUCOSE    BLOOD CULTURE    RADIOLOGY & ADDITIONAL TESTS:    Imaging Personally Reviewed:  [ ] YES     Consultant(s) Notes Reviewed:      Care Discussed with Consultants/Other Providers:

## 2017-11-24 NOTE — PROVIDER CONTACT NOTE (OTHER) - SITUATION
pt has low intake of oral fluids as per day RN. encouraged to drink - pt refusing at this time. vital signs stable. no episodes of incontinence noted, no urine output noted.

## 2017-11-24 NOTE — PROGRESS NOTE ADULT - PROBLEM SELECTOR PLAN 1
Patient presents to the ED from his group home with complaints of right hip pain and inability to bear weight.  Labs were only significant for an elevated CRP (76.8), but ESR was WNL (2). Patient evaluated by Ortho in the ED who had a low concern for septic hip and recommended no acute surgical intervention for Xray findings.  Pt now w/p CT pelvis on 11/10 for persistent R hip tenderness showing No acute right hip fracture and Sclerotic and cystic change at the left femoral head anterior superiorly suggestive of avascular necrosis.    Patient's pain is likely 2/2 severe OA, degenerative disease vs prior fracture. CK  levels not c/w possible myositis. Currently no signs of infection clinically.   -Ortho called yesterday for CT findings and no intervention recommended at this time   - C/w Tylenol PRN for pain  - Rehab per PT on 11/10
- chronic appearing right lesser trochanteric fracture, severe right knee joint arthrosis  -  old left femoral neck fracture with varus deformity, severe left knee joint arthrosis  - no surgical intervention per ortho  - c/w pain management (has PO percocet prn), pain is chronic  - PT recs rehab, appreciate CM/SW assistance
Pain is controlled with PRN Tylenol. Patient evaluated by Ortho who recommended no acute surgical intervention.   - C/w Tylenol PRN for pain  - Awaiting placement in rehab facility
Pain is controlled with PRN Tylenol. Patient evaluated by Ortho who recommended no acute surgical intervention.   - C/w Tylenol PRN for pain  - Awaiting placement in rehab facility
Pain is controlled with PRN Tylenol. Patient evaluated by Ortho who recommended no acute surgical intervention.   - C/w Tylenol PRN for pain  -Awaiting re-assessment by PT for placement
Patient evaluated by Ortho in the ED who had a low concern for septic hip and recommended no acute surgical intervention for Xray findings.  Pt now w/p CT pelvis on 11/10 for persistent R hip tenderness showing No acute right hip fracture and Sclerotic and cystic change at the left femoral head anterior superiorly suggestive of avascular necrosis.    Patient's pain is likely 2/2 severe OA, degenerative disease vs prior fracture. CK  levels not c/w possible myositis. Currently no signs of infection clinically.   -Ortho called yesterday for CT findings and no intervention recommended at this time   - C/w Tylenol PRN for pain  - Rehab per PT on 11/10
Patient evaluated by Ortho who recommended no acute surgical intervention.   - C/w Tylenol PRN for pain  - Rehab per PT on 11/10
Patient presents to the ED from his group home with complaints of right hip pain and inability to bear weight.  Labs were only significant for an elevated CRP (76.8), but ESR was WNL (2). Prelim read on X-ray showed chronic appearing lesser trochanter fracture is noted with severe R knee joint arthrosis. Patient evaluated by Ortho in the ED who had a low concern for septic hip and who recommended no acute surgical intervention.     Pt still w/ some R hip tenderness, now w/p CT pelvis on 11/10 showing No acute right hip fracture and Sclerotic and cystic change at the left femoral head anterior superiorly suggestive of avascular necrosis.    Patient's pain is likely 2/2 severe OA vs prior fracture. CK  levels not c/w possible myositis. Currently no signs of infection clinically.   - C/w Tylenol PRN for pain  - Rehab per PT on 11/10   - Though CT findings on the L does not explain pts R sided pain, will f/u with ortho in regards to avascular necrosis seen on the L hip joint
- chronic appearing right lesser trochanteric fracture, severe right knee joint arthrosis  -  old femoral neck fracture with varus deformity, severe left knee joint arthrosis  - no surgical intervention per ortho  - c/w pain management  - PT recs rehab, appreciate CM/SW assistance, reaching out to patient's group home to see what status is...

## 2017-11-24 NOTE — PROGRESS NOTE ADULT - PROBLEM SELECTOR PROBLEM 3
Mood disorder
Mood disorder
Hypokalemia
Mood disorder

## 2017-11-24 NOTE — PROGRESS NOTE ADULT - PROBLEM SELECTOR PLAN 2
Patient has been retaining urine and now failed multiple TOV w/  via straight cath. Larry now placed by urology.   Retention possible in setting of pain? +/- constipation.No reported hx of BPH or previous scans in [facility].   -F/u today's urine out. W/ plan to TOV in a couple days.  -C/w bowel regimen. Per nurse pt had a moderate BM today  -c/w pain control   -flomax started yesterday, would continue
passed TOV  - c/w flomax  - nursing reports patient still able to urinate
-Passed TOV.  -C/w bowel regimen and flomax  - f/u
-Passed TOV. Patient is voiding freely.  -C/w bowel regimen and flomax  - f/u
Patient has been retaining urine and now failed multiple TOV w/  via straight cath. Larry now placed by urology.   Retention possible in setting of pain? +/- constipation. No reported hx of BPH or previous scans in [facility].   -F/u today's urine out. W/ plan to TOV in a couple days.  -C/w bowel regimen. Per nurse pt had a moderate BM today  -c/w pain control   -flomax started yesterday, would continue
Patient has been retaining urine and now failed multiple TOV w/ via straight cath. Larry now placed by urology. Retention possible in setting of pain? +/- constipation. F/u TOV  -C/w bowel regimen. Per nurse pt had a moderate BM today  -c/w pain control   -flomax
Patient has been retaining urine and now failed multiple TOV w/ via straight cath. Larry now placed by urology. Retention possible in setting of pain? +/- constipation. Will attempt TOV today.  -C/w bowel regimen. Per nurse pt had a moderate BM today  -c/w pain control   -flomax
Per nurse patient has been retaining urine and now s/p straight cath x2 w/ residuals of ~475 and ~375 respectively. Now with noted blood at penile orifice likely 2/2 trauma.  Retention possible in setting of pain? No reported hx of BPH or previous scans in [facility].   -Will f/u today's urine out. If he continues to retain will likely need to place yo w/ possible urology eval .
passed TOV  - c/w flomax

## 2017-11-24 NOTE — PROGRESS NOTE ADULT - PROBLEM SELECTOR PROBLEM 5
Constipation
Glaucoma
Constipation
Glaucoma

## 2017-11-24 NOTE — PROGRESS NOTE ADULT - PROVIDER SPECIALTY LIST ADULT
Hospitalist
Internal Medicine
Internal Medicine
Hospitalist

## 2017-11-24 NOTE — PROGRESS NOTE ADULT - PROBLEM SELECTOR PLAN 3
- c/w Cogentin 1mg BID, Citalopram 40mg QD, and Depakote DR 1000mg BID
- on abilify, cogentin, celexa, depakote,  - continue current regimen
- c/w Cogentin 1mg BID, Citalopram 40mg QD, and Depakote DR 1000mg BID
- on abilify, cogentin, celexa, depakote,  - continue current regimen
- on abilify, cogentin, celexa, depakote,  - continue current regimen
Hypokalemia has resolved.
Will give KCl 40mEq x2. Repeat chemistry and check Mg in AM.
Will give KCl 40mEq x2. Repeat chemistry in AM.
- on abilify, cogentin, celexa, depakote,  - appears euthymic

## 2017-11-24 NOTE — PROGRESS NOTE ADULT - PROBLEM SELECTOR PROBLEM 2
R/O Urinary retention

## 2017-11-24 NOTE — PROGRESS NOTE ADULT - PROBLEM SELECTOR PROBLEM 1
Hip pain, bilateral
Hip pain, right
Hip pain, bilateral
Hip pain, right

## 2017-11-24 NOTE — PROGRESS NOTE ADULT - PROBLEM SELECTOR PROBLEM 6
Need for prophylactic measure
Need for prophylactic measure
Constipation
Need for prophylactic measure

## 2017-11-24 NOTE — PROGRESS NOTE ADULT - ATTENDING COMMENTS
Dispo: Appreciate CM/SW assistance, patient was evaluated by PT, recommendation is for rehabilitation facility to improve ability to ambulate with goal of being able to return to group home safely.     Per care coordination, transportation to rehab arranged for this afternoon.     Discharge time: 35 minutes
Dispo: Appreciate CM/SW assistance, patient was evaluated by PT, recommendation is for rehabilitation facility to improve ability to ambulate with goal of being able to return to group home safely. Per care coordination documentation, has accepted bed at [facility], Level II pending, given holiday, case to be completed on Friday...
Dispo: Appreciate CM/SW assistance, per care coordination documentation, group home aware of and in agreement with [facility], authorized CM/SW to send blanket referral to facilities in [place]. Awaiting placement
Time on D/C plannign 35 minutes.
Time planning D/C 35 minutes.
Dispo: rehab vs return to group home with services

## 2017-11-24 NOTE — PROGRESS NOTE ADULT - PROBLEM SELECTOR PLAN 4
-C/w opthalmic medications
- c/w bowel regimen
-C/w opthalmic medications
Patient is clinically euthymic.  - c/w Cogentin 1mg BID, Citalopram 40mg QD, and Depakote DR 1000mg BID
- c/w bowel regimen
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
## 2017-12-13 PROBLEM — Z00.00 ENCOUNTER FOR PREVENTIVE HEALTH EXAMINATION: Status: ACTIVE | Noted: 2017-12-13

## 2017-12-14 ENCOUNTER — APPOINTMENT (OUTPATIENT)
Dept: ORTHOPEDIC SURGERY | Facility: CLINIC | Age: 62
End: 2017-12-14
Payer: MEDICARE

## 2017-12-14 VITALS — HEART RATE: 72 BPM | WEIGHT: 137 LBS | SYSTOLIC BLOOD PRESSURE: 125 MMHG | DIASTOLIC BLOOD PRESSURE: 80 MMHG

## 2017-12-14 PROCEDURE — 99204 OFFICE O/P NEW MOD 45 MIN: CPT

## 2017-12-14 RX ORDER — ARIPIPRAZOLE 2 MG/1
TABLET ORAL
Refills: 0 | Status: ACTIVE | COMMUNITY

## 2017-12-14 RX ORDER — MULTIVITAMIN
TABLET ORAL
Refills: 0 | Status: ACTIVE | COMMUNITY

## 2017-12-14 RX ORDER — BISOPROLOL FUMARATE AND HYDROCHLOROTHIAZIDE 2.5; 6.25 MG/1; MG/1
TABLET, FILM COATED ORAL
Refills: 0 | Status: ACTIVE | COMMUNITY

## 2017-12-14 RX ORDER — CHLORHEXIDINE GLUCONATE 4 %
LIQUID (ML) TOPICAL
Refills: 0 | Status: ACTIVE | COMMUNITY

## 2017-12-14 RX ORDER — BISACODYL 10 MG/1
SUPPOSITORY RECTAL
Refills: 0 | Status: ACTIVE | COMMUNITY

## 2017-12-14 RX ORDER — SENNOSIDES 8.6 MG TABLETS 8.6 MG/1
TABLET ORAL
Refills: 0 | Status: ACTIVE | COMMUNITY

## 2017-12-14 RX ORDER — BENZTROPINE MESYLATE 1 MG/ML
INJECTION INTRAMUSCULAR; INTRAVENOUS
Refills: 0 | Status: ACTIVE | COMMUNITY

## 2017-12-14 RX ORDER — ACETAZOLAMIDE 250 MG/1
TABLET ORAL
Refills: 0 | Status: ACTIVE | COMMUNITY

## 2017-12-14 RX ORDER — CITALOPRAM 10 MG/1
TABLET, FILM COATED ORAL
Refills: 0 | Status: ACTIVE | COMMUNITY

## 2017-12-14 RX ORDER — BRIMONIDINE TARTRATE 1 MG/ML
SOLUTION/ DROPS OPHTHALMIC
Refills: 0 | Status: ACTIVE | COMMUNITY

## 2017-12-14 RX ORDER — LATANOPROST 50 UG/ML
SOLUTION OPHTHALMIC
Refills: 0 | Status: ACTIVE | COMMUNITY

## 2017-12-14 RX ORDER — OXYCODONE HYDROCHLORIDE AND ACETAMINOPHEN 10; 325 MG/1; MG/1
TABLET ORAL
Refills: 0 | Status: ACTIVE | COMMUNITY

## 2017-12-14 RX ORDER — SILVER SULFADIAZINE 10 MG/G
CREAM TOPICAL
Refills: 0 | Status: ACTIVE | COMMUNITY

## 2017-12-14 RX ORDER — TIMOLOL MALEATE 5 MG/1
TABLET ORAL
Refills: 0 | Status: ACTIVE | COMMUNITY

## 2017-12-14 RX ORDER — LORATADINE 5 MG
TABLET,CHEWABLE ORAL
Refills: 0 | Status: ACTIVE | COMMUNITY

## 2017-12-14 RX ORDER — TAMSULOSIN HYDROCHLORIDE 0.4 MG/1
CAPSULE ORAL
Refills: 0 | Status: ACTIVE | COMMUNITY

## 2017-12-14 RX ORDER — DIVALPROEX SODIUM 500 MG/1
TABLET, DELAYED RELEASE ORAL
Refills: 0 | Status: ACTIVE | COMMUNITY

## 2017-12-14 RX ORDER — CHOLECALCIFEROL (VITAMIN D3) 25 MCG
TABLET ORAL
Refills: 0 | Status: ACTIVE | COMMUNITY

## 2017-12-14 RX ORDER — ACETAMINOPHEN 325 MG/1
TABLET, FILM COATED ORAL
Refills: 0 | Status: ACTIVE | COMMUNITY

## 2018-01-25 ENCOUNTER — INPATIENT (INPATIENT)
Facility: HOSPITAL | Age: 63
LOS: 14 days | Discharge: NURSING, REHAB OR LONG-TERM CARE | DRG: 871 | End: 2018-02-09
Attending: INTERNAL MEDICINE | Admitting: INTERNAL MEDICINE
Payer: MEDICARE

## 2018-01-25 ENCOUNTER — APPOINTMENT (OUTPATIENT)
Dept: ORTHOPEDIC SURGERY | Facility: CLINIC | Age: 63
End: 2018-01-25
Payer: MEDICARE

## 2018-01-25 VITALS
RESPIRATION RATE: 18 BRPM | HEART RATE: 62 BPM | SYSTOLIC BLOOD PRESSURE: 110 MMHG | OXYGEN SATURATION: 98 % | DIASTOLIC BLOOD PRESSURE: 64 MMHG

## 2018-01-25 VITALS — HEART RATE: 75 BPM | DIASTOLIC BLOOD PRESSURE: 86 MMHG | SYSTOLIC BLOOD PRESSURE: 130 MMHG

## 2018-01-25 DIAGNOSIS — M25.561 PAIN IN RIGHT KNEE: ICD-10-CM

## 2018-01-25 DIAGNOSIS — Z96.649 PRESENCE OF UNSPECIFIED ARTIFICIAL HIP JOINT: Chronic | ICD-10-CM

## 2018-01-25 DIAGNOSIS — S72.122A DISPLACED FRACTURE OF LESSER TROCHANTER OF LEFT FEMUR, INITIAL ENCOUNTER FOR CLOSED FRACTURE: ICD-10-CM

## 2018-01-25 DIAGNOSIS — S72.001G FRACTURE OF UNSPECIFIED PART OF NECK OF RIGHT FEMUR, SUBSEQUENT ENCOUNTER FOR CLOSED FRACTURE WITH DELAYED HEALING: ICD-10-CM

## 2018-01-25 DIAGNOSIS — M79.604 PAIN IN RIGHT LEG: ICD-10-CM

## 2018-01-25 LAB
ALBUMIN SERPL ELPH-MCNC: 3.3 G/DL — SIGNIFICANT CHANGE UP (ref 3.3–5)
ALP SERPL-CCNC: 37 U/L — LOW (ref 40–120)
ALT FLD-CCNC: 6 U/L RC — LOW (ref 10–45)
ANION GAP SERPL CALC-SCNC: 13 MMOL/L — SIGNIFICANT CHANGE UP (ref 5–17)
AST SERPL-CCNC: 16 U/L — SIGNIFICANT CHANGE UP (ref 10–40)
BASOPHILS # BLD AUTO: 0 K/UL — SIGNIFICANT CHANGE UP (ref 0–0.2)
BASOPHILS NFR BLD AUTO: 0.7 % — SIGNIFICANT CHANGE UP (ref 0–2)
BILIRUB SERPL-MCNC: 0.4 MG/DL — SIGNIFICANT CHANGE UP (ref 0.2–1.2)
BUN SERPL-MCNC: 9 MG/DL — SIGNIFICANT CHANGE UP (ref 7–23)
CALCIUM SERPL-MCNC: 8.8 MG/DL — SIGNIFICANT CHANGE UP (ref 8.4–10.5)
CHLORIDE SERPL-SCNC: 103 MMOL/L — SIGNIFICANT CHANGE UP (ref 96–108)
CO2 SERPL-SCNC: 24 MMOL/L — SIGNIFICANT CHANGE UP (ref 22–31)
CREAT SERPL-MCNC: 0.59 MG/DL — SIGNIFICANT CHANGE UP (ref 0.5–1.3)
EOSINOPHIL # BLD AUTO: 0.4 K/UL — SIGNIFICANT CHANGE UP (ref 0–0.5)
EOSINOPHIL NFR BLD AUTO: 6.9 % — HIGH (ref 0–6)
GLUCOSE SERPL-MCNC: 87 MG/DL — SIGNIFICANT CHANGE UP (ref 70–99)
HCT VFR BLD CALC: 40.6 % — SIGNIFICANT CHANGE UP (ref 39–50)
HGB BLD-MCNC: 14.2 G/DL — SIGNIFICANT CHANGE UP (ref 13–17)
LYMPHOCYTES # BLD AUTO: 1.1 K/UL — SIGNIFICANT CHANGE UP (ref 1–3.3)
LYMPHOCYTES # BLD AUTO: 17.9 % — SIGNIFICANT CHANGE UP (ref 13–44)
MCHC RBC-ENTMCNC: 35.1 GM/DL — SIGNIFICANT CHANGE UP (ref 32–36)
MCHC RBC-ENTMCNC: 35.7 PG — HIGH (ref 27–34)
MCV RBC AUTO: 102 FL — HIGH (ref 80–100)
MONOCYTES # BLD AUTO: 0.5 K/UL — SIGNIFICANT CHANGE UP (ref 0–0.9)
MONOCYTES NFR BLD AUTO: 7.4 % — SIGNIFICANT CHANGE UP (ref 2–14)
NEUTROPHILS # BLD AUTO: 4.1 K/UL — SIGNIFICANT CHANGE UP (ref 1.8–7.4)
NEUTROPHILS NFR BLD AUTO: 67.1 % — SIGNIFICANT CHANGE UP (ref 43–77)
PLATELET # BLD AUTO: 110 K/UL — LOW (ref 150–400)
POTASSIUM SERPL-MCNC: 4.2 MMOL/L — SIGNIFICANT CHANGE UP (ref 3.5–5.3)
POTASSIUM SERPL-SCNC: 4.2 MMOL/L — SIGNIFICANT CHANGE UP (ref 3.5–5.3)
PROT SERPL-MCNC: 6.2 G/DL — SIGNIFICANT CHANGE UP (ref 6–8.3)
RBC # BLD: 3.98 M/UL — LOW (ref 4.2–5.8)
RBC # FLD: 12.7 % — SIGNIFICANT CHANGE UP (ref 10.3–14.5)
SODIUM SERPL-SCNC: 140 MMOL/L — SIGNIFICANT CHANGE UP (ref 135–145)
WBC # BLD: 6.2 K/UL — SIGNIFICANT CHANGE UP (ref 3.8–10.5)
WBC # FLD AUTO: 6.2 K/UL — SIGNIFICANT CHANGE UP (ref 3.8–10.5)

## 2018-01-25 PROCEDURE — 99285 EMERGENCY DEPT VISIT HI MDM: CPT | Mod: GC

## 2018-01-25 PROCEDURE — 73552 X-RAY EXAM OF FEMUR 2/>: CPT | Mod: 26,RT

## 2018-01-25 PROCEDURE — 73562 X-RAY EXAM OF KNEE 3: CPT | Mod: 26,RT

## 2018-01-25 PROCEDURE — 99214 OFFICE O/P EST MOD 30 MIN: CPT

## 2018-01-25 PROCEDURE — 73502 X-RAY EXAM HIP UNI 2-3 VIEWS: CPT | Mod: 26,RT

## 2018-01-25 RX ORDER — OXYCODONE AND ACETAMINOPHEN 5; 325 MG/1; MG/1
1 TABLET ORAL ONCE
Qty: 0 | Refills: 0 | Status: DISCONTINUED | OUTPATIENT
Start: 2018-01-25 | End: 2018-01-25

## 2018-01-25 RX ORDER — ACETAMINOPHEN 500 MG
1000 TABLET ORAL ONCE
Qty: 0 | Refills: 0 | Status: COMPLETED | OUTPATIENT
Start: 2018-01-25 | End: 2018-01-25

## 2018-01-25 RX ORDER — MORPHINE SULFATE 50 MG/1
2 CAPSULE, EXTENDED RELEASE ORAL ONCE
Qty: 0 | Refills: 0 | Status: DISCONTINUED | OUTPATIENT
Start: 2018-01-25 | End: 2018-01-25

## 2018-01-25 RX ADMIN — MORPHINE SULFATE 2 MILLIGRAM(S): 50 CAPSULE, EXTENDED RELEASE ORAL at 15:52

## 2018-01-25 RX ADMIN — Medication 1000 MILLIGRAM(S): at 21:52

## 2018-01-25 RX ADMIN — MORPHINE SULFATE 2 MILLIGRAM(S): 50 CAPSULE, EXTENDED RELEASE ORAL at 21:19

## 2018-01-25 RX ADMIN — Medication 400 MILLIGRAM(S): at 21:31
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
## 2018-01-25 NOTE — ED PROVIDER NOTE - PHYSICAL EXAMINATION
General: patient appears anxious  HEENT: PERRL; normal oropharynx  Neck: no JVD  Respiratory: CTAB  CV: RRR, no murmurs  GI: abdomen soft, non-tender, non-distended  Neurology: AAOx3, no focal deficits  Psych: normal mood/affect  Extremities: tenderness at R hip and R knee; R knee appears mildly swollen but no erythema; ROM significantly limited on R side  Skin: warm and dry

## 2018-01-25 NOTE — ED PROVIDER NOTE - PROGRESS NOTE DETAILS
Spoke with nursing staff at [facility], [place]. Patient has a mood disorder for which he is taking abilify and klonipin. He was sent to orthopedic clinic for knee pain, but it is unclear why he has pain and for how long. Spoke with nursing staff at [facility]. Patient has a mood disorder for which he is taking abilify and klonipin. He was sent to orthopedic clinic for knee pain, but it is unclear why he has pain and for how long. No more valuable information was given.
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
## 2018-01-25 NOTE — ED PROVIDER NOTE - OBJECTIVE STATEMENT
62-year-old man who presents to the ED with right hip/knee pain. Patient is poor historian. It is unclear when the pain started, but he says he had a recent fall. The leg pain is constant and exacerbated by movement. No fevers, chills, shortness of breath or chest pain. He was sent from his group home because he was unable to get a standing XR. 62-year-old man who presents to the ED with right hip/knee pain. Patient is poor historian. It is unclear when the pain started, but he says he had a recent fall. The leg pain is constant and exacerbated by movement. No fevers, chills, shortness of breath or chest pain. He was sent from his group home because he was unable to get a standing XR. He is wheelchair bound at baseline.

## 2018-01-25 NOTE — H&P ADULT - NSHPLABSRESULTS_GEN_ALL_CORE
14.2   6.2   )-----------( 110      ( 25 Jan 2018 13:27 )             40.6   01-25    140  |  103  |  9   ----------------------------<  87  4.2   |  24  |  0.59    Ca    8.8      25 Jan 2018 13:27    TPro  6.2  /  Alb  3.3  /  TBili  0.4  /  DBili  x   /  AST  16  /  ALT  6<L>  /  AlkPhos  37<L>  01-25

## 2018-01-26 DIAGNOSIS — J12.9 VIRAL PNEUMONIA, UNSPECIFIED: ICD-10-CM

## 2018-01-26 DIAGNOSIS — F43.10 POST-TRAUMATIC STRESS DISORDER, UNSPECIFIED: ICD-10-CM

## 2018-01-26 DIAGNOSIS — F79 UNSPECIFIED INTELLECTUAL DISABILITIES: ICD-10-CM

## 2018-01-26 DIAGNOSIS — M79.604 PAIN IN RIGHT LEG: ICD-10-CM

## 2018-01-26 DIAGNOSIS — F39 UNSPECIFIED MOOD [AFFECTIVE] DISORDER: ICD-10-CM

## 2018-01-26 LAB
APPEARANCE UR: ABNORMAL
BILIRUB UR-MCNC: NEGATIVE — SIGNIFICANT CHANGE UP
COLOR SPEC: YELLOW — SIGNIFICANT CHANGE UP
DIFF PNL FLD: NEGATIVE — SIGNIFICANT CHANGE UP
FLUAV H1 2009 PAND RNA SPEC QL NAA+PROBE: DETECTED
GLUCOSE UR QL: NEGATIVE MG/DL — SIGNIFICANT CHANGE UP
KETONES UR-MCNC: NEGATIVE — SIGNIFICANT CHANGE UP
LEUKOCYTE ESTERASE UR-ACNC: NEGATIVE — SIGNIFICANT CHANGE UP
NITRITE UR-MCNC: NEGATIVE — SIGNIFICANT CHANGE UP
PH UR: 8.5 — HIGH (ref 5–8)
PROT UR-MCNC: ABNORMAL MG/DL
RAPID RVP RESULT: DETECTED
SP GR SPEC: 1.03 — HIGH (ref 1.01–1.02)
UROBILINOGEN FLD QL: NEGATIVE MG/DL — SIGNIFICANT CHANGE UP

## 2018-01-26 PROCEDURE — 71045 X-RAY EXAM CHEST 1 VIEW: CPT | Mod: 26

## 2018-01-26 PROCEDURE — 99221 1ST HOSP IP/OBS SF/LOW 40: CPT

## 2018-01-26 PROCEDURE — 99222 1ST HOSP IP/OBS MODERATE 55: CPT

## 2018-01-26 RX ORDER — LATANOPROST 0.05 MG/ML
1 SOLUTION/ DROPS OPHTHALMIC; TOPICAL
Qty: 0 | Refills: 0 | COMMUNITY

## 2018-01-26 RX ORDER — PIPERACILLIN AND TAZOBACTAM 4; .5 G/20ML; G/20ML
3.38 INJECTION, POWDER, LYOPHILIZED, FOR SOLUTION INTRAVENOUS ONCE
Qty: 0 | Refills: 0 | Status: DISCONTINUED | OUTPATIENT
Start: 2018-01-26 | End: 2018-01-26

## 2018-01-26 RX ORDER — PIPERACILLIN AND TAZOBACTAM 4; .5 G/20ML; G/20ML
3.38 INJECTION, POWDER, LYOPHILIZED, FOR SOLUTION INTRAVENOUS ONCE
Qty: 0 | Refills: 0 | Status: COMPLETED | OUTPATIENT
Start: 2018-01-26 | End: 2018-01-26

## 2018-01-26 RX ORDER — ACETAMINOPHEN 500 MG
1000 TABLET ORAL ONCE
Qty: 0 | Refills: 0 | Status: COMPLETED | OUTPATIENT
Start: 2018-01-26 | End: 2018-01-26

## 2018-01-26 RX ORDER — BETHANECHOL CHLORIDE 25 MG
1 TABLET ORAL
Qty: 0 | Refills: 0 | COMMUNITY

## 2018-01-26 RX ORDER — TIMOLOL 0.5 %
1 DROPS OPHTHALMIC (EYE)
Qty: 0 | Refills: 0 | Status: DISCONTINUED | OUTPATIENT
Start: 2018-01-26 | End: 2018-02-09

## 2018-01-26 RX ORDER — BETHANECHOL CHLORIDE 25 MG
25 TABLET ORAL THREE TIMES A DAY
Qty: 0 | Refills: 0 | Status: DISCONTINUED | OUTPATIENT
Start: 2018-01-26 | End: 2018-02-09

## 2018-01-26 RX ORDER — LATANOPROST 0.05 MG/ML
1 SOLUTION/ DROPS OPHTHALMIC; TOPICAL AT BEDTIME
Qty: 0 | Refills: 0 | Status: DISCONTINUED | OUTPATIENT
Start: 2018-01-26 | End: 2018-02-09

## 2018-01-26 RX ORDER — VALPROIC ACID (AS SODIUM SALT) 250 MG/5ML
20 SOLUTION, ORAL ORAL
Qty: 0 | Refills: 0 | COMMUNITY

## 2018-01-26 RX ORDER — ACETAZOLAMIDE 250 MG/1
500 TABLET ORAL
Qty: 0 | Refills: 0 | Status: DISCONTINUED | OUTPATIENT
Start: 2018-01-26 | End: 2018-02-09

## 2018-01-26 RX ORDER — ARIPIPRAZOLE 15 MG/1
20 TABLET ORAL DAILY
Qty: 0 | Refills: 0 | Status: DISCONTINUED | OUTPATIENT
Start: 2018-01-26 | End: 2018-02-09

## 2018-01-26 RX ORDER — BRIMONIDINE TARTRATE 2 MG/MG
1 SOLUTION/ DROPS OPHTHALMIC THREE TIMES A DAY
Qty: 0 | Refills: 0 | Status: DISCONTINUED | OUTPATIENT
Start: 2018-01-26 | End: 2018-02-09

## 2018-01-26 RX ORDER — SENNA PLUS 8.6 MG/1
2 TABLET ORAL
Qty: 0 | Refills: 0 | COMMUNITY

## 2018-01-26 RX ORDER — CHOLECALCIFEROL (VITAMIN D3) 125 MCG
1 CAPSULE ORAL
Qty: 0 | Refills: 0 | COMMUNITY

## 2018-01-26 RX ORDER — BENZTROPINE MESYLATE 1 MG
1 TABLET ORAL
Qty: 0 | Refills: 0 | Status: DISCONTINUED | OUTPATIENT
Start: 2018-01-26 | End: 2018-02-09

## 2018-01-26 RX ORDER — PIPERACILLIN AND TAZOBACTAM 4; .5 G/20ML; G/20ML
3.38 INJECTION, POWDER, LYOPHILIZED, FOR SOLUTION INTRAVENOUS EVERY 8 HOURS
Qty: 0 | Refills: 0 | Status: DISCONTINUED | OUTPATIENT
Start: 2018-01-26 | End: 2018-01-26

## 2018-01-26 RX ORDER — LANOLIN ALCOHOL/MO/W.PET/CERES
1 CREAM (GRAM) TOPICAL
Qty: 0 | Refills: 0 | COMMUNITY

## 2018-01-26 RX ORDER — BRIMONIDINE TARTRATE 2 MG/MG
1 SOLUTION/ DROPS OPHTHALMIC
Qty: 0 | Refills: 0 | COMMUNITY

## 2018-01-26 RX ORDER — TAMSULOSIN HYDROCHLORIDE 0.4 MG/1
0.4 CAPSULE ORAL AT BEDTIME
Qty: 0 | Refills: 0 | Status: DISCONTINUED | OUTPATIENT
Start: 2018-01-26 | End: 2018-02-09

## 2018-01-26 RX ORDER — CITALOPRAM 10 MG/1
40 TABLET, FILM COATED ORAL DAILY
Qty: 0 | Refills: 0 | Status: DISCONTINUED | OUTPATIENT
Start: 2018-01-26 | End: 2018-02-09

## 2018-01-26 RX ORDER — POLYETHYLENE GLYCOL 3350 17 G/17G
17 POWDER, FOR SOLUTION ORAL DAILY
Qty: 0 | Refills: 0 | Status: DISCONTINUED | OUTPATIENT
Start: 2018-01-26 | End: 2018-02-09

## 2018-01-26 RX ORDER — OXYCODONE AND ACETAMINOPHEN 5; 325 MG/1; MG/1
2 TABLET ORAL EVERY 6 HOURS
Qty: 0 | Refills: 0 | Status: DISCONTINUED | OUTPATIENT
Start: 2018-01-26 | End: 2018-01-31

## 2018-01-26 RX ORDER — VALPROIC ACID (AS SODIUM SALT) 250 MG/5ML
1000 SOLUTION, ORAL ORAL
Qty: 0 | Refills: 0 | Status: DISCONTINUED | OUTPATIENT
Start: 2018-01-26 | End: 2018-02-09

## 2018-01-26 RX ADMIN — ACETAZOLAMIDE 500 MILLIGRAM(S): 250 TABLET ORAL at 17:46

## 2018-01-26 RX ADMIN — Medication 1 TABLET(S): at 13:21

## 2018-01-26 RX ADMIN — Medication 75 MILLIGRAM(S): at 17:46

## 2018-01-26 RX ADMIN — LATANOPROST 1 DROP(S): 0.05 SOLUTION/ DROPS OPHTHALMIC; TOPICAL at 21:24

## 2018-01-26 RX ADMIN — BRIMONIDINE TARTRATE 1 DROP(S): 2 SOLUTION/ DROPS OPHTHALMIC at 05:47

## 2018-01-26 RX ADMIN — Medication 1000 MILLIGRAM(S): at 05:42

## 2018-01-26 RX ADMIN — CITALOPRAM 40 MILLIGRAM(S): 10 TABLET, FILM COATED ORAL at 13:21

## 2018-01-26 RX ADMIN — POLYETHYLENE GLYCOL 3350 17 GRAM(S): 17 POWDER, FOR SOLUTION ORAL at 13:22

## 2018-01-26 RX ADMIN — PIPERACILLIN AND TAZOBACTAM 200 GRAM(S): 4; .5 INJECTION, POWDER, LYOPHILIZED, FOR SOLUTION INTRAVENOUS at 09:13

## 2018-01-26 RX ADMIN — Medication 1 DROP(S): at 05:46

## 2018-01-26 RX ADMIN — Medication 25 MILLIGRAM(S): at 13:23

## 2018-01-26 RX ADMIN — Medication 1 MILLIGRAM(S): at 17:47

## 2018-01-26 RX ADMIN — TAMSULOSIN HYDROCHLORIDE 0.4 MILLIGRAM(S): 0.4 CAPSULE ORAL at 21:25

## 2018-01-26 RX ADMIN — Medication 0.5 MILLIGRAM(S): at 21:32

## 2018-01-26 RX ADMIN — Medication 25 MILLIGRAM(S): at 05:38

## 2018-01-26 RX ADMIN — Medication 400 MILLIGRAM(S): at 07:50

## 2018-01-26 RX ADMIN — BRIMONIDINE TARTRATE 1 DROP(S): 2 SOLUTION/ DROPS OPHTHALMIC at 13:22

## 2018-01-26 RX ADMIN — ACETAZOLAMIDE 500 MILLIGRAM(S): 250 TABLET ORAL at 05:38

## 2018-01-26 RX ADMIN — Medication 1 MILLIGRAM(S): at 05:38

## 2018-01-26 RX ADMIN — Medication 1000 MILLIGRAM(S): at 17:47

## 2018-01-26 RX ADMIN — Medication 1 DROP(S): at 17:47

## 2018-01-26 RX ADMIN — BRIMONIDINE TARTRATE 1 DROP(S): 2 SOLUTION/ DROPS OPHTHALMIC at 21:24

## 2018-01-26 RX ADMIN — Medication 25 MILLIGRAM(S): at 21:25

## 2018-01-26 RX ADMIN — ARIPIPRAZOLE 20 MILLIGRAM(S): 15 TABLET ORAL at 13:19

## 2018-01-26 NOTE — CONSULT NOTE ADULT - ASSESSMENT
A/P: 62y Male with incidental finding on xray and likely chronic R IT fracture\    based on exam very low suspicion for acute hip fx, pt asymptomatic  Pain control  NWB RLE until CT done  imaging reviewed  FU CT right hip  c/w med mgmt  no acute orthopedic surgical intervention at this time  conservative mgmt  Will discuss with attending and advise if plan changes A/P: 62y Male with incidental finding on xray and likely chronic degenerative changes vs chronic injury    based on exam very low suspicion for acute hip fx, pt asymptomatic  Pain control  NWB RLE  imaging reviewed  CT reviewed pending official read, but prelim read shows no acute fx  will fu official ct read  c/w med mgmt  no acute orthopedic surgical intervention at this time  conservative mgmt  Will discuss with attending and advise if plan changes

## 2018-01-26 NOTE — CONSULT NOTE ADULT - ASSESSMENT
62 m with mild intellectual disability, liam disorder, psychosis, PTSD, anxiety, chronic hip and knee pain, wheelchair bound,  presented from the group home for hip and knee pain. His last admission Nov 2017 was due to same complain and no active issues were found, but last night pt was found to have rigors with oral temp of 100.1 but rectal temp was 103.8. patient is a poor historian and just complains of hip pain due to a fall. Blood and urine cx were sent, CXR: clear but RVP showed Flu, pt was started on tamiflu and zosyn for sepsis    fever, rhinorhea, cough, RVP: AH3, normal WBC, exam normal  Flu    can DC zosyn  f/u the blood culture  c/w tamiflu for 5 days
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
## 2018-01-27 LAB
ANION GAP SERPL CALC-SCNC: 14 MMOL/L — SIGNIFICANT CHANGE UP (ref 5–17)
BUN SERPL-MCNC: 10 MG/DL — SIGNIFICANT CHANGE UP (ref 7–23)
CALCIUM SERPL-MCNC: 8.3 MG/DL — LOW (ref 8.4–10.5)
CHLORIDE SERPL-SCNC: 106 MMOL/L — SIGNIFICANT CHANGE UP (ref 96–108)
CO2 SERPL-SCNC: 19 MMOL/L — LOW (ref 22–31)
CREAT SERPL-MCNC: 0.65 MG/DL — SIGNIFICANT CHANGE UP (ref 0.5–1.3)
GLUCOSE SERPL-MCNC: 108 MG/DL — HIGH (ref 70–99)
HCT VFR BLD CALC: 37.2 % — LOW (ref 39–50)
HGB BLD-MCNC: 12.1 G/DL — LOW (ref 13–17)
MCHC RBC-ENTMCNC: 31.9 PG — SIGNIFICANT CHANGE UP (ref 27–34)
MCHC RBC-ENTMCNC: 32.5 GM/DL — SIGNIFICANT CHANGE UP (ref 32–36)
MCV RBC AUTO: 98.2 FL — SIGNIFICANT CHANGE UP (ref 80–100)
PLATELET # BLD AUTO: 102 K/UL — LOW (ref 150–400)
POTASSIUM SERPL-MCNC: 3.4 MMOL/L — LOW (ref 3.5–5.3)
POTASSIUM SERPL-SCNC: 3.4 MMOL/L — LOW (ref 3.5–5.3)
RBC # BLD: 3.79 M/UL — LOW (ref 4.2–5.8)
RBC # FLD: 13.5 % — SIGNIFICANT CHANGE UP (ref 10.3–14.5)
SODIUM SERPL-SCNC: 139 MMOL/L — SIGNIFICANT CHANGE UP (ref 135–145)
WBC # BLD: 4.65 K/UL — SIGNIFICANT CHANGE UP (ref 3.8–10.5)
WBC # FLD AUTO: 4.65 K/UL — SIGNIFICANT CHANGE UP (ref 3.8–10.5)

## 2018-01-27 PROCEDURE — 76377 3D RENDER W/INTRP POSTPROCES: CPT | Mod: 26

## 2018-01-27 PROCEDURE — 72192 CT PELVIS W/O DYE: CPT | Mod: 26

## 2018-01-27 RX ORDER — POTASSIUM CHLORIDE 20 MEQ
40 PACKET (EA) ORAL ONCE
Qty: 0 | Refills: 0 | Status: COMPLETED | OUTPATIENT
Start: 2018-01-27 | End: 2018-01-27

## 2018-01-27 RX ADMIN — LATANOPROST 1 DROP(S): 0.05 SOLUTION/ DROPS OPHTHALMIC; TOPICAL at 21:05

## 2018-01-27 RX ADMIN — TAMSULOSIN HYDROCHLORIDE 0.4 MILLIGRAM(S): 0.4 CAPSULE ORAL at 21:05

## 2018-01-27 RX ADMIN — CITALOPRAM 40 MILLIGRAM(S): 10 TABLET, FILM COATED ORAL at 13:12

## 2018-01-27 RX ADMIN — BRIMONIDINE TARTRATE 1 DROP(S): 2 SOLUTION/ DROPS OPHTHALMIC at 05:08

## 2018-01-27 RX ADMIN — Medication 1 MILLIGRAM(S): at 17:05

## 2018-01-27 RX ADMIN — Medication 1000 MILLIGRAM(S): at 05:07

## 2018-01-27 RX ADMIN — Medication 0.5 MILLIGRAM(S): at 21:05

## 2018-01-27 RX ADMIN — BRIMONIDINE TARTRATE 1 DROP(S): 2 SOLUTION/ DROPS OPHTHALMIC at 13:12

## 2018-01-27 RX ADMIN — Medication 75 MILLIGRAM(S): at 17:05

## 2018-01-27 RX ADMIN — Medication 25 MILLIGRAM(S): at 21:05

## 2018-01-27 RX ADMIN — Medication 25 MILLIGRAM(S): at 13:12

## 2018-01-27 RX ADMIN — BRIMONIDINE TARTRATE 1 DROP(S): 2 SOLUTION/ DROPS OPHTHALMIC at 21:06

## 2018-01-27 RX ADMIN — Medication 40 MILLIEQUIVALENT(S): at 18:51

## 2018-01-27 RX ADMIN — Medication 1 TABLET(S): at 13:12

## 2018-01-27 RX ADMIN — Medication 75 MILLIGRAM(S): at 05:08

## 2018-01-27 RX ADMIN — ACETAZOLAMIDE 500 MILLIGRAM(S): 250 TABLET ORAL at 05:08

## 2018-01-27 RX ADMIN — Medication 1 DROP(S): at 05:08

## 2018-01-27 RX ADMIN — Medication 1 DROP(S): at 17:05

## 2018-01-27 RX ADMIN — ARIPIPRAZOLE 20 MILLIGRAM(S): 15 TABLET ORAL at 13:12

## 2018-01-27 RX ADMIN — ACETAZOLAMIDE 500 MILLIGRAM(S): 250 TABLET ORAL at 17:05

## 2018-01-27 RX ADMIN — Medication 1 MILLIGRAM(S): at 05:08

## 2018-01-27 RX ADMIN — Medication 1000 MILLIGRAM(S): at 17:05

## 2018-01-27 RX ADMIN — Medication 25 MILLIGRAM(S): at 05:08

## 2018-01-28 LAB
ANION GAP SERPL CALC-SCNC: 18 MMOL/L — HIGH (ref 5–17)
BUN SERPL-MCNC: 12 MG/DL — SIGNIFICANT CHANGE UP (ref 7–23)
CALCIUM SERPL-MCNC: 8.7 MG/DL — SIGNIFICANT CHANGE UP (ref 8.4–10.5)
CHLORIDE SERPL-SCNC: 113 MMOL/L — HIGH (ref 96–108)
CO2 SERPL-SCNC: 18 MMOL/L — LOW (ref 22–31)
CREAT SERPL-MCNC: 0.71 MG/DL — SIGNIFICANT CHANGE UP (ref 0.5–1.3)
GLUCOSE SERPL-MCNC: 77 MG/DL — SIGNIFICANT CHANGE UP (ref 70–99)
POTASSIUM SERPL-MCNC: 3.7 MMOL/L — SIGNIFICANT CHANGE UP (ref 3.5–5.3)
POTASSIUM SERPL-SCNC: 3.7 MMOL/L — SIGNIFICANT CHANGE UP (ref 3.5–5.3)
SODIUM SERPL-SCNC: 149 MMOL/L — HIGH (ref 135–145)

## 2018-01-28 RX ORDER — SODIUM CHLORIDE 9 MG/ML
500 INJECTION INTRAMUSCULAR; INTRAVENOUS; SUBCUTANEOUS ONCE
Qty: 0 | Refills: 0 | Status: COMPLETED | OUTPATIENT
Start: 2018-01-28 | End: 2018-01-28

## 2018-01-28 RX ORDER — POTASSIUM CHLORIDE 20 MEQ
20 PACKET (EA) ORAL ONCE
Qty: 0 | Refills: 0 | Status: COMPLETED | OUTPATIENT
Start: 2018-01-28 | End: 2018-01-28

## 2018-01-28 RX ORDER — SODIUM CHLORIDE 9 MG/ML
1000 INJECTION INTRAMUSCULAR; INTRAVENOUS; SUBCUTANEOUS
Qty: 0 | Refills: 0 | Status: DISCONTINUED | OUTPATIENT
Start: 2018-01-28 | End: 2018-01-30

## 2018-01-28 RX ADMIN — Medication 1 DROP(S): at 06:08

## 2018-01-28 RX ADMIN — BRIMONIDINE TARTRATE 1 DROP(S): 2 SOLUTION/ DROPS OPHTHALMIC at 06:08

## 2018-01-28 RX ADMIN — Medication 20 MILLIEQUIVALENT(S): at 14:02

## 2018-01-28 RX ADMIN — POLYETHYLENE GLYCOL 3350 17 GRAM(S): 17 POWDER, FOR SOLUTION ORAL at 11:12

## 2018-01-28 RX ADMIN — Medication 1 TABLET(S): at 11:13

## 2018-01-28 RX ADMIN — ACETAZOLAMIDE 500 MILLIGRAM(S): 250 TABLET ORAL at 06:04

## 2018-01-28 RX ADMIN — BRIMONIDINE TARTRATE 1 DROP(S): 2 SOLUTION/ DROPS OPHTHALMIC at 21:23

## 2018-01-28 RX ADMIN — BRIMONIDINE TARTRATE 1 DROP(S): 2 SOLUTION/ DROPS OPHTHALMIC at 14:03

## 2018-01-28 RX ADMIN — ACETAZOLAMIDE 500 MILLIGRAM(S): 250 TABLET ORAL at 16:45

## 2018-01-28 RX ADMIN — SODIUM CHLORIDE 75 MILLILITER(S): 9 INJECTION INTRAMUSCULAR; INTRAVENOUS; SUBCUTANEOUS at 16:42

## 2018-01-28 RX ADMIN — TAMSULOSIN HYDROCHLORIDE 0.4 MILLIGRAM(S): 0.4 CAPSULE ORAL at 21:23

## 2018-01-28 RX ADMIN — Medication 75 MILLIGRAM(S): at 16:44

## 2018-01-28 RX ADMIN — LATANOPROST 1 DROP(S): 0.05 SOLUTION/ DROPS OPHTHALMIC; TOPICAL at 21:22

## 2018-01-28 RX ADMIN — ARIPIPRAZOLE 20 MILLIGRAM(S): 15 TABLET ORAL at 11:11

## 2018-01-28 RX ADMIN — Medication 1000 MILLIGRAM(S): at 16:47

## 2018-01-28 RX ADMIN — Medication 0.5 MILLIGRAM(S): at 21:23

## 2018-01-28 RX ADMIN — Medication 75 MILLIGRAM(S): at 06:04

## 2018-01-28 RX ADMIN — Medication 25 MILLIGRAM(S): at 21:22

## 2018-01-28 RX ADMIN — Medication 25 MILLIGRAM(S): at 14:04

## 2018-01-28 RX ADMIN — Medication 1000 MILLIGRAM(S): at 06:07

## 2018-01-28 RX ADMIN — CITALOPRAM 40 MILLIGRAM(S): 10 TABLET, FILM COATED ORAL at 11:13

## 2018-01-28 RX ADMIN — Medication 1 DROP(S): at 16:43

## 2018-01-28 RX ADMIN — Medication 1 MILLIGRAM(S): at 06:04

## 2018-01-28 RX ADMIN — Medication 1 MILLIGRAM(S): at 16:45

## 2018-01-28 RX ADMIN — Medication 25 MILLIGRAM(S): at 06:05

## 2018-01-28 RX ADMIN — SODIUM CHLORIDE 9999 MILLILITER(S): 9 INJECTION INTRAMUSCULAR; INTRAVENOUS; SUBCUTANEOUS at 16:42

## 2018-01-29 LAB
ANION GAP SERPL CALC-SCNC: 10 MMOL/L — SIGNIFICANT CHANGE UP (ref 5–17)
BUN SERPL-MCNC: 11 MG/DL — SIGNIFICANT CHANGE UP (ref 7–23)
CALCIUM SERPL-MCNC: 8.2 MG/DL — LOW (ref 8.4–10.5)
CHLORIDE SERPL-SCNC: 113 MMOL/L — HIGH (ref 96–108)
CO2 SERPL-SCNC: 21 MMOL/L — LOW (ref 22–31)
CREAT SERPL-MCNC: 0.59 MG/DL — SIGNIFICANT CHANGE UP (ref 0.5–1.3)
GLUCOSE SERPL-MCNC: 85 MG/DL — SIGNIFICANT CHANGE UP (ref 70–99)
POTASSIUM SERPL-MCNC: 3.7 MMOL/L — SIGNIFICANT CHANGE UP (ref 3.5–5.3)
POTASSIUM SERPL-SCNC: 3.7 MMOL/L — SIGNIFICANT CHANGE UP (ref 3.5–5.3)
SODIUM SERPL-SCNC: 144 MMOL/L — SIGNIFICANT CHANGE UP (ref 135–145)

## 2018-01-29 PROCEDURE — 99232 SBSQ HOSP IP/OBS MODERATE 35: CPT

## 2018-01-29 RX ADMIN — Medication 1 TABLET(S): at 12:11

## 2018-01-29 RX ADMIN — Medication 100 MILLIGRAM(S): at 18:31

## 2018-01-29 RX ADMIN — LATANOPROST 1 DROP(S): 0.05 SOLUTION/ DROPS OPHTHALMIC; TOPICAL at 21:09

## 2018-01-29 RX ADMIN — POLYETHYLENE GLYCOL 3350 17 GRAM(S): 17 POWDER, FOR SOLUTION ORAL at 12:11

## 2018-01-29 RX ADMIN — Medication 1 MILLIGRAM(S): at 05:58

## 2018-01-29 RX ADMIN — Medication 1000 MILLIGRAM(S): at 17:28

## 2018-01-29 RX ADMIN — Medication 1 DROP(S): at 17:27

## 2018-01-29 RX ADMIN — Medication 75 MILLIGRAM(S): at 17:27

## 2018-01-29 RX ADMIN — Medication 75 MILLIGRAM(S): at 05:58

## 2018-01-29 RX ADMIN — SODIUM CHLORIDE 75 MILLILITER(S): 9 INJECTION INTRAMUSCULAR; INTRAVENOUS; SUBCUTANEOUS at 05:56

## 2018-01-29 RX ADMIN — Medication 25 MILLIGRAM(S): at 21:08

## 2018-01-29 RX ADMIN — BRIMONIDINE TARTRATE 1 DROP(S): 2 SOLUTION/ DROPS OPHTHALMIC at 21:09

## 2018-01-29 RX ADMIN — Medication 1 MILLIGRAM(S): at 17:27

## 2018-01-29 RX ADMIN — Medication 1000 MILLIGRAM(S): at 06:00

## 2018-01-29 RX ADMIN — Medication 25 MILLIGRAM(S): at 05:58

## 2018-01-29 RX ADMIN — TAMSULOSIN HYDROCHLORIDE 0.4 MILLIGRAM(S): 0.4 CAPSULE ORAL at 21:08

## 2018-01-29 RX ADMIN — CITALOPRAM 40 MILLIGRAM(S): 10 TABLET, FILM COATED ORAL at 12:10

## 2018-01-29 RX ADMIN — Medication 100 MILLIGRAM(S): at 12:18

## 2018-01-29 RX ADMIN — BRIMONIDINE TARTRATE 1 DROP(S): 2 SOLUTION/ DROPS OPHTHALMIC at 12:22

## 2018-01-29 RX ADMIN — ACETAZOLAMIDE 500 MILLIGRAM(S): 250 TABLET ORAL at 17:27

## 2018-01-29 RX ADMIN — ARIPIPRAZOLE 20 MILLIGRAM(S): 15 TABLET ORAL at 12:10

## 2018-01-29 RX ADMIN — Medication 0.5 MILLIGRAM(S): at 21:08

## 2018-01-29 RX ADMIN — BRIMONIDINE TARTRATE 1 DROP(S): 2 SOLUTION/ DROPS OPHTHALMIC at 05:58

## 2018-01-29 RX ADMIN — ACETAZOLAMIDE 500 MILLIGRAM(S): 250 TABLET ORAL at 05:57

## 2018-01-29 RX ADMIN — Medication 25 MILLIGRAM(S): at 12:21

## 2018-01-29 NOTE — PROGRESS NOTE ADULT - ASSESSMENT
62 m with mild intellectual disability, liam disorder, psychosis, PTSD, anxiety, chronic hip and knee pain, wheelchair bound,  presented from the group home for hip and knee pain. His last admission Nov 2017 was due to same complain and no active issues were found, but last night pt was found to have rigors with oral temp of 100.1 but rectal temp was 103.8. patient is a poor historian and just complains of hip pain due to a fall. Blood and urine cx were sent, CXR: clear but RVP showed Flu, pt was started on tamiflu and zosyn for sepsis    fever, rhinorrhea cough, RVP: AH3, normal WBC, exam normal, blood cx negative  Flu    tomorrow will be the 5th day of tamiflu  no antibiotics

## 2018-01-30 ENCOUNTER — TRANSCRIPTION ENCOUNTER (OUTPATIENT)
Age: 63
End: 2018-01-30

## 2018-01-30 PROBLEM — S72.001G: Status: ACTIVE | Noted: 2017-12-14

## 2018-01-30 PROBLEM — S72.122A: Status: ACTIVE | Noted: 2017-12-14

## 2018-01-30 LAB
ANION GAP SERPL CALC-SCNC: 11 MMOL/L — SIGNIFICANT CHANGE UP (ref 5–17)
BUN SERPL-MCNC: 8 MG/DL — SIGNIFICANT CHANGE UP (ref 7–23)
CALCIUM SERPL-MCNC: 8.3 MG/DL — LOW (ref 8.4–10.5)
CHLORIDE SERPL-SCNC: 111 MMOL/L — HIGH (ref 96–108)
CO2 SERPL-SCNC: 22 MMOL/L — SIGNIFICANT CHANGE UP (ref 22–31)
CREAT SERPL-MCNC: 0.66 MG/DL — SIGNIFICANT CHANGE UP (ref 0.5–1.3)
GLUCOSE SERPL-MCNC: 86 MG/DL — SIGNIFICANT CHANGE UP (ref 70–99)
HCT VFR BLD CALC: 38.8 % — LOW (ref 39–50)
HGB BLD-MCNC: 13.4 G/DL — SIGNIFICANT CHANGE UP (ref 13–17)
MCHC RBC-ENTMCNC: 34.6 GM/DL — SIGNIFICANT CHANGE UP (ref 32–36)
MCHC RBC-ENTMCNC: 35.4 PG — HIGH (ref 27–34)
MCV RBC AUTO: 102 FL — HIGH (ref 80–100)
PLATELET # BLD AUTO: 106 K/UL — LOW (ref 150–400)
POTASSIUM SERPL-MCNC: 3.6 MMOL/L — SIGNIFICANT CHANGE UP (ref 3.5–5.3)
POTASSIUM SERPL-SCNC: 3.6 MMOL/L — SIGNIFICANT CHANGE UP (ref 3.5–5.3)
RBC # BLD: 3.79 M/UL — LOW (ref 4.2–5.8)
RBC # FLD: 12.5 % — SIGNIFICANT CHANGE UP (ref 10.3–14.5)
SODIUM SERPL-SCNC: 144 MMOL/L — SIGNIFICANT CHANGE UP (ref 135–145)
WBC # BLD: 4.5 K/UL — SIGNIFICANT CHANGE UP (ref 3.8–10.5)
WBC # FLD AUTO: 4.5 K/UL — SIGNIFICANT CHANGE UP (ref 3.8–10.5)

## 2018-01-30 RX ORDER — ACETAZOLAMIDE 250 MG/1
1 TABLET ORAL
Qty: 0 | Refills: 0 | COMMUNITY

## 2018-01-30 RX ORDER — ARIPIPRAZOLE 15 MG/1
1 TABLET ORAL
Qty: 0 | Refills: 0 | COMMUNITY

## 2018-01-30 RX ORDER — LATANOPROST 0.05 MG/ML
1 SOLUTION/ DROPS OPHTHALMIC; TOPICAL
Qty: 0 | Refills: 0 | COMMUNITY

## 2018-01-30 RX ORDER — TIMOLOL 0.5 %
1 DROPS OPHTHALMIC (EYE)
Qty: 0 | Refills: 0 | COMMUNITY

## 2018-01-30 RX ORDER — POLYETHYLENE GLYCOL 3350 17 G/17G
17 POWDER, FOR SOLUTION ORAL
Qty: 0 | Refills: 0 | COMMUNITY
Start: 2018-01-30

## 2018-01-30 RX ORDER — SODIUM CHLORIDE 9 MG/ML
500 INJECTION INTRAMUSCULAR; INTRAVENOUS; SUBCUTANEOUS ONCE
Qty: 0 | Refills: 0 | Status: COMPLETED | OUTPATIENT
Start: 2018-01-30 | End: 2018-01-30

## 2018-01-30 RX ORDER — POLYETHYLENE GLYCOL 3350 17 G/17G
17 POWDER, FOR SOLUTION ORAL
Qty: 0 | Refills: 0 | COMMUNITY

## 2018-01-30 RX ORDER — POTASSIUM CHLORIDE 20 MEQ
40 PACKET (EA) ORAL ONCE
Qty: 0 | Refills: 0 | Status: COMPLETED | OUTPATIENT
Start: 2018-01-30 | End: 2018-01-30

## 2018-01-30 RX ORDER — NYSTATIN 500MM UNIT
0 POWDER (EA) MISCELLANEOUS
Qty: 0 | Refills: 0 | COMMUNITY

## 2018-01-30 RX ORDER — ACETAMINOPHEN 500 MG
2 TABLET ORAL
Qty: 0 | Refills: 0 | COMMUNITY

## 2018-01-30 RX ORDER — BENZTROPINE MESYLATE 1 MG
1 TABLET ORAL
Qty: 0 | Refills: 0 | COMMUNITY

## 2018-01-30 RX ORDER — ARIPIPRAZOLE 15 MG/1
1 TABLET ORAL
Qty: 0 | Refills: 0 | COMMUNITY
Start: 2018-01-30

## 2018-01-30 RX ORDER — ACETAZOLAMIDE 250 MG/1
1 TABLET ORAL
Qty: 0 | Refills: 0 | COMMUNITY
Start: 2018-01-30

## 2018-01-30 RX ORDER — CITALOPRAM 10 MG/1
1 TABLET, FILM COATED ORAL
Qty: 0 | Refills: 0 | COMMUNITY

## 2018-01-30 RX ORDER — BRIMONIDINE TARTRATE 2 MG/MG
1 SOLUTION/ DROPS OPHTHALMIC
Qty: 0 | Refills: 0 | COMMUNITY

## 2018-01-30 RX ORDER — TAMSULOSIN HYDROCHLORIDE 0.4 MG/1
1 CAPSULE ORAL
Qty: 0 | Refills: 0 | COMMUNITY

## 2018-01-30 RX ADMIN — POLYETHYLENE GLYCOL 3350 17 GRAM(S): 17 POWDER, FOR SOLUTION ORAL at 12:21

## 2018-01-30 RX ADMIN — Medication 25 MILLIGRAM(S): at 12:22

## 2018-01-30 RX ADMIN — Medication 40 MILLIEQUIVALENT(S): at 12:20

## 2018-01-30 RX ADMIN — Medication 25 MILLIGRAM(S): at 06:10

## 2018-01-30 RX ADMIN — SODIUM CHLORIDE 500 MILLILITER(S): 9 INJECTION INTRAMUSCULAR; INTRAVENOUS; SUBCUTANEOUS at 12:25

## 2018-01-30 RX ADMIN — Medication 100 MILLIGRAM(S): at 12:22

## 2018-01-30 RX ADMIN — Medication 1000 MILLIGRAM(S): at 06:11

## 2018-01-30 RX ADMIN — ARIPIPRAZOLE 20 MILLIGRAM(S): 15 TABLET ORAL at 12:20

## 2018-01-30 RX ADMIN — Medication 1 DROP(S): at 17:55

## 2018-01-30 RX ADMIN — Medication 1000 MILLIGRAM(S): at 17:54

## 2018-01-30 RX ADMIN — ACETAZOLAMIDE 500 MILLIGRAM(S): 250 TABLET ORAL at 06:10

## 2018-01-30 RX ADMIN — CITALOPRAM 40 MILLIGRAM(S): 10 TABLET, FILM COATED ORAL at 12:20

## 2018-01-30 RX ADMIN — Medication 1 MILLIGRAM(S): at 06:10

## 2018-01-30 RX ADMIN — Medication 1 DROP(S): at 06:11

## 2018-01-30 RX ADMIN — ACETAZOLAMIDE 500 MILLIGRAM(S): 250 TABLET ORAL at 17:56

## 2018-01-30 RX ADMIN — LATANOPROST 1 DROP(S): 0.05 SOLUTION/ DROPS OPHTHALMIC; TOPICAL at 22:05

## 2018-01-30 RX ADMIN — Medication 75 MILLIGRAM(S): at 17:56

## 2018-01-30 RX ADMIN — BRIMONIDINE TARTRATE 1 DROP(S): 2 SOLUTION/ DROPS OPHTHALMIC at 22:05

## 2018-01-30 RX ADMIN — Medication 1 TABLET(S): at 12:21

## 2018-01-30 RX ADMIN — BRIMONIDINE TARTRATE 1 DROP(S): 2 SOLUTION/ DROPS OPHTHALMIC at 06:10

## 2018-01-30 RX ADMIN — TAMSULOSIN HYDROCHLORIDE 0.4 MILLIGRAM(S): 0.4 CAPSULE ORAL at 22:05

## 2018-01-30 RX ADMIN — Medication 75 MILLIGRAM(S): at 06:10

## 2018-01-30 RX ADMIN — Medication 1 MILLIGRAM(S): at 17:56

## 2018-01-30 RX ADMIN — Medication 25 MILLIGRAM(S): at 22:04

## 2018-01-30 RX ADMIN — Medication 100 MILLIGRAM(S): at 18:01

## 2018-01-30 RX ADMIN — Medication 0.5 MILLIGRAM(S): at 22:05

## 2018-01-30 RX ADMIN — BRIMONIDINE TARTRATE 1 DROP(S): 2 SOLUTION/ DROPS OPHTHALMIC at 12:22

## 2018-01-30 NOTE — DISCHARGE NOTE ADULT - PLAN OF CARE
-XR with no fracture  -Pain control  -Non-weight bearing RLE  -No acute orthopedic surgical intervention at this time  -Follow-up with Orthopedic outpatient as needed -Continue all medications as prescribed To prevent sepsis -Completed course of Tamiflu with relief of symptoms  Call you Health care provider upon arrival home to make a one week follow up appointment.  If you develop fever, chills, malaise, or change in mental status call your Health Care Provider or go to the Emergency Department.  Nutrition is important, eat small frequent meals to help ensure you get adequate calories.  Do not stay in bed all day!  Increase your activity daily as tolerated.

## 2018-01-30 NOTE — DISCHARGE NOTE ADULT - CARE PROVIDER_API CALL
Nahid Gomez (JB), Internal Medicine  [address] [place] [address]  Phone: [phone]  Fax: [phone]    Maverick Ren), Orthopaedic Surgery  [address] [address]  Phone: [phone]  Fax: [phone]

## 2018-01-30 NOTE — DISCHARGE NOTE ADULT - HOSPITAL COURSE
62 m with pmhx of mild intellectual disability, mood disorder, psychosis, PTSD, anxiety, chronic hip and knee pain, wheelchair bound,  presented from the group home for hip and knee pain., and onset of rigors/oral temp of 100.1 but rectal temp was 103.8. In the ED, Blood and urine cx were sent, CXR: clear lungs, but RVP showed Flu A with signs of sepsis. Pt completed 5-day course of Tamiflu, initially placed on empiric broad spectrum (IV Zosyn), and then d'katherine for negative cultures. Pt with clinical improvement, hemodynamically stable, deem appropriate for disposition to [facility] as recommended by PT to improve functional status.

## 2018-01-30 NOTE — DISCHARGE NOTE ADULT - CARE PLAN
Principal Discharge DX:	Influenza A  Goal:	To prevent sepsis  Assessment and plan of treatment:	-Completed course of Tamiflu with relief of symptoms  Call you Health care provider upon arrival home to make a one week follow up appointment.  If you develop fever, chills, malaise, or change in mental status call your Health Care Provider or go to the Emergency Department.  Nutrition is important, eat small frequent meals to help ensure you get adequate calories.  Do not stay in bed all day!  Increase your activity daily as tolerated.  Secondary Diagnosis:	Right leg pain  Assessment and plan of treatment:	-XR with no fracture  -Pain control  -Non-weight bearing RLE  -No acute orthopedic surgical intervention at this time  -Follow-up with Orthopedic outpatient as needed  Secondary Diagnosis:	Mood disorder  Assessment and plan of treatment:	-Continue all medications as prescribed

## 2018-01-30 NOTE — PHYSICAL THERAPY INITIAL EVALUATION ADULT - PERTINENT HX OF CURRENT PROBLEM, REHAB EVAL
62 y.o. M, PMH intellectual disability, with R hip and knee pain. His last admission Nov 2017 with same complain, no active issues found, (+) fever. Pt is a poor historian and just complains of hip pain due to a fall. CT Pelvis 1/27: Neg. fx/ dislocation. Mild right and moderate to severe left hip joint OA.

## 2018-01-30 NOTE — DISCHARGE NOTE ADULT - CARE PROVIDERS DIRECT ADDRESSES
,DirectAddress_Unknown,[email or web address]

## 2018-01-30 NOTE — PHYSICAL THERAPY INITIAL EVALUATION ADULT - ADDITIONAL COMMENTS
Pt admitted from rehab. History provided by BEVERLEY Carmichael: Prior to rehab admit (~2 months ago) pt lived in group home, was able to ambulate short distances. Pt used a "built up shoe" to ambulate which has since been lost.

## 2018-01-30 NOTE — DISCHARGE NOTE ADULT - MEDICATION SUMMARY - MEDICATIONS TO TAKE
I will START or STAY ON the medications listed below when I get home from the hospital:    Calcium 500+D3 200  -- 1 tab(s) by mouth once a day  -- Indication: For supplement     oxyCODONE-acetaminophen 5 mg-325 mg oral tablet  -- 1 tab(s) by mouth every 6 hours, As needed, moderate to severe pain  -- Indication: For Pain of right lower extremity    tamsulosin 0.4 mg oral capsule  -- 1 cap(s) by mouth once a day (at bedtime)  -- Indication: For BPH    Depakene 250 mg/5 mL oral syrup  -- 20 milliliter(s) by mouth 2 times a day  -- Indication: For seizures    LORazepam 0.5 mg oral tablet  -- 1 tab(s) by mouth once a day  -- Indication: For seizures    citalopram 40 mg oral tablet  -- 1 tab(s) by mouth once a day  -- Indication: For depression     benztropine 1 mg oral tablet  -- 1 tab(s) by mouth 2 times a day  -- Indication: For Mood disorder    ARIPiprazole 20 mg oral tablet  -- 1 tab(s) by mouth once a day  -- Indication: For agitation     acetaZOLAMIDE 500 mg oral capsule, extended release  -- 1 cap(s) by mouth 2 times a day  -- Indication: For water pill    guaiFENesin 100 mg/5 mL oral liquid  -- 5 milliliter(s) by mouth every 6 hours, As needed, Cough  -- Indication: For cough    polyethylene glycol 3350 oral powder for reconstitution  -- 17 gram(s) by mouth once a day  -- Indication: For constipation     bisacodyl 5 mg oral delayed release tablet  -- 2 tab(s) by mouth once a day (at bedtime), As needed, Constipation  -- Indication: For constipation     docusate sodium 100 mg oral capsule  -- 1 cap(s) by mouth 3 times a day  -- Indication: For stool softner    Senna 8.6 mg oral tablet  -- 2 tab(s) by mouth once a day (at bedtime)  -- Indication: For constipation     Urecholine 25 mg oral tablet  -- 1 tab(s) by mouth 3 times a day  -- Indication: For  agent     melatonin 3 mg oral tablet  -- 1 tab(s) by mouth once a day (at bedtime), As Needed - for bladder spasms  -- Indication: For bladder spasm    Melatonin 5 mg oral tablet  -- 1 tab(s) by mouth once a day (at bedtime)  -- Indication: For Insomnia    brimonidine 0.2% ophthalmic solution  -- 1 drop(s) in each eye 3 times a day  -- Indication: For glaucoma    latanoprost 0.005% ophthalmic solution  -- 1 drop(s) in each eye once a day (in the evening)  -- Indication: For glaucoma    timolol maleate 0.5% ophthalmic solution  -- 1 drop(s) to each affected eye 2 times a day  -- Indication: For glaucoma    Multiple Vitamins oral tablet  -- 1 tab(s) by mouth once a day  -- Indication: For vitamin supplement     Vitamin D3 1000 intl units oral tablet  -- 1 tab(s) by mouth once a day  -- Indication: For vitamin supplement

## 2018-01-31 LAB
CULTURE RESULTS: SIGNIFICANT CHANGE UP
CULTURE RESULTS: SIGNIFICANT CHANGE UP
SPECIMEN SOURCE: SIGNIFICANT CHANGE UP
SPECIMEN SOURCE: SIGNIFICANT CHANGE UP

## 2018-01-31 RX ADMIN — Medication 1 MILLIGRAM(S): at 17:55

## 2018-01-31 RX ADMIN — Medication 1000 MILLIGRAM(S): at 17:55

## 2018-01-31 RX ADMIN — LATANOPROST 1 DROP(S): 0.05 SOLUTION/ DROPS OPHTHALMIC; TOPICAL at 22:20

## 2018-01-31 RX ADMIN — Medication 0.5 MILLIGRAM(S): at 22:21

## 2018-01-31 RX ADMIN — Medication 1 MILLIGRAM(S): at 06:39

## 2018-01-31 RX ADMIN — POLYETHYLENE GLYCOL 3350 17 GRAM(S): 17 POWDER, FOR SOLUTION ORAL at 13:14

## 2018-01-31 RX ADMIN — Medication 1 DROP(S): at 17:55

## 2018-01-31 RX ADMIN — Medication 1 TABLET(S): at 13:14

## 2018-01-31 RX ADMIN — OXYCODONE AND ACETAMINOPHEN 2 TABLET(S): 5; 325 TABLET ORAL at 13:22

## 2018-01-31 RX ADMIN — BRIMONIDINE TARTRATE 1 DROP(S): 2 SOLUTION/ DROPS OPHTHALMIC at 22:20

## 2018-01-31 RX ADMIN — Medication 1 DROP(S): at 06:40

## 2018-01-31 RX ADMIN — Medication 25 MILLIGRAM(S): at 06:39

## 2018-01-31 RX ADMIN — OXYCODONE AND ACETAMINOPHEN 2 TABLET(S): 5; 325 TABLET ORAL at 12:06

## 2018-01-31 RX ADMIN — Medication 75 MILLIGRAM(S): at 07:20

## 2018-01-31 RX ADMIN — BRIMONIDINE TARTRATE 1 DROP(S): 2 SOLUTION/ DROPS OPHTHALMIC at 13:12

## 2018-01-31 RX ADMIN — Medication 1000 MILLIGRAM(S): at 06:39

## 2018-01-31 RX ADMIN — ACETAZOLAMIDE 500 MILLIGRAM(S): 250 TABLET ORAL at 17:55

## 2018-01-31 RX ADMIN — Medication 25 MILLIGRAM(S): at 13:13

## 2018-01-31 RX ADMIN — CITALOPRAM 40 MILLIGRAM(S): 10 TABLET, FILM COATED ORAL at 13:14

## 2018-01-31 RX ADMIN — ARIPIPRAZOLE 20 MILLIGRAM(S): 15 TABLET ORAL at 13:12

## 2018-01-31 RX ADMIN — Medication 100 MILLIGRAM(S): at 12:02

## 2018-01-31 RX ADMIN — TAMSULOSIN HYDROCHLORIDE 0.4 MILLIGRAM(S): 0.4 CAPSULE ORAL at 22:21

## 2018-01-31 RX ADMIN — Medication 25 MILLIGRAM(S): at 22:21

## 2018-01-31 RX ADMIN — BRIMONIDINE TARTRATE 1 DROP(S): 2 SOLUTION/ DROPS OPHTHALMIC at 06:39

## 2018-01-31 RX ADMIN — ACETAZOLAMIDE 500 MILLIGRAM(S): 250 TABLET ORAL at 06:39

## 2018-02-01 LAB
ANION GAP SERPL CALC-SCNC: 9 MMOL/L — SIGNIFICANT CHANGE UP (ref 5–17)
BUN SERPL-MCNC: 14 MG/DL — SIGNIFICANT CHANGE UP (ref 7–23)
CALCIUM SERPL-MCNC: 8.9 MG/DL — SIGNIFICANT CHANGE UP (ref 8.4–10.5)
CHLORIDE SERPL-SCNC: 109 MMOL/L — HIGH (ref 96–108)
CO2 SERPL-SCNC: 25 MMOL/L — SIGNIFICANT CHANGE UP (ref 22–31)
CREAT SERPL-MCNC: 0.83 MG/DL — SIGNIFICANT CHANGE UP (ref 0.5–1.3)
GLUCOSE SERPL-MCNC: 86 MG/DL — SIGNIFICANT CHANGE UP (ref 70–99)
HCT VFR BLD CALC: 42.8 % — SIGNIFICANT CHANGE UP (ref 39–50)
HGB BLD-MCNC: 14.2 G/DL — SIGNIFICANT CHANGE UP (ref 13–17)
MCHC RBC-ENTMCNC: 33.2 GM/DL — SIGNIFICANT CHANGE UP (ref 32–36)
MCHC RBC-ENTMCNC: 34.2 PG — HIGH (ref 27–34)
MCV RBC AUTO: 103 FL — HIGH (ref 80–100)
PLATELET # BLD AUTO: 74 K/UL — LOW (ref 150–400)
POTASSIUM SERPL-MCNC: 4.4 MMOL/L — SIGNIFICANT CHANGE UP (ref 3.5–5.3)
POTASSIUM SERPL-SCNC: 4.4 MMOL/L — SIGNIFICANT CHANGE UP (ref 3.5–5.3)
RBC # BLD: 4.15 M/UL — LOW (ref 4.2–5.8)
RBC # FLD: 12.3 % — SIGNIFICANT CHANGE UP (ref 10.3–14.5)
SODIUM SERPL-SCNC: 143 MMOL/L — SIGNIFICANT CHANGE UP (ref 135–145)
WBC # BLD: 6.3 K/UL — SIGNIFICANT CHANGE UP (ref 3.8–10.5)
WBC # FLD AUTO: 6.3 K/UL — SIGNIFICANT CHANGE UP (ref 3.8–10.5)

## 2018-02-01 RX ADMIN — ACETAZOLAMIDE 500 MILLIGRAM(S): 250 TABLET ORAL at 06:17

## 2018-02-01 RX ADMIN — ARIPIPRAZOLE 20 MILLIGRAM(S): 15 TABLET ORAL at 13:02

## 2018-02-01 RX ADMIN — Medication 100 MILLIGRAM(S): at 17:31

## 2018-02-01 RX ADMIN — BRIMONIDINE TARTRATE 1 DROP(S): 2 SOLUTION/ DROPS OPHTHALMIC at 06:19

## 2018-02-01 RX ADMIN — ACETAZOLAMIDE 500 MILLIGRAM(S): 250 TABLET ORAL at 17:26

## 2018-02-01 RX ADMIN — Medication 1 TABLET(S): at 13:03

## 2018-02-01 RX ADMIN — Medication 25 MILLIGRAM(S): at 21:47

## 2018-02-01 RX ADMIN — Medication 1000 MILLIGRAM(S): at 06:19

## 2018-02-01 RX ADMIN — POLYETHYLENE GLYCOL 3350 17 GRAM(S): 17 POWDER, FOR SOLUTION ORAL at 13:02

## 2018-02-01 RX ADMIN — Medication 1 DROP(S): at 17:26

## 2018-02-01 RX ADMIN — Medication 25 MILLIGRAM(S): at 06:18

## 2018-02-01 RX ADMIN — Medication 1000 MILLIGRAM(S): at 17:26

## 2018-02-01 RX ADMIN — BRIMONIDINE TARTRATE 1 DROP(S): 2 SOLUTION/ DROPS OPHTHALMIC at 21:48

## 2018-02-01 RX ADMIN — Medication 25 MILLIGRAM(S): at 13:02

## 2018-02-01 RX ADMIN — TAMSULOSIN HYDROCHLORIDE 0.4 MILLIGRAM(S): 0.4 CAPSULE ORAL at 21:47

## 2018-02-01 RX ADMIN — BRIMONIDINE TARTRATE 1 DROP(S): 2 SOLUTION/ DROPS OPHTHALMIC at 13:09

## 2018-02-01 RX ADMIN — Medication 1 MILLIGRAM(S): at 06:17

## 2018-02-01 RX ADMIN — Medication 1 DROP(S): at 06:19

## 2018-02-01 RX ADMIN — Medication 0.5 MILLIGRAM(S): at 21:47

## 2018-02-01 RX ADMIN — Medication 1 MILLIGRAM(S): at 17:26

## 2018-02-01 RX ADMIN — LATANOPROST 1 DROP(S): 0.05 SOLUTION/ DROPS OPHTHALMIC; TOPICAL at 21:48

## 2018-02-01 RX ADMIN — CITALOPRAM 40 MILLIGRAM(S): 10 TABLET, FILM COATED ORAL at 13:02

## 2018-02-02 RX ADMIN — BRIMONIDINE TARTRATE 1 DROP(S): 2 SOLUTION/ DROPS OPHTHALMIC at 22:32

## 2018-02-02 RX ADMIN — LATANOPROST 1 DROP(S): 0.05 SOLUTION/ DROPS OPHTHALMIC; TOPICAL at 22:32

## 2018-02-02 RX ADMIN — POLYETHYLENE GLYCOL 3350 17 GRAM(S): 17 POWDER, FOR SOLUTION ORAL at 13:26

## 2018-02-02 RX ADMIN — Medication 25 MILLIGRAM(S): at 06:19

## 2018-02-02 RX ADMIN — Medication 1000 MILLIGRAM(S): at 06:20

## 2018-02-02 RX ADMIN — Medication 25 MILLIGRAM(S): at 13:25

## 2018-02-02 RX ADMIN — Medication 100 MILLIGRAM(S): at 13:27

## 2018-02-02 RX ADMIN — CITALOPRAM 40 MILLIGRAM(S): 10 TABLET, FILM COATED ORAL at 13:25

## 2018-02-02 RX ADMIN — TAMSULOSIN HYDROCHLORIDE 0.4 MILLIGRAM(S): 0.4 CAPSULE ORAL at 22:32

## 2018-02-02 RX ADMIN — ACETAZOLAMIDE 500 MILLIGRAM(S): 250 TABLET ORAL at 17:20

## 2018-02-02 RX ADMIN — Medication 1000 MILLIGRAM(S): at 17:21

## 2018-02-02 RX ADMIN — Medication 1 TABLET(S): at 13:26

## 2018-02-02 RX ADMIN — Medication 1 MILLIGRAM(S): at 06:19

## 2018-02-02 RX ADMIN — Medication 1 DROP(S): at 17:20

## 2018-02-02 RX ADMIN — ARIPIPRAZOLE 20 MILLIGRAM(S): 15 TABLET ORAL at 13:25

## 2018-02-02 RX ADMIN — Medication 0.5 MILLIGRAM(S): at 22:32

## 2018-02-02 RX ADMIN — Medication 1 DROP(S): at 06:21

## 2018-02-02 RX ADMIN — ACETAZOLAMIDE 500 MILLIGRAM(S): 250 TABLET ORAL at 06:19

## 2018-02-02 RX ADMIN — Medication 25 MILLIGRAM(S): at 22:32

## 2018-02-02 RX ADMIN — Medication 1 MILLIGRAM(S): at 17:20

## 2018-02-02 RX ADMIN — BRIMONIDINE TARTRATE 1 DROP(S): 2 SOLUTION/ DROPS OPHTHALMIC at 13:26

## 2018-02-02 RX ADMIN — BRIMONIDINE TARTRATE 1 DROP(S): 2 SOLUTION/ DROPS OPHTHALMIC at 06:21

## 2018-02-02 NOTE — DIETITIAN INITIAL EVALUATION ADULT. - ENERGY NEEDS
ht:6' , wt: 137.5pounds, BMI:18.6 kg/m2, IBW:178 pounds +/- 10%     Pt admitted c viral PNA, +influenza, S/P Tamiflu; right leg pain (history of fracture), mental retardation, admitted from group home; wheelchair bound.

## 2018-02-02 NOTE — DIETITIAN INITIAL EVALUATION ADULT. - OTHER INFO
Pt seen for LOS and low BMI. Noted as per previous RD note from November 2017 Pt weighed about 134 pounds consistent c wt during this admission, indicating wt has been stable. Pt unable to give nutrition history at this time. Noted Pt was on multivitamin PTA as per H&P. Noted per flow sheets, PO intake has been variable. Questionable PO intake PTA.

## 2018-02-02 NOTE — DIETITIAN INITIAL EVALUATION ADULT. - NS AS NUTRI INTERV MEALS SNACK
Given Pt request, would recommend to continue c regular diet. RD to provide food preferences- crackers and tuna salad as Pt prefers these foods. Noted Pt c mild muscle loss, given lack of information about how Pt was doing PTA, would hold off on diagnosis of malnutrition at this time-would continue to monitor and follow up as needed and obtain further information if/when any  caregiver available to provide this information.

## 2018-02-02 NOTE — DIETITIAN INITIAL EVALUATION ADULT. - PHYSICAL APPEARANCE
Pt agreeable to nutrition focused physical exam, Pt a mild muscle loss around temples, shoulders and clavicles, good fat stores around buccal, orbital and tricep region

## 2018-02-02 NOTE — DIETITIAN INITIAL EVALUATION ADULT. - FACTORS AFF FOOD INTAKE
Pt reports he likes [employer]o crackers and tuna salad, currently trying to eat some pasta c sauce; Pt was very clear he did not want softer foods, stating "soft food doesn't taste good;" noted Pt is missing some teeth, as per PCA, Pt has been able to eat most of his meals as long as he likes it- likes tuna sandwich and crackers

## 2018-02-02 NOTE — DIETITIAN INITIAL EVALUATION ADULT. - PT NOT SOURCE
Pt c mild intellectual disorder, unable to answer all of RDs questions, disoriented to time per chart

## 2018-02-03 LAB
ANION GAP SERPL CALC-SCNC: 14 MMOL/L — SIGNIFICANT CHANGE UP (ref 5–17)
BASOPHILS # BLD AUTO: 0.01 K/UL — SIGNIFICANT CHANGE UP (ref 0–0.2)
BASOPHILS NFR BLD AUTO: 0.2 % — SIGNIFICANT CHANGE UP (ref 0–2)
BUN SERPL-MCNC: 17 MG/DL — SIGNIFICANT CHANGE UP (ref 7–23)
CALCIUM SERPL-MCNC: 9.2 MG/DL — SIGNIFICANT CHANGE UP (ref 8.4–10.5)
CHLORIDE SERPL-SCNC: 108 MMOL/L — SIGNIFICANT CHANGE UP (ref 96–108)
CO2 SERPL-SCNC: 23 MMOL/L — SIGNIFICANT CHANGE UP (ref 22–31)
CREAT SERPL-MCNC: 0.66 MG/DL — SIGNIFICANT CHANGE UP (ref 0.5–1.3)
EOSINOPHIL # BLD AUTO: 0.17 K/UL — SIGNIFICANT CHANGE UP (ref 0–0.5)
EOSINOPHIL NFR BLD AUTO: 3.2 % — SIGNIFICANT CHANGE UP (ref 0–6)
GLUCOSE SERPL-MCNC: 85 MG/DL — SIGNIFICANT CHANGE UP (ref 70–99)
HCT VFR BLD CALC: 40 % — SIGNIFICANT CHANGE UP (ref 39–50)
HGB BLD-MCNC: 13.3 G/DL — SIGNIFICANT CHANGE UP (ref 13–17)
IMM GRANULOCYTES NFR BLD AUTO: 0.6 % — SIGNIFICANT CHANGE UP (ref 0–1.5)
LYMPHOCYTES # BLD AUTO: 2.24 K/UL — SIGNIFICANT CHANGE UP (ref 1–3.3)
LYMPHOCYTES # BLD AUTO: 41.6 % — SIGNIFICANT CHANGE UP (ref 13–44)
MCHC RBC-ENTMCNC: 32.2 PG — SIGNIFICANT CHANGE UP (ref 27–34)
MCHC RBC-ENTMCNC: 33.3 GM/DL — SIGNIFICANT CHANGE UP (ref 32–36)
MCV RBC AUTO: 96.9 FL — SIGNIFICANT CHANGE UP (ref 80–100)
MONOCYTES # BLD AUTO: 0.42 K/UL — SIGNIFICANT CHANGE UP (ref 0–0.9)
MONOCYTES NFR BLD AUTO: 7.8 % — SIGNIFICANT CHANGE UP (ref 2–14)
NEUTROPHILS # BLD AUTO: 2.52 K/UL — SIGNIFICANT CHANGE UP (ref 1.8–7.4)
NEUTROPHILS NFR BLD AUTO: 46.6 % — SIGNIFICANT CHANGE UP (ref 43–77)
PLATELET # BLD AUTO: 111 K/UL — LOW (ref 150–400)
POTASSIUM SERPL-MCNC: 3.9 MMOL/L — SIGNIFICANT CHANGE UP (ref 3.5–5.3)
POTASSIUM SERPL-SCNC: 3.9 MMOL/L — SIGNIFICANT CHANGE UP (ref 3.5–5.3)
RBC # BLD: 4.13 M/UL — LOW (ref 4.2–5.8)
RBC # FLD: 13.1 % — SIGNIFICANT CHANGE UP (ref 10.3–14.5)
SODIUM SERPL-SCNC: 145 MMOL/L — SIGNIFICANT CHANGE UP (ref 135–145)
WBC # BLD: 5.39 K/UL — SIGNIFICANT CHANGE UP (ref 3.8–10.5)
WBC # FLD AUTO: 5.39 K/UL — SIGNIFICANT CHANGE UP (ref 3.8–10.5)

## 2018-02-03 PROCEDURE — 74018 RADEX ABDOMEN 1 VIEW: CPT | Mod: 26

## 2018-02-03 RX ADMIN — Medication 1000 MILLIGRAM(S): at 18:19

## 2018-02-03 RX ADMIN — BRIMONIDINE TARTRATE 1 DROP(S): 2 SOLUTION/ DROPS OPHTHALMIC at 05:26

## 2018-02-03 RX ADMIN — Medication 1 MILLIGRAM(S): at 05:26

## 2018-02-03 RX ADMIN — BRIMONIDINE TARTRATE 1 DROP(S): 2 SOLUTION/ DROPS OPHTHALMIC at 13:06

## 2018-02-03 RX ADMIN — ARIPIPRAZOLE 20 MILLIGRAM(S): 15 TABLET ORAL at 13:04

## 2018-02-03 RX ADMIN — Medication 1 DROP(S): at 05:26

## 2018-02-03 RX ADMIN — ACETAZOLAMIDE 500 MILLIGRAM(S): 250 TABLET ORAL at 18:20

## 2018-02-03 RX ADMIN — Medication 25 MILLIGRAM(S): at 22:03

## 2018-02-03 RX ADMIN — BRIMONIDINE TARTRATE 1 DROP(S): 2 SOLUTION/ DROPS OPHTHALMIC at 22:02

## 2018-02-03 RX ADMIN — ACETAZOLAMIDE 500 MILLIGRAM(S): 250 TABLET ORAL at 05:26

## 2018-02-03 RX ADMIN — Medication 1 TABLET(S): at 13:07

## 2018-02-03 RX ADMIN — Medication 1000 MILLIGRAM(S): at 05:25

## 2018-02-03 RX ADMIN — Medication 100 MILLIGRAM(S): at 22:12

## 2018-02-03 RX ADMIN — LATANOPROST 1 DROP(S): 0.05 SOLUTION/ DROPS OPHTHALMIC; TOPICAL at 22:03

## 2018-02-03 RX ADMIN — Medication 100 MILLIGRAM(S): at 13:07

## 2018-02-03 RX ADMIN — Medication 1 DROP(S): at 18:20

## 2018-02-03 RX ADMIN — CITALOPRAM 40 MILLIGRAM(S): 10 TABLET, FILM COATED ORAL at 13:05

## 2018-02-03 RX ADMIN — Medication 25 MILLIGRAM(S): at 13:06

## 2018-02-03 RX ADMIN — TAMSULOSIN HYDROCHLORIDE 0.4 MILLIGRAM(S): 0.4 CAPSULE ORAL at 22:03

## 2018-02-03 RX ADMIN — Medication 25 MILLIGRAM(S): at 05:25

## 2018-02-03 RX ADMIN — Medication 1 MILLIGRAM(S): at 18:21

## 2018-02-04 RX ORDER — PSYLLIUM SEED (WITH DEXTROSE)
1 POWDER (GRAM) ORAL DAILY
Qty: 0 | Refills: 0 | Status: DISCONTINUED | OUTPATIENT
Start: 2018-02-04 | End: 2018-02-04

## 2018-02-04 RX ADMIN — Medication 1000 MILLIGRAM(S): at 18:11

## 2018-02-04 RX ADMIN — Medication 100 MILLIGRAM(S): at 22:02

## 2018-02-04 RX ADMIN — Medication 25 MILLIGRAM(S): at 21:44

## 2018-02-04 RX ADMIN — BRIMONIDINE TARTRATE 1 DROP(S): 2 SOLUTION/ DROPS OPHTHALMIC at 05:44

## 2018-02-04 RX ADMIN — Medication 1 DROP(S): at 05:44

## 2018-02-04 RX ADMIN — ARIPIPRAZOLE 20 MILLIGRAM(S): 15 TABLET ORAL at 11:53

## 2018-02-04 RX ADMIN — Medication 1 DROP(S): at 18:11

## 2018-02-04 RX ADMIN — Medication 1 MILLIGRAM(S): at 05:44

## 2018-02-04 RX ADMIN — Medication 1 MILLIGRAM(S): at 18:11

## 2018-02-04 RX ADMIN — CITALOPRAM 40 MILLIGRAM(S): 10 TABLET, FILM COATED ORAL at 11:53

## 2018-02-04 RX ADMIN — Medication 1 TABLET(S): at 11:53

## 2018-02-04 RX ADMIN — BRIMONIDINE TARTRATE 1 DROP(S): 2 SOLUTION/ DROPS OPHTHALMIC at 13:07

## 2018-02-04 RX ADMIN — TAMSULOSIN HYDROCHLORIDE 0.4 MILLIGRAM(S): 0.4 CAPSULE ORAL at 21:45

## 2018-02-04 RX ADMIN — Medication 25 MILLIGRAM(S): at 05:44

## 2018-02-04 RX ADMIN — ACETAZOLAMIDE 500 MILLIGRAM(S): 250 TABLET ORAL at 18:11

## 2018-02-04 RX ADMIN — BRIMONIDINE TARTRATE 1 DROP(S): 2 SOLUTION/ DROPS OPHTHALMIC at 21:44

## 2018-02-04 RX ADMIN — Medication 25 MILLIGRAM(S): at 13:07

## 2018-02-04 RX ADMIN — LATANOPROST 1 DROP(S): 0.05 SOLUTION/ DROPS OPHTHALMIC; TOPICAL at 21:44

## 2018-02-04 RX ADMIN — ACETAZOLAMIDE 500 MILLIGRAM(S): 250 TABLET ORAL at 05:44

## 2018-02-04 RX ADMIN — Medication 100 MILLIGRAM(S): at 05:44

## 2018-02-04 RX ADMIN — Medication 1000 MILLIGRAM(S): at 05:44

## 2018-02-05 LAB
ANION GAP SERPL CALC-SCNC: 11 MMOL/L — SIGNIFICANT CHANGE UP (ref 5–17)
BUN SERPL-MCNC: 19 MG/DL — SIGNIFICANT CHANGE UP (ref 7–23)
CALCIUM SERPL-MCNC: 9 MG/DL — SIGNIFICANT CHANGE UP (ref 8.4–10.5)
CHLORIDE SERPL-SCNC: 111 MMOL/L — HIGH (ref 96–108)
CO2 SERPL-SCNC: 24 MMOL/L — SIGNIFICANT CHANGE UP (ref 22–31)
CREAT SERPL-MCNC: 0.74 MG/DL — SIGNIFICANT CHANGE UP (ref 0.5–1.3)
GLUCOSE SERPL-MCNC: 78 MG/DL — SIGNIFICANT CHANGE UP (ref 70–99)
HCT VFR BLD CALC: 41.7 % — SIGNIFICANT CHANGE UP (ref 39–50)
HGB BLD-MCNC: 13.3 G/DL — SIGNIFICANT CHANGE UP (ref 13–17)
MCHC RBC-ENTMCNC: 31.7 PG — SIGNIFICANT CHANGE UP (ref 27–34)
MCHC RBC-ENTMCNC: 31.9 GM/DL — LOW (ref 32–36)
MCV RBC AUTO: 99.5 FL — SIGNIFICANT CHANGE UP (ref 80–100)
PLATELET # BLD AUTO: 109 K/UL — LOW (ref 150–400)
POTASSIUM SERPL-MCNC: 3.6 MMOL/L — SIGNIFICANT CHANGE UP (ref 3.5–5.3)
POTASSIUM SERPL-SCNC: 3.6 MMOL/L — SIGNIFICANT CHANGE UP (ref 3.5–5.3)
RBC # BLD: 4.19 M/UL — LOW (ref 4.2–5.8)
RBC # FLD: 13.4 % — SIGNIFICANT CHANGE UP (ref 10.3–14.5)
SODIUM SERPL-SCNC: 146 MMOL/L — HIGH (ref 135–145)
WBC # BLD: 6.25 K/UL — SIGNIFICANT CHANGE UP (ref 3.8–10.5)
WBC # FLD AUTO: 6.25 K/UL — SIGNIFICANT CHANGE UP (ref 3.8–10.5)

## 2018-02-05 RX ORDER — ACETAMINOPHEN 500 MG
650 TABLET ORAL ONCE
Qty: 0 | Refills: 0 | Status: COMPLETED | OUTPATIENT
Start: 2018-02-05 | End: 2018-02-05

## 2018-02-05 RX ADMIN — Medication 650 MILLIGRAM(S): at 23:49

## 2018-02-05 RX ADMIN — Medication 650 MILLIGRAM(S): at 22:45

## 2018-02-05 RX ADMIN — Medication 25 MILLIGRAM(S): at 13:02

## 2018-02-05 RX ADMIN — ACETAZOLAMIDE 500 MILLIGRAM(S): 250 TABLET ORAL at 18:18

## 2018-02-05 RX ADMIN — LATANOPROST 1 DROP(S): 0.05 SOLUTION/ DROPS OPHTHALMIC; TOPICAL at 22:08

## 2018-02-05 RX ADMIN — BRIMONIDINE TARTRATE 1 DROP(S): 2 SOLUTION/ DROPS OPHTHALMIC at 05:20

## 2018-02-05 RX ADMIN — Medication 25 MILLIGRAM(S): at 05:20

## 2018-02-05 RX ADMIN — Medication 1 MILLIGRAM(S): at 18:18

## 2018-02-05 RX ADMIN — Medication 25 MILLIGRAM(S): at 22:08

## 2018-02-05 RX ADMIN — Medication 100 MILLIGRAM(S): at 13:04

## 2018-02-05 RX ADMIN — Medication 1 DROP(S): at 18:18

## 2018-02-05 RX ADMIN — ACETAZOLAMIDE 500 MILLIGRAM(S): 250 TABLET ORAL at 05:20

## 2018-02-05 RX ADMIN — BRIMONIDINE TARTRATE 1 DROP(S): 2 SOLUTION/ DROPS OPHTHALMIC at 13:04

## 2018-02-05 RX ADMIN — Medication 1000 MILLIGRAM(S): at 18:18

## 2018-02-05 RX ADMIN — CITALOPRAM 40 MILLIGRAM(S): 10 TABLET, FILM COATED ORAL at 13:04

## 2018-02-05 RX ADMIN — Medication 1 MILLIGRAM(S): at 05:20

## 2018-02-05 RX ADMIN — Medication 1000 MILLIGRAM(S): at 05:20

## 2018-02-05 RX ADMIN — POLYETHYLENE GLYCOL 3350 17 GRAM(S): 17 POWDER, FOR SOLUTION ORAL at 13:02

## 2018-02-05 RX ADMIN — Medication 1 DROP(S): at 05:25

## 2018-02-05 RX ADMIN — Medication 100 MILLIGRAM(S): at 05:19

## 2018-02-05 RX ADMIN — TAMSULOSIN HYDROCHLORIDE 0.4 MILLIGRAM(S): 0.4 CAPSULE ORAL at 22:08

## 2018-02-05 RX ADMIN — BRIMONIDINE TARTRATE 1 DROP(S): 2 SOLUTION/ DROPS OPHTHALMIC at 22:08

## 2018-02-05 RX ADMIN — Medication 1 TABLET(S): at 13:04

## 2018-02-05 RX ADMIN — ARIPIPRAZOLE 20 MILLIGRAM(S): 15 TABLET ORAL at 13:02

## 2018-02-05 NOTE — PROVIDER CONTACT NOTE (OTHER) - ACTION/TREATMENT ORDERED:
Per provider, continue to monitor pt. Offer Robitussin for frequent cough. Per provider, continue to monitor pt. Offer Robitussin for frequent cough. If pt vomits again, will reassess.

## 2018-02-06 LAB
ANION GAP SERPL CALC-SCNC: 12 MMOL/L — SIGNIFICANT CHANGE UP (ref 5–17)
BUN SERPL-MCNC: 20 MG/DL — SIGNIFICANT CHANGE UP (ref 7–23)
CALCIUM SERPL-MCNC: 8.7 MG/DL — SIGNIFICANT CHANGE UP (ref 8.4–10.5)
CHLORIDE SERPL-SCNC: 108 MMOL/L — SIGNIFICANT CHANGE UP (ref 96–108)
CO2 SERPL-SCNC: 24 MMOL/L — SIGNIFICANT CHANGE UP (ref 22–31)
CREAT SERPL-MCNC: 0.66 MG/DL — SIGNIFICANT CHANGE UP (ref 0.5–1.3)
GLUCOSE SERPL-MCNC: 95 MG/DL — SIGNIFICANT CHANGE UP (ref 70–99)
POTASSIUM SERPL-MCNC: 4.2 MMOL/L — SIGNIFICANT CHANGE UP (ref 3.5–5.3)
POTASSIUM SERPL-SCNC: 4.2 MMOL/L — SIGNIFICANT CHANGE UP (ref 3.5–5.3)
SODIUM SERPL-SCNC: 144 MMOL/L — SIGNIFICANT CHANGE UP (ref 135–145)

## 2018-02-06 RX ADMIN — BRIMONIDINE TARTRATE 1 DROP(S): 2 SOLUTION/ DROPS OPHTHALMIC at 06:25

## 2018-02-06 RX ADMIN — Medication 100 MILLIGRAM(S): at 17:35

## 2018-02-06 RX ADMIN — Medication 100 MILLIGRAM(S): at 12:06

## 2018-02-06 RX ADMIN — Medication 25 MILLIGRAM(S): at 06:26

## 2018-02-06 RX ADMIN — TAMSULOSIN HYDROCHLORIDE 0.4 MILLIGRAM(S): 0.4 CAPSULE ORAL at 23:45

## 2018-02-06 RX ADMIN — Medication 1 MILLIGRAM(S): at 06:26

## 2018-02-06 RX ADMIN — BRIMONIDINE TARTRATE 1 DROP(S): 2 SOLUTION/ DROPS OPHTHALMIC at 23:45

## 2018-02-06 RX ADMIN — Medication 1 DROP(S): at 17:35

## 2018-02-06 RX ADMIN — Medication 100 MILLIGRAM(S): at 06:28

## 2018-02-06 RX ADMIN — ARIPIPRAZOLE 20 MILLIGRAM(S): 15 TABLET ORAL at 12:06

## 2018-02-06 RX ADMIN — POLYETHYLENE GLYCOL 3350 17 GRAM(S): 17 POWDER, FOR SOLUTION ORAL at 12:06

## 2018-02-06 RX ADMIN — Medication 100 MILLIGRAM(S): at 23:44

## 2018-02-06 RX ADMIN — Medication 1 MILLIGRAM(S): at 17:35

## 2018-02-06 RX ADMIN — Medication 25 MILLIGRAM(S): at 23:45

## 2018-02-06 RX ADMIN — Medication 1 TABLET(S): at 12:06

## 2018-02-06 RX ADMIN — Medication 1000 MILLIGRAM(S): at 17:35

## 2018-02-06 RX ADMIN — ACETAZOLAMIDE 500 MILLIGRAM(S): 250 TABLET ORAL at 06:25

## 2018-02-06 RX ADMIN — CITALOPRAM 40 MILLIGRAM(S): 10 TABLET, FILM COATED ORAL at 12:06

## 2018-02-06 RX ADMIN — ACETAZOLAMIDE 500 MILLIGRAM(S): 250 TABLET ORAL at 17:35

## 2018-02-06 RX ADMIN — BRIMONIDINE TARTRATE 1 DROP(S): 2 SOLUTION/ DROPS OPHTHALMIC at 13:05

## 2018-02-06 RX ADMIN — LATANOPROST 1 DROP(S): 0.05 SOLUTION/ DROPS OPHTHALMIC; TOPICAL at 23:46

## 2018-02-06 RX ADMIN — Medication 25 MILLIGRAM(S): at 13:05

## 2018-02-06 RX ADMIN — Medication 1 DROP(S): at 06:25

## 2018-02-06 RX ADMIN — Medication 1000 MILLIGRAM(S): at 06:28

## 2018-02-07 RX ADMIN — Medication 100 MILLIGRAM(S): at 12:51

## 2018-02-07 RX ADMIN — Medication 25 MILLIGRAM(S): at 21:20

## 2018-02-07 RX ADMIN — Medication 1000 MILLIGRAM(S): at 18:41

## 2018-02-07 RX ADMIN — POLYETHYLENE GLYCOL 3350 17 GRAM(S): 17 POWDER, FOR SOLUTION ORAL at 12:51

## 2018-02-07 RX ADMIN — Medication 100 MILLIGRAM(S): at 18:42

## 2018-02-07 RX ADMIN — BRIMONIDINE TARTRATE 1 DROP(S): 2 SOLUTION/ DROPS OPHTHALMIC at 12:52

## 2018-02-07 RX ADMIN — Medication 1 TABLET(S): at 12:51

## 2018-02-07 RX ADMIN — Medication 1 DROP(S): at 18:42

## 2018-02-07 RX ADMIN — ACETAZOLAMIDE 500 MILLIGRAM(S): 250 TABLET ORAL at 06:24

## 2018-02-07 RX ADMIN — Medication 1 DROP(S): at 06:23

## 2018-02-07 RX ADMIN — TAMSULOSIN HYDROCHLORIDE 0.4 MILLIGRAM(S): 0.4 CAPSULE ORAL at 21:20

## 2018-02-07 RX ADMIN — BRIMONIDINE TARTRATE 1 DROP(S): 2 SOLUTION/ DROPS OPHTHALMIC at 21:20

## 2018-02-07 RX ADMIN — Medication 100 MILLIGRAM(S): at 06:21

## 2018-02-07 RX ADMIN — Medication 1 MILLIGRAM(S): at 18:42

## 2018-02-07 RX ADMIN — Medication 1000 MILLIGRAM(S): at 06:23

## 2018-02-07 RX ADMIN — Medication 1 MILLIGRAM(S): at 06:20

## 2018-02-07 RX ADMIN — ARIPIPRAZOLE 20 MILLIGRAM(S): 15 TABLET ORAL at 12:51

## 2018-02-07 RX ADMIN — Medication 25 MILLIGRAM(S): at 12:49

## 2018-02-07 RX ADMIN — LATANOPROST 1 DROP(S): 0.05 SOLUTION/ DROPS OPHTHALMIC; TOPICAL at 21:20

## 2018-02-07 RX ADMIN — BRIMONIDINE TARTRATE 1 DROP(S): 2 SOLUTION/ DROPS OPHTHALMIC at 06:21

## 2018-02-07 RX ADMIN — CITALOPRAM 40 MILLIGRAM(S): 10 TABLET, FILM COATED ORAL at 12:51

## 2018-02-07 RX ADMIN — Medication 25 MILLIGRAM(S): at 06:20

## 2018-02-08 RX ADMIN — Medication 1 MILLIGRAM(S): at 05:31

## 2018-02-08 RX ADMIN — BRIMONIDINE TARTRATE 1 DROP(S): 2 SOLUTION/ DROPS OPHTHALMIC at 14:20

## 2018-02-08 RX ADMIN — TAMSULOSIN HYDROCHLORIDE 0.4 MILLIGRAM(S): 0.4 CAPSULE ORAL at 22:27

## 2018-02-08 RX ADMIN — Medication 1 TABLET(S): at 12:14

## 2018-02-08 RX ADMIN — ACETAZOLAMIDE 500 MILLIGRAM(S): 250 TABLET ORAL at 20:12

## 2018-02-08 RX ADMIN — CITALOPRAM 40 MILLIGRAM(S): 10 TABLET, FILM COATED ORAL at 12:14

## 2018-02-08 RX ADMIN — Medication 25 MILLIGRAM(S): at 22:26

## 2018-02-08 RX ADMIN — POLYETHYLENE GLYCOL 3350 17 GRAM(S): 17 POWDER, FOR SOLUTION ORAL at 12:14

## 2018-02-08 RX ADMIN — Medication 100 MILLIGRAM(S): at 12:29

## 2018-02-08 RX ADMIN — LATANOPROST 1 DROP(S): 0.05 SOLUTION/ DROPS OPHTHALMIC; TOPICAL at 22:26

## 2018-02-08 RX ADMIN — Medication 100 MILLIGRAM(S): at 00:27

## 2018-02-08 RX ADMIN — BRIMONIDINE TARTRATE 1 DROP(S): 2 SOLUTION/ DROPS OPHTHALMIC at 22:26

## 2018-02-08 RX ADMIN — Medication 1 MILLIGRAM(S): at 20:12

## 2018-02-08 RX ADMIN — Medication 1 DROP(S): at 20:11

## 2018-02-08 RX ADMIN — Medication 1000 MILLIGRAM(S): at 05:30

## 2018-02-08 RX ADMIN — Medication 25 MILLIGRAM(S): at 05:31

## 2018-02-08 RX ADMIN — BRIMONIDINE TARTRATE 1 DROP(S): 2 SOLUTION/ DROPS OPHTHALMIC at 05:31

## 2018-02-08 RX ADMIN — Medication 100 MILLIGRAM(S): at 20:10

## 2018-02-08 RX ADMIN — Medication 1 DROP(S): at 05:30

## 2018-02-08 RX ADMIN — Medication 25 MILLIGRAM(S): at 14:20

## 2018-02-08 RX ADMIN — Medication 100 MILLIGRAM(S): at 05:30

## 2018-02-08 RX ADMIN — Medication 1000 MILLIGRAM(S): at 20:11

## 2018-02-08 RX ADMIN — ARIPIPRAZOLE 20 MILLIGRAM(S): 15 TABLET ORAL at 12:13

## 2018-02-08 NOTE — PROGRESS NOTE ADULT - PROBLEM SELECTOR PLAN 3
supportive care

## 2018-02-08 NOTE — PROGRESS NOTE ADULT - PROBLEM SELECTOR PROBLEM 4
Mood disorder

## 2018-02-08 NOTE — PROGRESS NOTE ADULT - PROBLEM SELECTOR PLAN 2
hx of tibial pleatue Fx  -tylenol  -ortho consult
hx of tibial pleatue Fx  -tylenol  -ortho consult
hx of tibial pleatue Fx  -tylenol
hx of tibial pleatue Fx  -tylenol  -ortho consult
hx of tibial pleatue Fx  -tylenol
hx of tibial pleatue Fx  -tylenol  -ortho consult

## 2018-02-08 NOTE — PROGRESS NOTE ADULT - PROBLEM SELECTOR PROBLEM 5
PTSD (post-traumatic stress disorder)

## 2018-02-08 NOTE — PROGRESS NOTE ADULT - PROBLEM SELECTOR PROBLEM 1
Pneumonia, viral

## 2018-02-08 NOTE — PROGRESS NOTE ADULT - PROVIDER SPECIALTY LIST ADULT
Infectious Disease
Internal Medicine

## 2018-02-08 NOTE — PROGRESS NOTE ADULT - PROBLEM SELECTOR PROBLEM 2
Right leg pain

## 2018-02-08 NOTE — PROGRESS NOTE ADULT - ATTENDING COMMENTS
plan for d/c .  will need assisted living or STR, awaiting placement
plan for d/c . completing tamiflu tomorrow. will need assisted living or STR
plan for d/c .  will need assisted living or STR
plan for d/c .  will need assisted living or STR, awaiting placement
plan for d/c . d/c abx and complete tamiflu as out pt

## 2018-02-08 NOTE — PROGRESS NOTE ADULT - PROBLEM SELECTOR PROBLEM 3
Mental retardation

## 2018-02-08 NOTE — PROGRESS NOTE ADULT - PROBLEM SELECTOR PLAN 1
seen by ID  d/c adriana  c/w rodolfoiflu
seen by ID  d/c adriana  completed tamiflu
seen by ID  d/c adriana  c/w rodolfoiflu
seen by ID  d/c adriana  completed tamiflu
seen by ID  d/c adriana  c/w rodolfoiflu

## 2018-02-08 NOTE — PROGRESS NOTE ADULT - SUBJECTIVE AND OBJECTIVE BOX
Patient is a 62y old  Male who presents with a chief complaint of sob / coughing , fever (25 Jan 2018 23:06)    pt. seen and examined, doing fair, afebrile     INTERVAL HPI/OVERNIGHT EVENTS:  T(C): 36.6 (01-29-18 @ 16:24), Max: 36.8 (01-29-18 @ 13:43)  HR: 60 (01-29-18 @ 16:24) (60 - 66)  BP: 95/54 (01-29-18 @ 16:24) (92/58 - 95/59)  RR: 18 (01-29-18 @ 16:24) (16 - 18)  SpO2: 98% (01-29-18 @ 16:24) (97% - 99%)  Wt(kg): --  I&O's Summary    29 Jan 2018 07:01  -  29 Jan 2018 22:56  --------------------------------------------------------  IN: 1460 mL / OUT: 0 mL / NET: 1460 mL        PAST MEDICAL & SURGICAL HISTORY:  PTSD (post-traumatic stress disorder)  Constipation  Mood disorder  Mental retardation  S/P hip replacement      SOCIAL HISTORY  Alcohol:  Tobacco:  Illicit substance use:    FAMILY HISTORY:    REVIEW OF SYSTEMS:  CONSTITUTIONAL: No fever, weight loss, or fatigue  EYES: No eye pain, visual disturbances, or discharge  ENMT:  No difficulty hearing, tinnitus, vertigo; No sinus or throat pain  NECK: No pain or stiffness  RESPIRATORY: No cough, wheezing, chills or hemoptysis; No shortness of breath  CARDIOVASCULAR: No chest pain, palpitations, dizziness, or leg swelling  GASTROINTESTINAL: No abdominal or epigastric pain. No nausea, vomiting, or hematemesis; No diarrhea or constipation. No melena or hematochezia.  GENITOURINARY: No dysuria, frequency, hematuria, or incontinence  NEUROLOGICAL: No headaches, memory loss, loss of strength, numbness, or tremors  SKIN: No itching, burning, rashes, or lesions   LYMPH NODES: No enlarged glands  ENDOCRINE: No heat or cold intolerance; No hair loss  MUSCULOSKELETAL: No joint pain or swelling; No muscle, back, or extremity pain  PSYCHIATRIC: No depression, anxiety, mood swings, or difficulty sleeping  HEME/LYMPH: No easy bruising, or bleeding gums  ALLERY AND IMMUNOLOGIC: No hives or eczema    RADIOLOGY & ADDITIONAL TESTS:    Imaging Personally Reviewed:  [ ] YES  [ ] NO    Consultant(s) Notes Reviewed:  [ ] YES  [ ] NO    PHYSICAL EXAM:  GENERAL: NAD, well-groomed, well-developed  HEAD:  Atraumatic, Normocephalic  EYES: EOMI, PERRLA, conjunctiva and sclera clear  ENMT: No tonsillar erythema, exudates, or enlargement; Moist mucous membranes, Good dentition, No lesions  NECK: Supple, No JVD, Normal thyroid  NERVOUS SYSTEM:  Alert & Oriented X3, Good concentration; Motor Strength 5/5 B/L upper and lower extremities; DTRs 2+ intact and symmetric  CHEST/LUNG: Clear to percussion bilaterally; No rales, rhonchi, wheezing, or rubs  HEART: Regular rate and rhythm; No murmurs, rubs, or gallops  ABDOMEN: Soft, Nontender, Nondistended; Bowel sounds present  EXTREMITIES:  2+ Peripheral Pulses, No clubbing, cyanosis, or edema  LYMPH: No lymphadenopathy noted  SKIN: No rashes or lesions    LABS:    01-29    144  |  113<H>  |  11  ----------------------------<  85  3.7   |  21<L>  |  0.59    Ca    8.2<L>      29 Jan 2018 07:26          CAPILLARY BLOOD GLUCOSE                MEDICATIONS  (STANDING):  acetazolamide   ER Capsule 500 milliGRAM(s) Oral two times a day  ARIPiprazole 20 milliGRAM(s) Oral daily  benztropine 1 milliGRAM(s) Oral two times a day  bethanechol 25 milliGRAM(s) Oral three times a day  brimonidine 0.2% Ophthalmic Solution 1 Drop(s) Both EYES three times a day  citalopram 40 milliGRAM(s) Oral daily  latanoprost 0.005% Ophthalmic Solution 1 Drop(s) Both EYES at bedtime  LORazepam     Tablet 0.5 milliGRAM(s) Oral at bedtime  multivitamin 1 Tablet(s) Oral daily  oseltamivir 75 milliGRAM(s) Oral two times a day  polyethylene glycol 3350 17 Gram(s) Oral daily  sodium chloride 0.9%. 1000 milliLiter(s) (75 mL/Hr) IV Continuous <Continuous>  tamsulosin 0.4 milliGRAM(s) Oral at bedtime  timolol 0.5% Solution 1 Drop(s) Both EYES two times a day  valproic  acid Syrup 1000 milliGRAM(s) Oral two times a day    MEDICATIONS  (PRN):  guaiFENesin    Syrup 100 milliGRAM(s) Oral every 6 hours PRN Cough  oxyCODONE    5 mG/acetaminophen 325 mG 2 Tablet(s) Oral every 6 hours PRN Moderate Pain (4 - 6)      Care Discussed with Consultants/Other Providers [ ] YES  [ ] NO
Patient is a 62y old  Male who presents with a chief complaint of sob / coughing , fever (30 Jan 2018 13:59)      INTERVAL HPI/OVERNIGHT EVENTS:  T(C): 37.2 (02-08-18 @ 16:03), Max: 37.2 (02-08-18 @ 07:55)  HR: 60 (02-08-18 @ 16:03) (60 - 84)  BP: 96/60 (02-08-18 @ 16:03) (93/57 - 108/76)  RR: 18 (02-08-18 @ 16:03) (18 - 18)  SpO2: 97% (02-08-18 @ 16:03) (96% - 97%)  Wt(kg): --  I&O's Summary    07 Feb 2018 07:01  -  08 Feb 2018 07:00  --------------------------------------------------------  IN: 300 mL / OUT: 0 mL / NET: 300 mL    08 Feb 2018 07:01  -  08 Feb 2018 21:52  --------------------------------------------------------  IN: 480 mL / OUT: 0 mL / NET: 480 mL        PAST MEDICAL & SURGICAL HISTORY:  PTSD (post-traumatic stress disorder)  Constipation  Mood disorder  Mental retardation  S/P hip replacement      SOCIAL HISTORY  Alcohol:  Tobacco:  Illicit substance use:    FAMILY HISTORY:    REVIEW OF SYSTEMS:  CONSTITUTIONAL: No fever, weight loss, or fatigue  EYES: No eye pain, visual disturbances, or discharge  ENMT:  No difficulty hearing, tinnitus, vertigo; No sinus or throat pain  NECK: No pain or stiffness  RESPIRATORY: No cough, wheezing, chills or hemoptysis; No shortness of breath  CARDIOVASCULAR: No chest pain, palpitations, dizziness, or leg swelling  GASTROINTESTINAL: No abdominal or epigastric pain. No nausea, vomiting, or hematemesis; No diarrhea or constipation. No melena or hematochezia.  GENITOURINARY: No dysuria, frequency, hematuria, or incontinence  NEUROLOGICAL: No headaches, memory loss, loss of strength, numbness, or tremors  SKIN: No itching, burning, rashes, or lesions   LYMPH NODES: No enlarged glands  ENDOCRINE: No heat or cold intolerance; No hair loss  MUSCULOSKELETAL: No joint pain or swelling; No muscle, back, or extremity pain  PSYCHIATRIC: No depression, anxiety, mood swings, or difficulty sleeping  HEME/LYMPH: No easy bruising, or bleeding gums  ALLERY AND IMMUNOLOGIC: No hives or eczema    RADIOLOGY & ADDITIONAL TESTS:    Imaging Personally Reviewed:  [ ] YES  [ ] NO    Consultant(s) Notes Reviewed:  [ ] YES  [ ] NO    PHYSICAL EXAM:  GENERAL: NAD, well-groomed, well-developed  HEAD:  Atraumatic, Normocephalic  EYES: EOMI, PERRLA, conjunctiva and sclera clear  ENMT: No tonsillar erythema, exudates, or enlargement; Moist mucous membranes, Good dentition, No lesions  NECK: Supple, No JVD, Normal thyroid  NERVOUS SYSTEM:  Alert & Oriented X3, Good concentration; Motor Strength 5/5 B/L upper and lower extremities; DTRs 2+ intact and symmetric  CHEST/LUNG: Clear to percussion bilaterally; No rales, rhonchi, wheezing, or rubs  HEART: Regular rate and rhythm; No murmurs, rubs, or gallops  ABDOMEN: Soft, Nontender, Nondistended; Bowel sounds present  EXTREMITIES:  2+ Peripheral Pulses, No clubbing, cyanosis, or edema  LYMPH: No lymphadenopathy noted  SKIN: No rashes or lesions    LABS:              CAPILLARY BLOOD GLUCOSE                MEDICATIONS  (STANDING):  acetazolamide   ER Capsule 500 milliGRAM(s) Oral two times a day  ARIPiprazole 20 milliGRAM(s) Oral daily  benztropine 1 milliGRAM(s) Oral two times a day  bethanechol 25 milliGRAM(s) Oral three times a day  brimonidine 0.2% Ophthalmic Solution 1 Drop(s) Both EYES three times a day  citalopram 40 milliGRAM(s) Oral daily  guaiFENesin    Syrup 100 milliGRAM(s) Oral every 6 hours  latanoprost 0.005% Ophthalmic Solution 1 Drop(s) Both EYES at bedtime  multivitamin 1 Tablet(s) Oral daily  polyethylene glycol 3350 17 Gram(s) Oral daily  tamsulosin 0.4 milliGRAM(s) Oral at bedtime  timolol 0.5% Solution 1 Drop(s) Both EYES two times a day  valproic  acid Syrup 1000 milliGRAM(s) Oral two times a day    MEDICATIONS  (PRN):      Care Discussed with Consultants/Other Providers [ ] YES  [ ] NO
Dx: pt. admitted with febrile neutropenia secondary to s/p chemo for non-hodgkins lymphoma  2. sepsis present on admission
Follow Up:  flu    Interval History: pt afebrile, stable, no acute events    ROS:      All other systems negative    Constitutional: no fever, no chills  HEENT:  no vision changes, no sore throat, improving rhinorrhea  Cardiovascular:  no chest pain, no palpitation  Respiratory:  improving  cough  GI:  no abd pain, no vomiting, no diarrhea  urinary: no dysuria, no hematuria, no flank pain  musculoskeletal:  no joint pain, no joint swelling  skin:  no rash  neurology:  no headache, no seizure, no change in mental status        Allergies  No Known Allergies        ANTIMICROBIALS:  oseltamivir 75 two times a day      OTHER MEDS:  acetazolamide   ER Capsule 500 milliGRAM(s) Oral two times a day  ARIPiprazole 20 milliGRAM(s) Oral daily  benztropine 1 milliGRAM(s) Oral two times a day  bethanechol 25 milliGRAM(s) Oral three times a day  brimonidine 0.2% Ophthalmic Solution 1 Drop(s) Both EYES three times a day  citalopram 40 milliGRAM(s) Oral daily  guaiFENesin    Syrup 100 milliGRAM(s) Oral every 6 hours PRN  latanoprost 0.005% Ophthalmic Solution 1 Drop(s) Both EYES at bedtime  LORazepam     Tablet 0.5 milliGRAM(s) Oral at bedtime  multivitamin 1 Tablet(s) Oral daily  oxyCODONE    5 mG/acetaminophen 325 mG 2 Tablet(s) Oral every 6 hours PRN  polyethylene glycol 3350 17 Gram(s) Oral daily  sodium chloride 0.9%. 1000 milliLiter(s) IV Continuous <Continuous>  tamsulosin 0.4 milliGRAM(s) Oral at bedtime  timolol 0.5% Solution 1 Drop(s) Both EYES two times a day  valproic  acid Syrup 1000 milliGRAM(s) Oral two times a day      Vital Signs Last 24 Hrs  T(C): 36.8 (29 Jan 2018 13:43), Max: 36.9 (28 Jan 2018 17:22)  T(F): 98.2 (29 Jan 2018 13:43), Max: 98.4 (28 Jan 2018 17:22)  HR: 66 (29 Jan 2018 13:43) (59 - 66)  BP: 94/56 (29 Jan 2018 13:43) (79/57 - 95/59)  BP(mean): --  RR: 18 (29 Jan 2018 13:43) (16 - 18)  SpO2: 99% (29 Jan 2018 13:43) (97% - 99%)    Physical Exam:  General:    NAD,  non toxic, A&O x 3  HEENT:    no oropharyngeal lesions,   no LAD,   neck supple  Cardio:     regular S1, S2,  no murmur  Respiratory:    clear b/l,    no wheezing  abd:     soft,   BS +,   no tenderness,    no organomegaly  :   no CVAT,  no suprapubic tenderness,   no  yo  Musculoskeletal:   no joint swelling,   no edema  vascular: no lines, normal pulses  Skin:    no rash  Neurologic:     no focal deficit  psych: normal affect, no suicidal ideation        01-29    144  |  113<H>  |  11  ----------------------------<  85  3.7   |  21<L>  |  0.59    Ca    8.2<L>      29 Jan 2018 07:26            MICROBIOLOGY:  v  .Blood Blood  01-26-18   No growth to date.  --  --      .Blood Blood  01-26-18   No growth to date.  --  --          Rapid RVP Result: Detected (01-26 @ 09:19)        Culture - Blood (collected 26 Jan 2018 16:02)  Source: .Blood Blood  Preliminary Report (27 Jan 2018 17:01):    No growth to date.      RADIOLOGY:    < from: CT 3D Reconstruct w/ Workstation (01.27.18 @ 01:55) >    IMPRESSION:  1.  No acute fracture or dislocation.  2.  Mild right and moderate to severe left hip joint osteoarthritis.  3.  Chronic healed fracture deformity of the left proximal femur.
Patient is a 62y old  Male who presents with a chief complaint of sob / coughing , fever (2018 23:06)    pt. seen and examined, NAD    INTERVAL HPI/OVERNIGHT EVENTS:  T(C): 36.7 (18 @ 16:18), Max: 36.7 (18 @ 10:18)  HR: 60 (18 @ 16:18) (60 - 72)  BP: 99/60 (18 @ 16:18) (92/60 - 104/62)  RR: 18 (18 @ 16:18) (18 - 18)  SpO2: 98% (18 @ 16:18) (98% - 99%)  Wt(kg): --  I&O's Summary    2018 07:01  -  2018 07:00  --------------------------------------------------------  IN: 350 mL / OUT: 1 mL / NET: 349 mL        PAST MEDICAL & SURGICAL HISTORY:  PTSD (post-traumatic stress disorder)  Constipation  Mood disorder  Mental retardation  S/P hip replacement      SOCIAL HISTORY  Alcohol:  Tobacco:  Illicit substance use:    FAMILY HISTORY:    REVIEW OF SYSTEMS:  CONSTITUTIONAL: No fever, weight loss, or fatigue  EYES: No eye pain, visual disturbances, or discharge  ENMT:  No difficulty hearing, tinnitus, vertigo; No sinus or throat pain  NECK: No pain or stiffness  RESPIRATORY: No cough, wheezing, chills or hemoptysis; No shortness of breath  CARDIOVASCULAR: No chest pain, palpitations, dizziness, or leg swelling  GASTROINTESTINAL: No abdominal or epigastric pain. No nausea, vomiting, or hematemesis; No diarrhea or constipation. No melena or hematochezia.  GENITOURINARY: No dysuria, frequency, hematuria, or incontinence  NEUROLOGICAL: No headaches, memory loss, loss of strength, numbness, or tremors  SKIN: No itching, burning, rashes, or lesions   LYMPH NODES: No enlarged glands  ENDOCRINE: No heat or cold intolerance; No hair loss  MUSCULOSKELETAL: No joint pain or swelling; No muscle, back, or extremity pain  PSYCHIATRIC: No depression, anxiety, mood swings, or difficulty sleeping  HEME/LYMPH: No easy bruising, or bleeding gums  ALLERY AND IMMUNOLOGIC: No hives or eczema    RADIOLOGY & ADDITIONAL TESTS:    Imaging Personally Reviewed:  [ ] YES  [ ] NO    Consultant(s) Notes Reviewed:  [ ] YES  [ ] NO    PHYSICAL EXAM:  GENERAL: NAD, well-groomed, well-developed  HEAD:  Atraumatic, Normocephalic  EYES: EOMI, PERRLA, conjunctiva and sclera clear  ENMT: No tonsillar erythema, exudates, or enlargement; Moist mucous membranes, Good dentition, No lesions  NECK: Supple, No JVD, Normal thyroid  NERVOUS SYSTEM:  Alert & Oriented X3, Good concentration; Motor Strength 5/5 B/L upper and lower extremities; DTRs 2+ intact and symmetric  CHEST/LUNG: Clear to percussion bilaterally; No rales, rhonchi, wheezing, or rubs  HEART: Regular rate and rhythm; No murmurs, rubs, or gallops  ABDOMEN: Soft, Nontender, Nondistended; Bowel sounds present  EXTREMITIES:  2+ Peripheral Pulses, No clubbing, cyanosis, or edema  LYMPH: No lymphadenopathy noted  SKIN: No rashes or lesions    LABS:                        12.1   4.65  )-----------( 102      ( 2018 09:02 )             37.2     -    139  |  106  |  10  ----------------------------<  108<H>  3.4<L>   |  19<L>  |  0.65    Ca    8.3<L>      2018 09:09        Urinalysis Basic - ( 2018 12:49 )    Color: Yellow / Appearance: Slightly Turbid / S.026 / pH: x  Gluc: x / Ketone: Negative  / Bili: Negative / Urobili: Negative mg/dL   Blood: x / Protein: Trace mg/dL / Nitrite: Negative   Leuk Esterase: Negative / RBC: 2 /HPF / WBC 0 /HPF   Sq Epi: x / Non Sq Epi: 1 /HPF / Bacteria: Negative      CAPILLARY BLOOD GLUCOSE            Urinalysis Basic - ( 2018 12:49 )    Color: Yellow / Appearance: Slightly Turbid / S.026 / pH: x  Gluc: x / Ketone: Negative  / Bili: Negative / Urobili: Negative mg/dL   Blood: x / Protein: Trace mg/dL / Nitrite: Negative   Leuk Esterase: Negative / RBC: 2 /HPF / WBC 0 /HPF   Sq Epi: x / Non Sq Epi: 1 /HPF / Bacteria: Negative        MEDICATIONS  (STANDING):  acetazolamide   ER Capsule 500 milliGRAM(s) Oral two times a day  ARIPiprazole 20 milliGRAM(s) Oral daily  benztropine 1 milliGRAM(s) Oral two times a day  bethanechol 25 milliGRAM(s) Oral three times a day  brimonidine 0.2% Ophthalmic Solution 1 Drop(s) Both EYES three times a day  citalopram 40 milliGRAM(s) Oral daily  latanoprost 0.005% Ophthalmic Solution 1 Drop(s) Both EYES at bedtime  LORazepam     Tablet 0.5 milliGRAM(s) Oral at bedtime  multivitamin 1 Tablet(s) Oral daily  oseltamivir 75 milliGRAM(s) Oral two times a day  polyethylene glycol 3350 17 Gram(s) Oral daily  tamsulosin 0.4 milliGRAM(s) Oral at bedtime  timolol 0.5% Solution 1 Drop(s) Both EYES two times a day  valproic  acid Syrup 1000 milliGRAM(s) Oral two times a day    MEDICATIONS  (PRN):  oxyCODONE    5 mG/acetaminophen 325 mG 2 Tablet(s) Oral every 6 hours PRN Moderate Pain (4 - 6)      Care Discussed with Consultants/Other Providers [ ] YES  [ ] NO
Patient is a 62y old  Male who presents with a chief complaint of sob / coughing , fever (2018 23:06)    pt. seen and examined, still c/o leg pain/ knee pain   spiked fever early morning    INTERVAL HPI/OVERNIGHT EVENTS:  T(C): 36.7 (18 @ 17:45), Max: 39.9 (18 @ 07:12)  HR: 66 (18 @ 17:45) (66 - 81)  BP: 93/56 (18 @ 17:45) (93/56 - 126/69)  RR: 18 (18 @ 17:45) (18 - 20)  SpO2: 93% (18 @ 17:45) (93% - 98%)  Wt(kg): --  I&O's Summary    2018 07:01  -  2018 21:16  --------------------------------------------------------  IN: 350 mL / OUT: 1 mL / NET: 349 mL        PAST MEDICAL & SURGICAL HISTORY:  PTSD (post-traumatic stress disorder)  Constipation  Mood disorder  Mental retardation  S/P hip replacement      SOCIAL HISTORY  Alcohol:  Tobacco:  Illicit substance use:    FAMILY HISTORY:    REVIEW OF SYSTEMS:  CONSTITUTIONAL: No fever, weight loss, or fatigue  EYES: No eye pain, visual disturbances, or discharge  ENMT:  No difficulty hearing, tinnitus, vertigo; No sinus or throat pain  NECK: No pain or stiffness  RESPIRATORY: No cough, wheezing, chills or hemoptysis; No shortness of breath  CARDIOVASCULAR: No chest pain, palpitations, dizziness, or leg swelling  GASTROINTESTINAL: No abdominal or epigastric pain. No nausea, vomiting, or hematemesis; No diarrhea or constipation. No melena or hematochezia.  GENITOURINARY: No dysuria, frequency, hematuria, or incontinence  NEUROLOGICAL: No headaches, memory loss, loss of strength, numbness, or tremors  SKIN: No itching, burning, rashes, or lesions   LYMPH NODES: No enlarged glands  ENDOCRINE: No heat or cold intolerance; No hair loss  MUSCULOSKELETAL: No joint pain or swelling; No muscle, back, or extremity pain  PSYCHIATRIC: No depression, anxiety, mood swings, or difficulty sleeping  HEME/LYMPH: No easy bruising, or bleeding gums  ALLERY AND IMMUNOLOGIC: No hives or eczema    RADIOLOGY & ADDITIONAL TESTS:    Imaging Personally Reviewed:  [ ] YES  [ ] NO    Consultant(s) Notes Reviewed:  [ ] YES  [ ] NO    PHYSICAL EXAM:  GENERAL: NAD, well-groomed, well-developed  HEAD:  Atraumatic, Normocephalic  EYES: EOMI, PERRLA, conjunctiva and sclera clear  ENMT: No tonsillar erythema, exudates, or enlargement; Moist mucous membranes, Good dentition, No lesions  NECK: Supple, No JVD, Normal thyroid  NERVOUS SYSTEM:  Alert & Oriented X3, Good concentration; Motor Strength 5/5 B/L upper and lower extremities; DTRs 2+ intact and symmetric  CHEST/LUNG: Clear to percussion bilaterally; No rales, rhonchi, wheezing, or rubs  HEART: Regular rate and rhythm; No murmurs, rubs, or gallops  ABDOMEN: Soft, Nontender, Nondistended; Bowel sounds present  EXTREMITIES:  2+ Peripheral Pulses, No clubbing, cyanosis, or edema  LYMPH: No lymphadenopathy noted  SKIN: No rashes or lesions    LABS:                        14.2   6.2   )-----------( 110      ( 2018 13:27 )             40.6         140  |  103  |  9   ----------------------------<  87  4.2   |  24  |  0.59    Ca    8.8      2018 13:27    TPro  6.2  /  Alb  3.3  /  TBili  0.4  /  DBili  x   /  AST  16  /  ALT  6<L>  /  AlkPhos  37<L>        Urinalysis Basic - ( 2018 12:49 )    Color: Yellow / Appearance: Slightly Turbid / S.026 / pH: x  Gluc: x / Ketone: Negative  / Bili: Negative / Urobili: Negative mg/dL   Blood: x / Protein: Trace mg/dL / Nitrite: Negative   Leuk Esterase: Negative / RBC: 2 /HPF / WBC 0 /HPF   Sq Epi: x / Non Sq Epi: 1 /HPF / Bacteria: Negative      CAPILLARY BLOOD GLUCOSE            Urinalysis Basic - ( 2018 12:49 )    Color: Yellow / Appearance: Slightly Turbid / S.026 / pH: x  Gluc: x / Ketone: Negative  / Bili: Negative / Urobili: Negative mg/dL   Blood: x / Protein: Trace mg/dL / Nitrite: Negative   Leuk Esterase: Negative / RBC: 2 /HPF / WBC 0 /HPF   Sq Epi: x / Non Sq Epi: 1 /HPF / Bacteria: Negative        MEDICATIONS  (STANDING):  acetazolamide   ER Capsule 500 milliGRAM(s) Oral two times a day  ARIPiprazole 20 milliGRAM(s) Oral daily  benztropine 1 milliGRAM(s) Oral two times a day  bethanechol 25 milliGRAM(s) Oral three times a day  brimonidine 0.2% Ophthalmic Solution 1 Drop(s) Both EYES three times a day  citalopram 40 milliGRAM(s) Oral daily  latanoprost 0.005% Ophthalmic Solution 1 Drop(s) Both EYES at bedtime  LORazepam     Tablet 0.5 milliGRAM(s) Oral at bedtime  multivitamin 1 Tablet(s) Oral daily  oseltamivir 75 milliGRAM(s) Oral two times a day  polyethylene glycol 3350 17 Gram(s) Oral daily  tamsulosin 0.4 milliGRAM(s) Oral at bedtime  timolol 0.5% Solution 1 Drop(s) Both EYES two times a day  valproic  acid Syrup 1000 milliGRAM(s) Oral two times a day    MEDICATIONS  (PRN):  oxyCODONE    5 mG/acetaminophen 325 mG 2 Tablet(s) Oral every 6 hours PRN Moderate Pain (4 - 6)      Care Discussed with Consultants/Other Providers [ ] YES  [ ] NO
Patient is a 62y old  Male who presents with a chief complaint of sob / coughing , fever (25 Jan 2018 23:06)    doing fair, NAD    INTERVAL HPI/OVERNIGHT EVENTS:  T(C): 36.9 (01-28-18 @ 17:22), Max: 36.9 (01-28-18 @ 17:22)  HR: 59 (01-28-18 @ 17:22) (58 - 69)  BP: 94/59 (01-28-18 @ 17:22) (79/57 - 100/62)  RR: 18 (01-28-18 @ 17:22) (16 - 18)  SpO2: 99% (01-28-18 @ 17:22) (98% - 99%)  Wt(kg): --  I&O's Summary      PAST MEDICAL & SURGICAL HISTORY:  PTSD (post-traumatic stress disorder)  Constipation  Mood disorder  Mental retardation  S/P hip replacement      SOCIAL HISTORY  Alcohol:  Tobacco:  Illicit substance use:    FAMILY HISTORY:    REVIEW OF SYSTEMS:  CONSTITUTIONAL: No fever, weight loss, or fatigue  EYES: No eye pain, visual disturbances, or discharge  ENMT:  No difficulty hearing, tinnitus, vertigo; No sinus or throat pain  NECK: No pain or stiffness  RESPIRATORY: No cough, wheezing, chills or hemoptysis; No shortness of breath  CARDIOVASCULAR: No chest pain, palpitations, dizziness, or leg swelling  GASTROINTESTINAL: No abdominal or epigastric pain. No nausea, vomiting, or hematemesis; No diarrhea or constipation. No melena or hematochezia.  GENITOURINARY: No dysuria, frequency, hematuria, or incontinence  NEUROLOGICAL: No headaches, memory loss, loss of strength, numbness, or tremors  SKIN: No itching, burning, rashes, or lesions   LYMPH NODES: No enlarged glands  ENDOCRINE: No heat or cold intolerance; No hair loss  MUSCULOSKELETAL: No joint pain or swelling; No muscle, back, or extremity pain  PSYCHIATRIC: No depression, anxiety, mood swings, or difficulty sleeping  HEME/LYMPH: No easy bruising, or bleeding gums  ALLERY AND IMMUNOLOGIC: No hives or eczema    RADIOLOGY & ADDITIONAL TESTS:    Imaging Personally Reviewed:  [ ] YES  [ ] NO    Consultant(s) Notes Reviewed:  [ ] YES  [ ] NO    PHYSICAL EXAM:  GENERAL: NAD, well-groomed, well-developed  HEAD:  Atraumatic, Normocephalic  EYES: EOMI, PERRLA, conjunctiva and sclera clear  ENMT: No tonsillar erythema, exudates, or enlargement; Moist mucous membranes, Good dentition, No lesions  NECK: Supple, No JVD, Normal thyroid  NERVOUS SYSTEM:  Alert & Oriented X3, Good concentration; Motor Strength 5/5 B/L upper and lower extremities; DTRs 2+ intact and symmetric  CHEST/LUNG: Clear to percussion bilaterally; No rales, rhonchi, wheezing, or rubs  HEART: Regular rate and rhythm; No murmurs, rubs, or gallops  ABDOMEN: Soft, Nontender, Nondistended; Bowel sounds present  EXTREMITIES:  2+ Peripheral Pulses, No clubbing, cyanosis, or edema  LYMPH: No lymphadenopathy noted  SKIN: No rashes or lesions    LABS:                        12.1   4.65  )-----------( 102      ( 27 Jan 2018 09:02 )             37.2     01-28    149<H>  |  113<H>  |  12  ----------------------------<  77  3.7   |  18<L>  |  0.71    Ca    8.7      28 Jan 2018 11:24          CAPILLARY BLOOD GLUCOSE                MEDICATIONS  (STANDING):  acetazolamide   ER Capsule 500 milliGRAM(s) Oral two times a day  ARIPiprazole 20 milliGRAM(s) Oral daily  benztropine 1 milliGRAM(s) Oral two times a day  bethanechol 25 milliGRAM(s) Oral three times a day  brimonidine 0.2% Ophthalmic Solution 1 Drop(s) Both EYES three times a day  citalopram 40 milliGRAM(s) Oral daily  latanoprost 0.005% Ophthalmic Solution 1 Drop(s) Both EYES at bedtime  LORazepam     Tablet 0.5 milliGRAM(s) Oral at bedtime  multivitamin 1 Tablet(s) Oral daily  oseltamivir 75 milliGRAM(s) Oral two times a day  polyethylene glycol 3350 17 Gram(s) Oral daily  sodium chloride 0.9%. 1000 milliLiter(s) (75 mL/Hr) IV Continuous <Continuous>  tamsulosin 0.4 milliGRAM(s) Oral at bedtime  timolol 0.5% Solution 1 Drop(s) Both EYES two times a day  valproic  acid Syrup 1000 milliGRAM(s) Oral two times a day    MEDICATIONS  (PRN):  guaiFENesin    Syrup 100 milliGRAM(s) Oral every 6 hours PRN Cough  oxyCODONE    5 mG/acetaminophen 325 mG 2 Tablet(s) Oral every 6 hours PRN Moderate Pain (4 - 6)      Care Discussed with Consultants/Other Providers [ ] YES  [ ] NO
Patient is a 62y old  Male who presents with a chief complaint of sob / coughing , fever (30 Jan 2018 13:59)      INTERVAL HPI/OVERNIGHT EVENTS:  T(C): 36.3 (01-30-18 @ 14:00), Max: 36.8 (01-30-18 @ 09:35)  HR: 81 (01-30-18 @ 14:00) (58 - 105)  BP: 104/61 (01-30-18 @ 14:00) (81/40 - 104/61)  RR: 18 (01-30-18 @ 14:00) (18 - 18)  SpO2: 95% (01-30-18 @ 14:00) (94% - 95%)  Wt(kg): --  I&O's Summary    29 Jan 2018 07:01  -  30 Jan 2018 07:00  --------------------------------------------------------  IN: 1460 mL / OUT: 0 mL / NET: 1460 mL    30 Jan 2018 07:01  -  30 Jan 2018 19:59  --------------------------------------------------------  IN: 350 mL / OUT: 0 mL / NET: 350 mL        PAST MEDICAL & SURGICAL HISTORY:  PTSD (post-traumatic stress disorder)  Constipation  Mood disorder  Mental retardation  S/P hip replacement      SOCIAL HISTORY  Alcohol:  Tobacco:  Illicit substance use:    FAMILY HISTORY:    REVIEW OF SYSTEMS:  CONSTITUTIONAL: No fever, weight loss, or fatigue  EYES: No eye pain, visual disturbances, or discharge  ENMT:  No difficulty hearing, tinnitus, vertigo; No sinus or throat pain  NECK: No pain or stiffness  RESPIRATORY: No cough, wheezing, chills or hemoptysis; No shortness of breath  CARDIOVASCULAR: No chest pain, palpitations, dizziness, or leg swelling  GASTROINTESTINAL: No abdominal or epigastric pain. No nausea, vomiting, or hematemesis; No diarrhea or constipation. No melena or hematochezia.  GENITOURINARY: No dysuria, frequency, hematuria, or incontinence  NEUROLOGICAL: No headaches, memory loss, loss of strength, numbness, or tremors  SKIN: No itching, burning, rashes, or lesions   LYMPH NODES: No enlarged glands  ENDOCRINE: No heat or cold intolerance; No hair loss  MUSCULOSKELETAL: No joint pain or swelling; No muscle, back, or extremity pain  PSYCHIATRIC: No depression, anxiety, mood swings, or difficulty sleeping  HEME/LYMPH: No easy bruising, or bleeding gums  ALLERY AND IMMUNOLOGIC: No hives or eczema    RADIOLOGY & ADDITIONAL TESTS:    Imaging Personally Reviewed:  [ ] YES  [ ] NO    Consultant(s) Notes Reviewed:  [ ] YES  [ ] NO    PHYSICAL EXAM:  GENERAL: NAD, well-groomed, well-developed  HEAD:  Atraumatic, Normocephalic  EYES: EOMI, PERRLA, conjunctiva and sclera clear  ENMT: No tonsillar erythema, exudates, or enlargement; Moist mucous membranes, Good dentition, No lesions  NECK: Supple, No JVD, Normal thyroid  NERVOUS SYSTEM:  Alert & Oriented X3, Good concentration; Motor Strength 5/5 B/L upper and lower extremities; DTRs 2+ intact and symmetric  CHEST/LUNG: Clear to percussion bilaterally; No rales, rhonchi, wheezing, or rubs  HEART: Regular rate and rhythm; No murmurs, rubs, or gallops  ABDOMEN: Soft, Nontender, Nondistended; Bowel sounds present  EXTREMITIES:  2+ Peripheral Pulses, No clubbing, cyanosis, or edema  LYMPH: No lymphadenopathy noted  SKIN: No rashes or lesions    LABS:                        13.4   4.5   )-----------( 106      ( 30 Jan 2018 05:37 )             38.8     01-30    144  |  111<H>  |  8   ----------------------------<  86  3.6   |  22  |  0.66    Ca    8.3<L>      30 Jan 2018 05:37          CAPILLARY BLOOD GLUCOSE                MEDICATIONS  (STANDING):  acetazolamide   ER Capsule 500 milliGRAM(s) Oral two times a day  ARIPiprazole 20 milliGRAM(s) Oral daily  benztropine 1 milliGRAM(s) Oral two times a day  bethanechol 25 milliGRAM(s) Oral three times a day  brimonidine 0.2% Ophthalmic Solution 1 Drop(s) Both EYES three times a day  citalopram 40 milliGRAM(s) Oral daily  latanoprost 0.005% Ophthalmic Solution 1 Drop(s) Both EYES at bedtime  LORazepam     Tablet 0.5 milliGRAM(s) Oral at bedtime  multivitamin 1 Tablet(s) Oral daily  oseltamivir 75 milliGRAM(s) Oral two times a day  polyethylene glycol 3350 17 Gram(s) Oral daily  tamsulosin 0.4 milliGRAM(s) Oral at bedtime  timolol 0.5% Solution 1 Drop(s) Both EYES two times a day  valproic  acid Syrup 1000 milliGRAM(s) Oral two times a day    MEDICATIONS  (PRN):  guaiFENesin    Syrup 100 milliGRAM(s) Oral every 6 hours PRN Cough  oxyCODONE    5 mG/acetaminophen 325 mG 2 Tablet(s) Oral every 6 hours PRN Moderate Pain (4 - 6)      Care Discussed with Consultants/Other Providers [ ] YES  [ ] NO
Patient is a 62y old  Male who presents with a chief complaint of sob / coughing , fever (30 Jan 2018 13:59)      INTERVAL HPI/OVERNIGHT EVENTS:  T(C): 37.3 (02-03-18 @ 16:54), Max: 37.3 (02-03-18 @ 16:54)  HR: 96 (02-03-18 @ 16:54) (56 - 96)  BP: 97/64 (02-03-18 @ 16:54) (94/60 - 102/55)  RR: 18 (02-03-18 @ 16:54) (18 - 18)  SpO2: 99% (02-03-18 @ 16:54) (95% - 99%)  Wt(kg): --  I&O's Summary    02 Feb 2018 07:01  -  03 Feb 2018 07:00  --------------------------------------------------------  IN: 360 mL / OUT: 0 mL / NET: 360 mL    03 Feb 2018 07:01  -  03 Feb 2018 20:39  --------------------------------------------------------  IN: 500 mL / OUT: 0 mL / NET: 500 mL        PAST MEDICAL & SURGICAL HISTORY:  PTSD (post-traumatic stress disorder)  Constipation  Mood disorder  Mental retardation  S/P hip replacement      SOCIAL HISTORY  Alcohol:  Tobacco:  Illicit substance use:    FAMILY HISTORY:    REVIEW OF SYSTEMS:  CONSTITUTIONAL: No fever, weight loss, or fatigue  EYES: No eye pain, visual disturbances, or discharge  ENMT:  No difficulty hearing, tinnitus, vertigo; No sinus or throat pain  NECK: No pain or stiffness  RESPIRATORY: No cough, wheezing, chills or hemoptysis; No shortness of breath  CARDIOVASCULAR: No chest pain, palpitations, dizziness, or leg swelling  GASTROINTESTINAL: No abdominal or epigastric pain. No nausea, vomiting, or hematemesis; No diarrhea or constipation. No melena or hematochezia.  GENITOURINARY: No dysuria, frequency, hematuria, or incontinence  NEUROLOGICAL: No headaches, memory loss, loss of strength, numbness, or tremors  SKIN: No itching, burning, rashes, or lesions   LYMPH NODES: No enlarged glands  ENDOCRINE: No heat or cold intolerance; No hair loss  MUSCULOSKELETAL: No joint pain or swelling; No muscle, back, or extremity pain  PSYCHIATRIC: No depression, anxiety, mood swings, or difficulty sleeping  HEME/LYMPH: No easy bruising, or bleeding gums  ALLERY AND IMMUNOLOGIC: No hives or eczema    RADIOLOGY & ADDITIONAL TESTS:    Imaging Personally Reviewed:  [ ] YES  [ ] NO    Consultant(s) Notes Reviewed:  [ ] YES  [ ] NO    PHYSICAL EXAM:  GENERAL: NAD, well-groomed, well-developed  HEAD:  Atraumatic, Normocephalic  EYES: EOMI, PERRLA, conjunctiva and sclera clear  ENMT: No tonsillar erythema, exudates, or enlargement; Moist mucous membranes, Good dentition, No lesions  NECK: Supple, No JVD, Normal thyroid  NERVOUS SYSTEM:  Alert & Oriented X3, Good concentration; Motor Strength 5/5 B/L upper and lower extremities; DTRs 2+ intact and symmetric  CHEST/LUNG: Clear to percussion bilaterally; No rales, rhonchi, wheezing, or rubs  HEART: Regular rate and rhythm; No murmurs, rubs, or gallops  ABDOMEN: Soft, Nontender, Nondistended; Bowel sounds present  EXTREMITIES:  2+ Peripheral Pulses, No clubbing, cyanosis, or edema  LYMPH: No lymphadenopathy noted  SKIN: No rashes or lesions    LABS:                        13.3   5.39  )-----------( 111      ( 03 Feb 2018 10:31 )             40.0     02-03    145  |  108  |  17  ----------------------------<  85  3.9   |  23  |  0.66    Ca    9.2      03 Feb 2018 10:31          CAPILLARY BLOOD GLUCOSE                MEDICATIONS  (STANDING):  acetazolamide   ER Capsule 500 milliGRAM(s) Oral two times a day  ARIPiprazole 20 milliGRAM(s) Oral daily  benztropine 1 milliGRAM(s) Oral two times a day  bethanechol 25 milliGRAM(s) Oral three times a day  brimonidine 0.2% Ophthalmic Solution 1 Drop(s) Both EYES three times a day  citalopram 40 milliGRAM(s) Oral daily  latanoprost 0.005% Ophthalmic Solution 1 Drop(s) Both EYES at bedtime  multivitamin 1 Tablet(s) Oral daily  polyethylene glycol 3350 17 Gram(s) Oral daily  tamsulosin 0.4 milliGRAM(s) Oral at bedtime  timolol 0.5% Solution 1 Drop(s) Both EYES two times a day  valproic  acid Syrup 1000 milliGRAM(s) Oral two times a day    MEDICATIONS  (PRN):  guaiFENesin    Syrup 100 milliGRAM(s) Oral every 6 hours PRN Cough      Care Discussed with Consultants/Other Providers [ ] YES  [ ] NO
Patient is a 62y old  Male who presents with a chief complaint of sob / coughing , fever (30 Jan 2018 13:59)    pt. seen and examined in afternoon, feeling better      INTERVAL HPI/OVERNIGHT EVENTS:  T(C): 36.9 (01-31-18 @ 21:46), Max: 36.9 (01-31-18 @ 21:46)  HR: 77 (01-31-18 @ 21:46) (65 - 99)  BP: 109/61 (01-31-18 @ 21:46) (96/51 - 110/70)  RR: 18 (01-31-18 @ 21:46) (18 - 18)  SpO2: 97% (01-31-18 @ 21:46) (93% - 97%)  Wt(kg): --  I&O's Summary    30 Jan 2018 07:01  -  31 Jan 2018 07:00  --------------------------------------------------------  IN: 850 mL / OUT: 0 mL / NET: 850 mL    31 Jan 2018 07:01  -  31 Jan 2018 23:58  --------------------------------------------------------  IN: 540 mL / OUT: 0 mL / NET: 540 mL        PAST MEDICAL & SURGICAL HISTORY:  PTSD (post-traumatic stress disorder)  Constipation  Mood disorder  Mental retardation  S/P hip replacement      SOCIAL HISTORY  Alcohol:  Tobacco:  Illicit substance use:    FAMILY HISTORY:    REVIEW OF SYSTEMS:  CONSTITUTIONAL: No fever, weight loss, or fatigue  EYES: No eye pain, visual disturbances, or discharge  ENMT:  No difficulty hearing, tinnitus, vertigo; No sinus or throat pain  NECK: No pain or stiffness  RESPIRATORY: No cough, wheezing, chills or hemoptysis; No shortness of breath  CARDIOVASCULAR: No chest pain, palpitations, dizziness, or leg swelling  GASTROINTESTINAL: No abdominal or epigastric pain. No nausea, vomiting, or hematemesis; No diarrhea or constipation. No melena or hematochezia.  GENITOURINARY: No dysuria, frequency, hematuria, or incontinence  NEUROLOGICAL: No headaches, memory loss, loss of strength, numbness, or tremors  SKIN: No itching, burning, rashes, or lesions   LYMPH NODES: No enlarged glands  ENDOCRINE: No heat or cold intolerance; No hair loss  MUSCULOSKELETAL: No joint pain or swelling; No muscle, back, or extremity pain  PSYCHIATRIC: No depression, anxiety, mood swings, or difficulty sleeping  HEME/LYMPH: No easy bruising, or bleeding gums  ALLERY AND IMMUNOLOGIC: No hives or eczema    RADIOLOGY & ADDITIONAL TESTS:    Imaging Personally Reviewed:  [ ] YES  [ ] NO    Consultant(s) Notes Reviewed:  [ ] YES  [ ] NO    PHYSICAL EXAM:  GENERAL: NAD, well-groomed, well-developed  HEAD:  Atraumatic, Normocephalic  EYES: EOMI, PERRLA, conjunctiva and sclera clear  ENMT: No tonsillar erythema, exudates, or enlargement; Moist mucous membranes, Good dentition, No lesions  NECK: Supple, No JVD, Normal thyroid  NERVOUS SYSTEM:  Alert & Oriented X3, Good concentration; Motor Strength 5/5 B/L upper and lower extremities; DTRs 2+ intact and symmetric  CHEST/LUNG: Clear to percussion bilaterally; No rales, rhonchi, wheezing, or rubs  HEART: Regular rate and rhythm; No murmurs, rubs, or gallops  ABDOMEN: Soft, Nontender, Nondistended; Bowel sounds present  EXTREMITIES:  2+ Peripheral Pulses, No clubbing, cyanosis, or edema  LYMPH: No lymphadenopathy noted  SKIN: No rashes or lesions    LABS:                        13.4   4.5   )-----------( 106      ( 30 Jan 2018 05:37 )             38.8     01-30    144  |  111<H>  |  8   ----------------------------<  86  3.6   |  22  |  0.66    Ca    8.3<L>      30 Jan 2018 05:37          CAPILLARY BLOOD GLUCOSE                MEDICATIONS  (STANDING):  acetazolamide   ER Capsule 500 milliGRAM(s) Oral two times a day  ARIPiprazole 20 milliGRAM(s) Oral daily  benztropine 1 milliGRAM(s) Oral two times a day  bethanechol 25 milliGRAM(s) Oral three times a day  brimonidine 0.2% Ophthalmic Solution 1 Drop(s) Both EYES three times a day  citalopram 40 milliGRAM(s) Oral daily  latanoprost 0.005% Ophthalmic Solution 1 Drop(s) Both EYES at bedtime  LORazepam     Tablet 0.5 milliGRAM(s) Oral at bedtime  multivitamin 1 Tablet(s) Oral daily  polyethylene glycol 3350 17 Gram(s) Oral daily  tamsulosin 0.4 milliGRAM(s) Oral at bedtime  timolol 0.5% Solution 1 Drop(s) Both EYES two times a day  valproic  acid Syrup 1000 milliGRAM(s) Oral two times a day    MEDICATIONS  (PRN):  guaiFENesin    Syrup 100 milliGRAM(s) Oral every 6 hours PRN Cough  oxyCODONE    5 mG/acetaminophen 325 mG 2 Tablet(s) Oral every 6 hours PRN Moderate Pain (4 - 6)      Care Discussed with Consultants/Other Providers [ ] YES  [ ] NO
Patient is a 62y old  Male who presents with a chief complaint of sob / coughing , fever (30 Jan 2018 13:59)    pt. seen and examined, NAD    INTERVAL HPI/OVERNIGHT EVENTS:  T(C): 36.3 (02-05-18 @ 07:29), Max: 37.1 (02-04-18 @ 15:00)  HR: 63 (02-05-18 @ 07:51) (63 - 78)  BP: 92/52 (02-05-18 @ 07:51) (80/45 - 112/52)  RR: 16 (02-05-18 @ 07:29) (16 - 17)  SpO2: 94% (02-05-18 @ 07:29) (92% - 95%)  Wt(kg): --  I&O's Summary    04 Feb 2018 07:01  -  05 Feb 2018 07:00  --------------------------------------------------------  IN: 240 mL / OUT: 0 mL / NET: 240 mL        PAST MEDICAL & SURGICAL HISTORY:  PTSD (post-traumatic stress disorder)  Constipation  Mood disorder  Mental retardation  S/P hip replacement      SOCIAL HISTORY  Alcohol:  Tobacco:  Illicit substance use:    FAMILY HISTORY:    REVIEW OF SYSTEMS:  CONSTITUTIONAL: No fever, weight loss, or fatigue  EYES: No eye pain, visual disturbances, or discharge  ENMT:  No difficulty hearing, tinnitus, vertigo; No sinus or throat pain  NECK: No pain or stiffness  RESPIRATORY: No cough, wheezing, chills or hemoptysis; No shortness of breath  CARDIOVASCULAR: No chest pain, palpitations, dizziness, or leg swelling  GASTROINTESTINAL: No abdominal or epigastric pain. No nausea, vomiting, or hematemesis; No diarrhea or constipation. No melena or hematochezia.  GENITOURINARY: No dysuria, frequency, hematuria, or incontinence  NEUROLOGICAL: No headaches, memory loss, loss of strength, numbness, or tremors  SKIN: No itching, burning, rashes, or lesions   LYMPH NODES: No enlarged glands  ENDOCRINE: No heat or cold intolerance; No hair loss  MUSCULOSKELETAL: No joint pain or swelling; No muscle, back, or extremity pain  PSYCHIATRIC: No depression, anxiety, mood swings, or difficulty sleeping  HEME/LYMPH: No easy bruising, or bleeding gums  ALLERY AND IMMUNOLOGIC: No hives or eczema    RADIOLOGY & ADDITIONAL TESTS:    Imaging Personally Reviewed:  [ ] YES  [ ] NO    Consultant(s) Notes Reviewed:  [ ] YES  [ ] NO    PHYSICAL EXAM:  GENERAL: NAD, well-groomed, well-developed  HEAD:  Atraumatic, Normocephalic  EYES: EOMI, PERRLA, conjunctiva and sclera clear  ENMT: No tonsillar erythema, exudates, or enlargement; Moist mucous membranes, Good dentition, No lesions  NECK: Supple, No JVD, Normal thyroid  NERVOUS SYSTEM:  Alert & Oriented X3, Good concentration; Motor Strength 5/5 B/L upper and lower extremities; DTRs 2+ intact and symmetric  CHEST/LUNG: Clear to percussion bilaterally; No rales, rhonchi, wheezing, or rubs  HEART: Regular rate and rhythm; No murmurs, rubs, or gallops  ABDOMEN: Soft, Nontender, Nondistended; Bowel sounds present  EXTREMITIES:  2+ Peripheral Pulses, No clubbing, cyanosis, or edema  LYMPH: No lymphadenopathy noted  SKIN: No rashes or lesions    LABS:                        13.3   6.25  )-----------( 109      ( 05 Feb 2018 07:17 )             41.7     02-05    146<H>  |  111<H>  |  19  ----------------------------<  78  3.6   |  24  |  0.74    Ca    9.0      05 Feb 2018 07:31          CAPILLARY BLOOD GLUCOSE                MEDICATIONS  (STANDING):  acetazolamide   ER Capsule 500 milliGRAM(s) Oral two times a day  ARIPiprazole 20 milliGRAM(s) Oral daily  benztropine 1 milliGRAM(s) Oral two times a day  bethanechol 25 milliGRAM(s) Oral three times a day  brimonidine 0.2% Ophthalmic Solution 1 Drop(s) Both EYES three times a day  citalopram 40 milliGRAM(s) Oral daily  guaiFENesin    Syrup 100 milliGRAM(s) Oral every 6 hours  latanoprost 0.005% Ophthalmic Solution 1 Drop(s) Both EYES at bedtime  multivitamin 1 Tablet(s) Oral daily  polyethylene glycol 3350 17 Gram(s) Oral daily  tamsulosin 0.4 milliGRAM(s) Oral at bedtime  timolol 0.5% Solution 1 Drop(s) Both EYES two times a day  valproic  acid Syrup 1000 milliGRAM(s) Oral two times a day    MEDICATIONS  (PRN):      Care Discussed with Consultants/Other Providers [ ] YES  [ ] NO
Patient is a 62y old  Male who presents with a chief complaint of sob / coughing , fever (30 Jan 2018 13:59)    pt. seen and examined, NAD    INTERVAL HPI/OVERNIGHT EVENTS:  T(C): 37.1 (02-06-18 @ 16:14), Max: 37.1 (02-06-18 @ 07:28)  HR: 74 (02-06-18 @ 16:14) (59 - 74)  BP: 98/64 (02-06-18 @ 16:14) (98/64 - 113/74)  RR: 18 (02-06-18 @ 16:14) (18 - 18)  SpO2: 96% (02-06-18 @ 16:14) (96% - 99%)  Wt(kg): --  I&O's Summary    05 Feb 2018 07:01  -  06 Feb 2018 07:00  --------------------------------------------------------  IN: 600 mL / OUT: 0 mL / NET: 600 mL    06 Feb 2018 07:01  -  06 Feb 2018 18:40  --------------------------------------------------------  IN: 950 mL / OUT: 0 mL / NET: 950 mL        PAST MEDICAL & SURGICAL HISTORY:  PTSD (post-traumatic stress disorder)  Constipation  Mood disorder  Mental retardation  S/P hip replacement      SOCIAL HISTORY  Alcohol:  Tobacco:  Illicit substance use:    FAMILY HISTORY:    REVIEW OF SYSTEMS:  CONSTITUTIONAL: No fever, weight loss, or fatigue  EYES: No eye pain, visual disturbances, or discharge  ENMT:  No difficulty hearing, tinnitus, vertigo; No sinus or throat pain  NECK: No pain or stiffness  RESPIRATORY: No cough, wheezing, chills or hemoptysis; No shortness of breath  CARDIOVASCULAR: No chest pain, palpitations, dizziness, or leg swelling  GASTROINTESTINAL: No abdominal or epigastric pain. No nausea, vomiting, or hematemesis; No diarrhea or constipation. No melena or hematochezia.  GENITOURINARY: No dysuria, frequency, hematuria, or incontinence  NEUROLOGICAL: No headaches, memory loss, loss of strength, numbness, or tremors  SKIN: No itching, burning, rashes, or lesions   LYMPH NODES: No enlarged glands  ENDOCRINE: No heat or cold intolerance; No hair loss  MUSCULOSKELETAL: No joint pain or swelling; No muscle, back, or extremity pain  PSYCHIATRIC: No depression, anxiety, mood swings, or difficulty sleeping  HEME/LYMPH: No easy bruising, or bleeding gums  ALLERY AND IMMUNOLOGIC: No hives or eczema    RADIOLOGY & ADDITIONAL TESTS:    Imaging Personally Reviewed:  [ ] YES  [ ] NO    Consultant(s) Notes Reviewed:  [ ] YES  [ ] NO    PHYSICAL EXAM:  GENERAL: NAD, well-groomed, well-developed  HEAD:  Atraumatic, Normocephalic  EYES: EOMI, PERRLA, conjunctiva and sclera clear  ENMT: No tonsillar erythema, exudates, or enlargement; Moist mucous membranes, Good dentition, No lesions  NECK: Supple, No JVD, Normal thyroid  NERVOUS SYSTEM:  Alert & Oriented X3, Good concentration; Motor Strength 5/5 B/L upper and lower extremities; DTRs 2+ intact and symmetric  CHEST/LUNG: Clear to percussion bilaterally; No rales, rhonchi, wheezing, or rubs  HEART: Regular rate and rhythm; No murmurs, rubs, or gallops  ABDOMEN: Soft, Nontender, Nondistended; Bowel sounds present  EXTREMITIES:  2+ Peripheral Pulses, No clubbing, cyanosis, or edema  LYMPH: No lymphadenopathy noted  SKIN: No rashes or lesions    LABS:                        13.3   6.25  )-----------( 109      ( 05 Feb 2018 07:17 )             41.7     02-06    144  |  108  |  20  ----------------------------<  95  4.2   |  24  |  0.66    Ca    8.7      06 Feb 2018 08:05          CAPILLARY BLOOD GLUCOSE                MEDICATIONS  (STANDING):  acetazolamide   ER Capsule 500 milliGRAM(s) Oral two times a day  ARIPiprazole 20 milliGRAM(s) Oral daily  benztropine 1 milliGRAM(s) Oral two times a day  bethanechol 25 milliGRAM(s) Oral three times a day  brimonidine 0.2% Ophthalmic Solution 1 Drop(s) Both EYES three times a day  citalopram 40 milliGRAM(s) Oral daily  guaiFENesin    Syrup 100 milliGRAM(s) Oral every 6 hours  latanoprost 0.005% Ophthalmic Solution 1 Drop(s) Both EYES at bedtime  multivitamin 1 Tablet(s) Oral daily  polyethylene glycol 3350 17 Gram(s) Oral daily  tamsulosin 0.4 milliGRAM(s) Oral at bedtime  timolol 0.5% Solution 1 Drop(s) Both EYES two times a day  valproic  acid Syrup 1000 milliGRAM(s) Oral two times a day    MEDICATIONS  (PRN):      Care Discussed with Consultants/Other Providers [ ] YES  [ ] NO
Patient is a 62y old  Male who presents with a chief complaint of sob / coughing , fever (30 Jan 2018 13:59)    pt. seen and examined, NAD. awaiting placement    INTERVAL HPI/OVERNIGHT EVENTS:  T(C): 37.2 (02-07-18 @ 21:28), Max: 37.2 (02-07-18 @ 21:28)  HR: 69 (02-07-18 @ 21:28) (55 - 74)  BP: 94/60 (02-07-18 @ 21:28) (91/56 - 102/70)  RR: 18 (02-07-18 @ 21:28) (16 - 18)  SpO2: 94% (02-07-18 @ 21:28) (94% - 96%)  Wt(kg): --  I&O's Summary    06 Feb 2018 07:01  -  07 Feb 2018 07:00  --------------------------------------------------------  IN: 950 mL / OUT: 0 mL / NET: 950 mL    07 Feb 2018 07:01  -  08 Feb 2018 03:56  --------------------------------------------------------  IN: 300 mL / OUT: 0 mL / NET: 300 mL        PAST MEDICAL & SURGICAL HISTORY:  PTSD (post-traumatic stress disorder)  Constipation  Mood disorder  Mental retardation  S/P hip replacement      SOCIAL HISTORY  Alcohol:  Tobacco:  Illicit substance use:    FAMILY HISTORY:    REVIEW OF SYSTEMS:  CONSTITUTIONAL: No fever, weight loss, or fatigue  EYES: No eye pain, visual disturbances, or discharge  ENMT:  No difficulty hearing, tinnitus, vertigo; No sinus or throat pain  NECK: No pain or stiffness  RESPIRATORY: No cough, wheezing, chills or hemoptysis; No shortness of breath  CARDIOVASCULAR: No chest pain, palpitations, dizziness, or leg swelling  GASTROINTESTINAL: No abdominal or epigastric pain. No nausea, vomiting, or hematemesis; No diarrhea or constipation. No melena or hematochezia.  GENITOURINARY: No dysuria, frequency, hematuria, or incontinence  NEUROLOGICAL: No headaches, memory loss, loss of strength, numbness, or tremors  SKIN: No itching, burning, rashes, or lesions   LYMPH NODES: No enlarged glands  ENDOCRINE: No heat or cold intolerance; No hair loss  MUSCULOSKELETAL: No joint pain or swelling; No muscle, back, or extremity pain  PSYCHIATRIC: No depression, anxiety, mood swings, or difficulty sleeping  HEME/LYMPH: No easy bruising, or bleeding gums  ALLERY AND IMMUNOLOGIC: No hives or eczema    RADIOLOGY & ADDITIONAL TESTS:    Imaging Personally Reviewed:  [ ] YES  [ ] NO    Consultant(s) Notes Reviewed:  [ ] YES  [ ] NO    PHYSICAL EXAM:  GENERAL: NAD, well-groomed, well-developed  HEAD:  Atraumatic, Normocephalic  EYES: EOMI, PERRLA, conjunctiva and sclera clear  ENMT: No tonsillar erythema, exudates, or enlargement; Moist mucous membranes, Good dentition, No lesions  NECK: Supple, No JVD, Normal thyroid  NERVOUS SYSTEM:  Alert & Oriented X3, Good concentration; Motor Strength 5/5 B/L upper and lower extremities; DTRs 2+ intact and symmetric  CHEST/LUNG: Clear to percussion bilaterally; No rales, rhonchi, wheezing, or rubs  HEART: Regular rate and rhythm; No murmurs, rubs, or gallops  ABDOMEN: Soft, Nontender, Nondistended; Bowel sounds present  EXTREMITIES:  2+ Peripheral Pulses, No clubbing, cyanosis, or edema  LYMPH: No lymphadenopathy noted  SKIN: No rashes or lesions    LABS:    02-06    144  |  108  |  20  ----------------------------<  95  4.2   |  24  |  0.66    Ca    8.7      06 Feb 2018 08:05          CAPILLARY BLOOD GLUCOSE                MEDICATIONS  (STANDING):  acetazolamide   ER Capsule 500 milliGRAM(s) Oral two times a day  ARIPiprazole 20 milliGRAM(s) Oral daily  benztropine 1 milliGRAM(s) Oral two times a day  bethanechol 25 milliGRAM(s) Oral three times a day  brimonidine 0.2% Ophthalmic Solution 1 Drop(s) Both EYES three times a day  citalopram 40 milliGRAM(s) Oral daily  guaiFENesin    Syrup 100 milliGRAM(s) Oral every 6 hours  latanoprost 0.005% Ophthalmic Solution 1 Drop(s) Both EYES at bedtime  multivitamin 1 Tablet(s) Oral daily  polyethylene glycol 3350 17 Gram(s) Oral daily  tamsulosin 0.4 milliGRAM(s) Oral at bedtime  timolol 0.5% Solution 1 Drop(s) Both EYES two times a day  valproic  acid Syrup 1000 milliGRAM(s) Oral two times a day    MEDICATIONS  (PRN):      Care Discussed with Consultants/Other Providers [ ] YES  [ ] NO
Patient is a 62y old  Male who presents with a chief complaint of sob / coughing , fever (30 Jan 2018 13:59)      INTERVAL HPI/OVERNIGHT EVENTS:  T(C): 37 (02-01-18 @ 22:00), Max: 37.1 (02-01-18 @ 08:16)  HR: 93 (02-01-18 @ 22:00) (56 - 107)  BP: 93/61 (02-01-18 @ 22:00) (81/49 - 105/73)  RR: 18 (02-01-18 @ 22:00) (18 - 18)  SpO2: 96% (02-01-18 @ 22:00) (93% - 99%)  Wt(kg): --  I&O's Summary    31 Jan 2018 07:01  -  01 Feb 2018 07:00  --------------------------------------------------------  IN: 540 mL / OUT: 0 mL / NET: 540 mL    01 Feb 2018 07:01  -  02 Feb 2018 02:00  --------------------------------------------------------  IN: 420 mL / OUT: 0 mL / NET: 420 mL        PAST MEDICAL & SURGICAL HISTORY:  PTSD (post-traumatic stress disorder)  Constipation  Mood disorder  Mental retardation  S/P hip replacement      SOCIAL HISTORY  Alcohol:  Tobacco:  Illicit substance use:    FAMILY HISTORY:    REVIEW OF SYSTEMS:  CONSTITUTIONAL: No fever, weight loss, or fatigue  EYES: No eye pain, visual disturbances, or discharge  ENMT:  No difficulty hearing, tinnitus, vertigo; No sinus or throat pain  NECK: No pain or stiffness  RESPIRATORY: No cough, wheezing, chills or hemoptysis; No shortness of breath  CARDIOVASCULAR: No chest pain, palpitations, dizziness, or leg swelling  GASTROINTESTINAL: No abdominal or epigastric pain. No nausea, vomiting, or hematemesis; No diarrhea or constipation. No melena or hematochezia.  GENITOURINARY: No dysuria, frequency, hematuria, or incontinence  NEUROLOGICAL: No headaches, memory loss, loss of strength, numbness, or tremors  SKIN: No itching, burning, rashes, or lesions   LYMPH NODES: No enlarged glands  ENDOCRINE: No heat or cold intolerance; No hair loss  MUSCULOSKELETAL: No joint pain or swelling; No muscle, back, or extremity pain  PSYCHIATRIC: No depression, anxiety, mood swings, or difficulty sleeping  HEME/LYMPH: No easy bruising, or bleeding gums  ALLERY AND IMMUNOLOGIC: No hives or eczema    RADIOLOGY & ADDITIONAL TESTS:    Imaging Personally Reviewed:  [ ] YES  [ ] NO    Consultant(s) Notes Reviewed:  [ ] YES  [ ] NO    PHYSICAL EXAM:  GENERAL: NAD, well-groomed, well-developed  HEAD:  Atraumatic, Normocephalic  EYES: EOMI, PERRLA, conjunctiva and sclera clear  ENMT: No tonsillar erythema, exudates, or enlargement; Moist mucous membranes, Good dentition, No lesions  NECK: Supple, No JVD, Normal thyroid  NERVOUS SYSTEM:  Alert & Oriented X3, Good concentration; Motor Strength 5/5 B/L upper and lower extremities; DTRs 2+ intact and symmetric  CHEST/LUNG: Clear to percussion bilaterally; No rales, rhonchi, wheezing, or rubs  HEART: Regular rate and rhythm; No murmurs, rubs, or gallops  ABDOMEN: Soft, Nontender, Nondistended; Bowel sounds present  EXTREMITIES:  2+ Peripheral Pulses, No clubbing, cyanosis, or edema  LYMPH: No lymphadenopathy noted  SKIN: No rashes or lesions    LABS:                        14.2   6.3   )-----------( 74       ( 01 Feb 2018 09:28 )             42.8     02-01    143  |  109<H>  |  14  ----------------------------<  86  4.4   |  25  |  0.83    Ca    8.9      01 Feb 2018 09:28          CAPILLARY BLOOD GLUCOSE                MEDICATIONS  (STANDING):  acetazolamide   ER Capsule 500 milliGRAM(s) Oral two times a day  ARIPiprazole 20 milliGRAM(s) Oral daily  benztropine 1 milliGRAM(s) Oral two times a day  bethanechol 25 milliGRAM(s) Oral three times a day  brimonidine 0.2% Ophthalmic Solution 1 Drop(s) Both EYES three times a day  citalopram 40 milliGRAM(s) Oral daily  latanoprost 0.005% Ophthalmic Solution 1 Drop(s) Both EYES at bedtime  LORazepam     Tablet 0.5 milliGRAM(s) Oral at bedtime  multivitamin 1 Tablet(s) Oral daily  polyethylene glycol 3350 17 Gram(s) Oral daily  tamsulosin 0.4 milliGRAM(s) Oral at bedtime  timolol 0.5% Solution 1 Drop(s) Both EYES two times a day  valproic  acid Syrup 1000 milliGRAM(s) Oral two times a day    MEDICATIONS  (PRN):  guaiFENesin    Syrup 100 milliGRAM(s) Oral every 6 hours PRN Cough  oxyCODONE    5 mG/acetaminophen 325 mG 2 Tablet(s) Oral every 6 hours PRN Moderate Pain (4 - 6)      Care Discussed with Consultants/Other Providers [ ] YES  [ ] NO
Patient is a 62y old  Male who presents with a chief complaint of sob / coughing , fever (30 Jan 2018 13:59)      INTERVAL HPI/OVERNIGHT EVENTS:  T(C): 37 (02-04-18 @ 08:23), Max: 37.3 (02-03-18 @ 16:54)  HR: 58 (02-04-18 @ 08:23) (58 - 96)  BP: 96/60 (02-04-18 @ 08:23) (94/63 - 97/64)  RR: 17 (02-04-18 @ 08:23) (16 - 18)  SpO2: 96% (02-04-18 @ 08:23) (95% - 99%)  Wt(kg): --  I&O's Summary    03 Feb 2018 07:01  -  04 Feb 2018 07:00  --------------------------------------------------------  IN: 680 mL / OUT: 0 mL / NET: 680 mL        PAST MEDICAL & SURGICAL HISTORY:  PTSD (post-traumatic stress disorder)  Constipation  Mood disorder  Mental retardation  S/P hip replacement      SOCIAL HISTORY  Alcohol:  Tobacco:  Illicit substance use:    FAMILY HISTORY:    REVIEW OF SYSTEMS:  CONSTITUTIONAL: No fever, weight loss, or fatigue  EYES: No eye pain, visual disturbances, or discharge  ENMT:  No difficulty hearing, tinnitus, vertigo; No sinus or throat pain  NECK: No pain or stiffness  RESPIRATORY: No cough, wheezing, chills or hemoptysis; No shortness of breath  CARDIOVASCULAR: No chest pain, palpitations, dizziness, or leg swelling  GASTROINTESTINAL: No abdominal or epigastric pain. No nausea, vomiting, or hematemesis; No diarrhea or constipation. No melena or hematochezia.  GENITOURINARY: No dysuria, frequency, hematuria, or incontinence  NEUROLOGICAL: No headaches, memory loss, loss of strength, numbness, or tremors  SKIN: No itching, burning, rashes, or lesions   LYMPH NODES: No enlarged glands  ENDOCRINE: No heat or cold intolerance; No hair loss  MUSCULOSKELETAL: No joint pain or swelling; No muscle, back, or extremity pain  PSYCHIATRIC: No depression, anxiety, mood swings, or difficulty sleeping  HEME/LYMPH: No easy bruising, or bleeding gums  ALLERY AND IMMUNOLOGIC: No hives or eczema    RADIOLOGY & ADDITIONAL TESTS:    Imaging Personally Reviewed:  [ ] YES  [ ] NO    Consultant(s) Notes Reviewed:  [ ] YES  [ ] NO    PHYSICAL EXAM:  GENERAL: NAD, well-groomed, well-developed  HEAD:  Atraumatic, Normocephalic  EYES: EOMI, PERRLA, conjunctiva and sclera clear  ENMT: No tonsillar erythema, exudates, or enlargement; Moist mucous membranes, Good dentition, No lesions  NECK: Supple, No JVD, Normal thyroid  NERVOUS SYSTEM:  Alert & Oriented X3, Good concentration; Motor Strength 5/5 B/L upper and lower extremities; DTRs 2+ intact and symmetric  CHEST/LUNG: Clear to percussion bilaterally; No rales, rhonchi, wheezing, or rubs  HEART: Regular rate and rhythm; No murmurs, rubs, or gallops  ABDOMEN: Soft, Nontender, Nondistended; Bowel sounds present  EXTREMITIES:  2+ Peripheral Pulses, No clubbing, cyanosis, or edema  LYMPH: No lymphadenopathy noted  SKIN: No rashes or lesions    LABS:                        13.3   5.39  )-----------( 111      ( 03 Feb 2018 10:31 )             40.0     02-03    145  |  108  |  17  ----------------------------<  85  3.9   |  23  |  0.66    Ca    9.2      03 Feb 2018 10:31          CAPILLARY BLOOD GLUCOSE                MEDICATIONS  (STANDING):  acetazolamide   ER Capsule 500 milliGRAM(s) Oral two times a day  ARIPiprazole 20 milliGRAM(s) Oral daily  benztropine 1 milliGRAM(s) Oral two times a day  bethanechol 25 milliGRAM(s) Oral three times a day  brimonidine 0.2% Ophthalmic Solution 1 Drop(s) Both EYES three times a day  citalopram 40 milliGRAM(s) Oral daily  latanoprost 0.005% Ophthalmic Solution 1 Drop(s) Both EYES at bedtime  multivitamin 1 Tablet(s) Oral daily  polyethylene glycol 3350 17 Gram(s) Oral daily  tamsulosin 0.4 milliGRAM(s) Oral at bedtime  timolol 0.5% Solution 1 Drop(s) Both EYES two times a day  valproic  acid Syrup 1000 milliGRAM(s) Oral two times a day    MEDICATIONS  (PRN):  guaiFENesin    Syrup 100 milliGRAM(s) Oral every 6 hours PRN Cough      Care Discussed with Consultants/Other Providers [ ] YES  [ ] NO
Patient is a 62y old  Male who presents with a chief complaint of sob / coughing , fever (30 Jan 2018 13:59)    pt. seen and examined, denies any c/o.     INTERVAL HPI/OVERNIGHT EVENTS:  T(C): 36.4 (02-02-18 @ 22:26), Max: 36.5 (02-02-18 @ 16:07)  HR: 61 (02-02-18 @ 22:26) (60 - 96)  BP: 101/72 (02-02-18 @ 22:26) (91/63 - 112/63)  RR: 18 (02-02-18 @ 22:26) (18 - 20)  SpO2: 98% (02-02-18 @ 22:26) (97% - 98%)  Wt(kg): --  I&O's Summary    01 Feb 2018 07:01  -  02 Feb 2018 07:00  --------------------------------------------------------  IN: 420 mL / OUT: 0 mL / NET: 420 mL    02 Feb 2018 07:01  -  02 Feb 2018 23:15  --------------------------------------------------------  IN: 360 mL / OUT: 0 mL / NET: 360 mL        PAST MEDICAL & SURGICAL HISTORY:  PTSD (post-traumatic stress disorder)  Constipation  Mood disorder  Mental retardation  S/P hip replacement      SOCIAL HISTORY  Alcohol:  Tobacco:  Illicit substance use:    FAMILY HISTORY:    REVIEW OF SYSTEMS:  CONSTITUTIONAL: No fever, weight loss, or fatigue  EYES: No eye pain, visual disturbances, or discharge  ENMT:  No difficulty hearing, tinnitus, vertigo; No sinus or throat pain  NECK: No pain or stiffness  RESPIRATORY: No cough, wheezing, chills or hemoptysis; No shortness of breath  CARDIOVASCULAR: No chest pain, palpitations, dizziness, or leg swelling  GASTROINTESTINAL: No abdominal or epigastric pain. No nausea, vomiting, or hematemesis; No diarrhea or constipation. No melena or hematochezia.  GENITOURINARY: No dysuria, frequency, hematuria, or incontinence  NEUROLOGICAL: No headaches, memory loss, loss of strength, numbness, or tremors  SKIN: No itching, burning, rashes, or lesions   LYMPH NODES: No enlarged glands  ENDOCRINE: No heat or cold intolerance; No hair loss  MUSCULOSKELETAL: No joint pain or swelling; No muscle, back, or extremity pain  PSYCHIATRIC: No depression, anxiety, mood swings, or difficulty sleeping  HEME/LYMPH: No easy bruising, or bleeding gums  ALLERY AND IMMUNOLOGIC: No hives or eczema    RADIOLOGY & ADDITIONAL TESTS:    Imaging Personally Reviewed:  [ ] YES  [ ] NO    Consultant(s) Notes Reviewed:  [ ] YES  [ ] NO    PHYSICAL EXAM:  GENERAL: NAD, well-groomed, well-developed  HEAD:  Atraumatic, Normocephalic  EYES: EOMI, PERRLA, conjunctiva and sclera clear  ENMT: No tonsillar erythema, exudates, or enlargement; Moist mucous membranes, Good dentition, No lesions  NECK: Supple, No JVD, Normal thyroid  NERVOUS SYSTEM:  Alert & Oriented X3, Good concentration; Motor Strength 5/5 B/L upper and lower extremities; DTRs 2+ intact and symmetric  CHEST/LUNG: Clear to percussion bilaterally; No rales, rhonchi, wheezing, or rubs  HEART: Regular rate and rhythm; No murmurs, rubs, or gallops  ABDOMEN: Soft, Nontender, Nondistended; Bowel sounds present  EXTREMITIES:  2+ Peripheral Pulses, No clubbing, cyanosis, or edema  LYMPH: No lymphadenopathy noted  SKIN: No rashes or lesions    LABS:                        14.2   6.3   )-----------( 74       ( 01 Feb 2018 09:28 )             42.8     02-01    143  |  109<H>  |  14  ----------------------------<  86  4.4   |  25  |  0.83    Ca    8.9      01 Feb 2018 09:28          CAPILLARY BLOOD GLUCOSE                MEDICATIONS  (STANDING):  acetazolamide   ER Capsule 500 milliGRAM(s) Oral two times a day  ARIPiprazole 20 milliGRAM(s) Oral daily  benztropine 1 milliGRAM(s) Oral two times a day  bethanechol 25 milliGRAM(s) Oral three times a day  brimonidine 0.2% Ophthalmic Solution 1 Drop(s) Both EYES three times a day  citalopram 40 milliGRAM(s) Oral daily  latanoprost 0.005% Ophthalmic Solution 1 Drop(s) Both EYES at bedtime  multivitamin 1 Tablet(s) Oral daily  polyethylene glycol 3350 17 Gram(s) Oral daily  tamsulosin 0.4 milliGRAM(s) Oral at bedtime  timolol 0.5% Solution 1 Drop(s) Both EYES two times a day  valproic  acid Syrup 1000 milliGRAM(s) Oral two times a day    MEDICATIONS  (PRN):  guaiFENesin    Syrup 100 milliGRAM(s) Oral every 6 hours PRN Cough  oxyCODONE    5 mG/acetaminophen 325 mG 2 Tablet(s) Oral every 6 hours PRN Moderate Pain (4 - 6)      Care Discussed with Consultants/Other Providers [ ] YES  [ ] NO

## 2018-02-08 NOTE — PROGRESS NOTE ADULT - PROBLEM SELECTOR PLAN 4
c/w home meds

## 2018-02-08 NOTE — PROGRESS NOTE ADULT - PROBLEM SELECTOR PLAN 5
home meds

## 2018-02-09 VITALS
DIASTOLIC BLOOD PRESSURE: 59 MMHG | RESPIRATION RATE: 18 BRPM | OXYGEN SATURATION: 96 % | SYSTOLIC BLOOD PRESSURE: 95 MMHG | HEART RATE: 63 BPM | TEMPERATURE: 98 F

## 2018-02-09 PROCEDURE — 74018 RADEX ABDOMEN 1 VIEW: CPT

## 2018-02-09 PROCEDURE — 97530 THERAPEUTIC ACTIVITIES: CPT

## 2018-02-09 PROCEDURE — 71045 X-RAY EXAM CHEST 1 VIEW: CPT

## 2018-02-09 PROCEDURE — 72190 X-RAY EXAM OF PELVIS: CPT

## 2018-02-09 PROCEDURE — 85027 COMPLETE CBC AUTOMATED: CPT

## 2018-02-09 PROCEDURE — 87798 DETECT AGENT NOS DNA AMP: CPT

## 2018-02-09 PROCEDURE — 72192 CT PELVIS W/O DYE: CPT

## 2018-02-09 PROCEDURE — 73552 X-RAY EXAM OF FEMUR 2/>: CPT

## 2018-02-09 PROCEDURE — 76377 3D RENDER W/INTRP POSTPROCES: CPT

## 2018-02-09 PROCEDURE — 73502 X-RAY EXAM HIP UNI 2-3 VIEWS: CPT

## 2018-02-09 PROCEDURE — 80048 BASIC METABOLIC PNL TOTAL CA: CPT

## 2018-02-09 PROCEDURE — 73562 X-RAY EXAM OF KNEE 3: CPT

## 2018-02-09 PROCEDURE — 87486 CHLMYD PNEUM DNA AMP PROBE: CPT

## 2018-02-09 PROCEDURE — 97162 PT EVAL MOD COMPLEX 30 MIN: CPT

## 2018-02-09 PROCEDURE — 87040 BLOOD CULTURE FOR BACTERIA: CPT

## 2018-02-09 PROCEDURE — 87633 RESP VIRUS 12-25 TARGETS: CPT

## 2018-02-09 PROCEDURE — 81001 URINALYSIS AUTO W/SCOPE: CPT

## 2018-02-09 PROCEDURE — 87581 M.PNEUMON DNA AMP PROBE: CPT

## 2018-02-09 PROCEDURE — 96374 THER/PROPH/DIAG INJ IV PUSH: CPT

## 2018-02-09 PROCEDURE — 80053 COMPREHEN METABOLIC PANEL: CPT

## 2018-02-09 PROCEDURE — 99285 EMERGENCY DEPT VISIT HI MDM: CPT | Mod: 25

## 2018-02-09 PROCEDURE — 97116 GAIT TRAINING THERAPY: CPT

## 2018-02-09 PROCEDURE — 97112 NEUROMUSCULAR REEDUCATION: CPT

## 2018-02-09 RX ADMIN — Medication 100 MILLIGRAM(S): at 12:23

## 2018-02-09 RX ADMIN — Medication 1000 MILLIGRAM(S): at 05:53

## 2018-02-09 RX ADMIN — ACETAZOLAMIDE 500 MILLIGRAM(S): 250 TABLET ORAL at 05:51

## 2018-02-09 RX ADMIN — Medication 1 TABLET(S): at 12:23

## 2018-02-09 RX ADMIN — CITALOPRAM 40 MILLIGRAM(S): 10 TABLET, FILM COATED ORAL at 12:23

## 2018-02-09 RX ADMIN — Medication 100 MILLIGRAM(S): at 05:53

## 2018-02-09 RX ADMIN — Medication 1 DROP(S): at 05:52

## 2018-02-09 RX ADMIN — Medication 25 MILLIGRAM(S): at 05:52

## 2018-02-09 RX ADMIN — BRIMONIDINE TARTRATE 1 DROP(S): 2 SOLUTION/ DROPS OPHTHALMIC at 05:51

## 2018-02-09 RX ADMIN — Medication 1 MILLIGRAM(S): at 05:51

## 2018-02-09 RX ADMIN — ARIPIPRAZOLE 20 MILLIGRAM(S): 15 TABLET ORAL at 12:23

## 2018-04-07 ENCOUNTER — EMERGENCY (EMERGENCY)
Facility: HOSPITAL | Age: 63
LOS: 1 days | Discharge: HOME | End: 2018-04-07
Admitting: EMERGENCY MEDICINE
Payer: MEDICARE

## 2018-04-07 VITALS
SYSTOLIC BLOOD PRESSURE: 101 MMHG | TEMPERATURE: 98 F | RESPIRATION RATE: 18 BRPM | HEART RATE: 76 BPM | DIASTOLIC BLOOD PRESSURE: 63 MMHG | OXYGEN SATURATION: 99 %

## 2018-04-07 DIAGNOSIS — Z96.649 PRESENCE OF UNSPECIFIED ARTIFICIAL HIP JOINT: Chronic | ICD-10-CM

## 2018-04-07 DIAGNOSIS — Z98.890 OTHER SPECIFIED POSTPROCEDURAL STATES: Chronic | ICD-10-CM

## 2018-04-07 DIAGNOSIS — F43.21 ADJUSTMENT DISORDER WITH DEPRESSED MOOD: ICD-10-CM

## 2018-04-07 PROCEDURE — 99284 EMERGENCY DEPT VISIT MOD MDM: CPT

## 2018-04-07 PROCEDURE — 90792 PSYCH DIAG EVAL W/MED SRVCS: CPT

## 2018-04-07 RX ORDER — HALOPERIDOL DECANOATE 100 MG/ML
5 INJECTION INTRAMUSCULAR ONCE
Qty: 0 | Refills: 0 | Status: COMPLETED | OUTPATIENT
Start: 2018-04-07 | End: 2018-04-07

## 2018-04-07 RX ADMIN — HALOPERIDOL DECANOATE 5 MILLIGRAM(S): 100 INJECTION INTRAMUSCULAR at 15:54

## 2018-04-07 RX ADMIN — Medication 2 MILLIGRAM(S): at 15:54

## 2018-04-07 NOTE — ED PROVIDER NOTE - MEDICAL DECISION MAKING DETAILS
63 y/o M  hx Depression, HTN, PTSD, Intellectual Disability, Hx  Tracheotomy   Medical evaluation performed. There is no clinical evidence of intoxication or any acute medical problem requiring immediate intervention. Patient is awaiting psychiatric consultation. Final disposition will be determined by psychiatrist.
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
## 2018-04-07 NOTE — ED BEHAVIORAL HEALTH ASSESSMENT NOTE - RISK ASSESSMENT
The patient has chronic risk factors, including reported history of psychosis, reported history of mood disorder, reported history of PTSD, reported history of SIB. He has acute risk factors, including recent suicidal statement. He has protective factors, including supportive staff at his extended care facility and future orientation. Patient does not have SI/HI/I/P currently and is not an acute risk to himself or others.

## 2018-04-07 NOTE — ED PROVIDER NOTE - CARE PLAN
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
## 2018-04-07 NOTE — ED BEHAVIORAL HEALTH ASSESSMENT NOTE - SAFETY PLAN DETAILS
Advised to call 911, inform staff or immediately return to ER if patient should have SI, HI or feel at risk in anyway.

## 2018-04-07 NOTE — ED ADULT TRIAGE NOTE - CHIEF COMPLAINT QUOTE
from NH, with SI. as per [facility] pt sts wants to hurt himself. pmhx PTSD, depression, intellectual disability

## 2018-04-07 NOTE — ED PROVIDER NOTE - OBJECTIVE STATEMENT
63 y/o M  hx Depression, HTN, PTSD, Intellectual Disability, Hx  Tracheotomy   BIBA from [facility]  w c/o  agitation, aggression and behavioral issues . States " I need solid food and they wouldn't give me". Admits to verbalizing suicidal ideation out of frustration. Denies actual plan.  Denies falling, punching   or kicking any objects.  Denies pain, SOB, fever, chills, chest/ abdominal discomfort. Denies SI/HI/AH/VH. No history of alcohol or illicit drug use. No evidence of physical injuries, broken skin or ,deformities.

## 2018-04-07 NOTE — ED BEHAVIORAL HEALTH ASSESSMENT NOTE - CURRENT MEDICATION
Citalopram 40mg daily, Abilify 20mg daily, Ativan 0.5mg daily, Cogentin 1mg BID, Melatonin 3mg QHS, Depakene for seizures, Bisacodyl, Brimonidine eye drops, cholecalciferol, Acetazolamide ER, Colace, Latanoprost eye drops, Multivitamin, Calcium Carbonate/Vitamin D, Miralax, Senna, Tamsulosin, Timolol eye drops, Urecholine, Tylenol

## 2018-04-07 NOTE — ED BEHAVIORAL HEALTH ASSESSMENT NOTE - SUMMARY
62 year old male with reported history of mood disorder, psychotic disorder, PTSD, anxiety, mild intellectual disability, presenting from [facility] after endorsing SI in the context of dissatisfaction with his diet restrictions.  Patient does not have SI currently, although he endorses intermittent sadness related to his situation, and expresses not liking his current living situation.  Although patient does have a past history of self injurious behavior patient does not endorse any plan or intent to injure himself in a lethal way, and he has a good support network in the staff at the [facility] facility. While he relates his thought of hurting himself to the fact that he was not giving what he wants and endorses passive suicidal ideation at times there is no evidence of active suicidal intent or plan, and his threat to jump out the window is impossible given the fact of patient being unable to ambulate as well.  The patient likely has an adjustment disorder with depressed mood related to his situation, causing him to express suicidal statements.  He does not endorse a thought of injuring himself in a lethal way and has no thoughts of injuring others.  He is not an acute risk to himself or others.  Patient is not thought to be at risk from his staff and does not meet criteria for inpatient psychiatric hospitalization at this time. 62 year old male with reported history of depressive disorder, psychotic disorder, PTSD, anxiety, mild intellectual disability, presenting from [facility] after endorsing SI in the context of dissatisfaction with his diet restrictions.  Patient does not have SI currently, although he endorses intermittent sadness related to his situation, and expresses not liking his current living situation.  Patient does have a past history of self injurious behavior but does not endorse any plan or intent to injure himself at this time and has a good support network at the [facility] facility.  While he relates his thought of hurting himself to the fact that he was not given what he wants and endorses passive suicidal ideation at times there is no evidence of active suicidal intent or plan, and his threat to jump out the window is impossible given the fact of patient being unable to ambulate as well.  Upon evaluation patient's expression of SI appears to be connected to his history of behavioral outbursts and not connected to a meaningful wish today.  Despite being asked multiple times he never offers any real intent/plan that would result in suicide.  This is all likely related to his intellectual limitations.  He gives vague report of auditory hallucinations but does not appear grossly psychotic on exam.  Also patient does not endorse symptoms consistent with a depressive episode.  The patient likely has an adjustment disorder with depressed mood related to his situation, causing him to express suicidal statements.  He does not endorse a thought of injuring himself in a lethal way and has no thoughts of injuring others.  He is not an acute risk to himself or others at this time.  Patient does not meet criteria for inpatient psychiatric hospitalization at this time.  Patient would be best served by returning to structured/supportive nursing home and f/u with providers there.

## 2018-04-07 NOTE — ED BEHAVIORAL HEALTH ASSESSMENT NOTE - OTHER
peers not ambulatory, in hospital bed Provided support, reassurance and psychoeducation. reports AH, not command type, but no objective evidence of internal stimulation

## 2018-04-07 NOTE — ED BEHAVIORAL HEALTH ASSESSMENT NOTE - OTHER PAST PSYCHIATRIC HISTORY (INCLUDE DETAILS REGARDING ONSET, COURSE OF ILLNESS, INPATIENT/OUTPATIENT TREATMENT)
Reported history of depression, PTSD, one past psychiatric hospitalization, one past suicide attempt reported years ago. Reported history of depression, PTSD, psychosis, one past psychiatric hospitalization, one past suicide attempt reported years ago. Reported history of depression, PTSD, psychosis, one past psychiatric hospitalization, one past suicide attempt reported years ago.  Was previously living in a supported psychiatric residence through [facility] in past. Reported history of depression, PTSD, psychosis, one past psychiatric hospitalization, one past suicide attempt reported years ago.  Was previously living in a supported psychiatric residence through [facility] in past.  Reported history of behavioral outbursts.

## 2018-04-07 NOTE — ED PROVIDER NOTE - PRINCIPAL DIAGNOSIS
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

## 2018-04-07 NOTE — ED BEHAVIORAL HEALTH NOTE - BEHAVIORAL HEALTH NOTE
Nursing Supervisor at [facility] [phone]    Patient said today that after he received a meal he did not like he would kill himself by jumping out of the window. As per nursing supervisor patient does not walk and would therefore not be able to take such action. Supervisor reports that he made this statement 2-3 days ago as well. He denies patient feeling or appearing depressed and said he's pleasant and describes him as a "nice mainor". He has not been aggressive or violent with any staff. He socializes appropriately with others. Patient has made complaints that he was hearing voices but does not provide more detail about it. There are no safety concerns with his return to the nursing home at this time.

## 2018-04-07 NOTE — ED BEHAVIORAL HEALTH ASSESSMENT NOTE - HPI (INCLUDE ILLNESS QUALITY, SEVERITY, DURATION, TIMING, CONTEXT, MODIFYING FACTORS, ASSOCIATED SIGNS AND SYMPTOMS)
This is a 62 year old single  male with a reported history of depression, past psychiatric hospitalization years ago, no reported suicide attempts, no reports of violent behavior, no legal history, no substance abuse history, currently not able to ambulate in long term care at [facility] with a past medical history of unspecified intellectual disabilities, s/p joint replacement surgery, generalized muscle weakness, leg pain is sent to [facility] ER by EMS secondary to suicidal statement made by patient.  Patient reported made statement "I want to hurt myself" in the context of voiced frustration with his diet.  Patient had made threat to jump out the window but denies having intent to actually act on this threat.  Patient endorses frustration with medical condition and states he doesn't like being in the facility and admits he was upset with his diet.  As per paperwork provided by facility, nursing notes section reports:  resident stated, "I want to hurt myself."  When asked why resident stated "because they won't give me what I want to eat, I want a sandwich."         See below for collateral obtained from nursing supervisor at [facility], also noted on BH note in chart.    Patient said today that after he received a meal he did not like he would kill himself by jumping out of the window. As per nursing supervisor patient does not walk and would therefore not be able to take such action. Supervisor reports that he made this statement 2-3 days ago as well. He denies patient feeling or appearing depressed and said he's pleasant and describes him as a "nice mainor". He has not been aggressive or violent with any staff. He socializes appropriately with others. Patient has made complaints that he was hearing voices but does not provide more detail about it. There are no safety concerns with his return to the nursing home at this time. This is a 62 year old single  male with a reported history of depression, past psychiatric hospitalization years ago, no reported suicide attempts, no reports of violent behavior, no legal history, no substance abuse history, currently not able to ambulate in long term care at [facility] with a past medical history of unspecified intellectual disabilities, s/p joint replacement surgery, generalized muscle weakness, leg pain is sent to [facility] ER by EMS secondary to suicidal statement made by patient.  Patient reported made statement "I want to hurt myself" in the context of voiced frustration with his diet.  Patient had made threat to jump out the window but denies having intent to actually act on this threat.  Patient endorses frustration with medical condition and states he doesn't like being in the facility and admits he was upset with his diet.  As per paperwork provided by facility, nursing notes section reports:  resident stated, "I want to hurt myself."  When asked why resident stated "because they won't give me what I want to eat, I want a sandwich."  On evaluation in the ED patient laughs about his suicidal statement, denies intent at this time, and requests staying in the hospital as a means to avoid going back to [facility].  Patient states "I don't like it there" and is expressing feeling depressed at times, found to be tearful related to his medical condition with suicidal ideation expressed episodically in a conditional manner as a means to          See below for collateral obtained from nursing supervisor at [facility], also noted on BH note in chart.    Patient said today that after he received a meal he did not like he would kill himself by jumping out of the window. As per nursing supervisor patient does not walk and would therefore not be able to take such action. Supervisor reports that he made this statement 2-3 days ago as well. He denies patient feeling or appearing depressed and said he's pleasant and describes him as a "nice mainor". He has not been aggressive or violent with any staff. He socializes appropriately with others. Patient has made complaints that he was hearing voices but does not provide more detail about it. There are no safety concerns with his return to the nursing home at this time. This is a 62 year old single  male with a reported history of depression, past psychiatric hospitalization years ago, one past reported suicide attempt years ago, no reports of violent behavior, no legal history, no substance abuse history, currently not able to ambulate in long term care at [facility] ER with a past medical history of unspecified intellectual disabilities, s/p joint replacement surgery, generalized muscle weakness, leg pain is sent to [facility] ER by EMS secondary to suicidal statement made by patient.  Patient reported made statement "I want to hurt myself" in the context of voiced frustration with his diet.  Patient had made threat to jump out the window but denies having intent to actually act on this threat.  Patient endorses frustration with medical condition and states he doesn't like being in the facility and admits he was upset with his diet.  As per paperwork provided by facility, nursing notes section reports:  resident stated, "I want to hurt myself."  When asked why resident stated "because they won't give me what I want to eat, I want a sandwich."  On evaluation in the ED patient laughs about his suicidal statement, denies intent at this time, and requests staying in the hospital as a means to avoid going back to [facility] ER.  Patient states "I don't like it there" and is expressing feeling depressed at times, found to be tearful related to his medical condition with suicidal ideation expressed in the context of earlier frustration related to restrictions on diet.  Patient at this time does admit to having passive suicidal ideation at times but no evidence of active suicidal ideation evident at the time of exam, no acute intent or plan, no gestures, no violent thoughts, no homicidal ideation.  There is no evidence of acute anxiety or panic attack symptoms, no manic/hypomanic symptoms, no lability, no acute psychosis, no acute signs or symptoms of dangerousness are noted at this time.  Noted documented history of PTSD as per paperwork provided with no acute symptoms evident, no OCD/eating disorder symptoms present.  Patient does not present as an acute risk to self or others at this time.          See below for collateral obtained from nursing supervisor at [facility], also noted on BH note in chart.    Patient said today that after he received a meal he did not like he would kill himself by jumping out of the window. As per nursing supervisor patient does not walk and would therefore not be able to take such action. Supervisor reports that he made this statement 2-3 days ago as well. He denies patient feeling or appearing depressed and said he's pleasant and describes him as a "nice mainor". He has not been aggressive or violent with any staff. He socializes appropriately with others. Patient has made complaints that he was hearing voices but does not provide more detail about it. There are no safety concerns with his return to the nursing home at this time. This is a 62 year old single  male with a reported history of depression, past psychiatric hospitalization years ago, one past reported suicide attempt years ago, no reports of violent behavior, no legal history, no substance abuse history, wheelchair bound, currently not able to ambulate, in long term care at [facility] ER with a past medical history of unspecified intellectual disabilities, s/p hip replacement surgery, generalized muscle weakness, leg pain is sent to [facility] ER by EMS secondary to suicidal statement made by patient.  Patient reported made statement "I want to hurt myself" in the context of voiced frustration with his diet.  Patient had made threat to jump out the window but denies having intent to actually act on this threat.  Patient endorses frustration with medical condition and states he doesn't like being in the facility and admits he was upset with his diet.  As per paperwork provided by facility, nursing notes section reports:  resident stated, "I want to hurt myself."  When asked why resident stated "because they won't give me what I want to eat, I want a sandwich."  On evaluation in the ED patient laughs about his suicidal statement, denies intent at this time, and requests staying in the hospital as a means to avoid going back to [facility] ER.  Patient states "I don't like it there" and is expressing feeling depressed at times, found to be tearful related to his medical condition with suicidal ideation expressed in the context of earlier frustration related to restrictions on diet.  Patient at this time does admit to having passive suicidal ideation at times but no evidence of active suicidal ideation evident at the time of exam, no acute intent or plan, no gestures, no violent thoughts, no homicidal ideation.  There is no evidence of acute anxiety or panic attack symptoms, no manic/hypomanic symptoms, no lability, no acute psychosis, no acute signs or symptoms of dangerousness are noted at this time.  Noted documented history of PTSD as per paperwork provided with no acute symptoms evident, no OCD/eating disorder symptoms present.  Patient does not present as an acute risk to self or others at this time.          See below for collateral obtained from nursing supervisor at [facility], also noted on BH note in chart.    Patient said today that after he received a meal he did not like he would kill himself by jumping out of the window. As per nursing supervisor patient does not walk and would therefore not be able to take such action. Supervisor reports that he made this statement 2-3 days ago as well. He denies patient feeling or appearing depressed and said he's pleasant and describes him as a "nice mainor". He has not been aggressive or violent with any staff. He socializes appropriately with others. Patient has made complaints that he was hearing voices but does not provide more detail about it. There are no safety concerns with his return to the nursing home at this time. This is a 62 year old single  male with a reported history of depression, psychosis, PTSD, anxiety, past psychiatric hospitalization years ago, one past reported suicide attempt years ago, past report of self injurious behavior as per prior ED BH assessment from 8/29/17 by scratching self with fork and injuring rectum with finger, no reports of violent behavior, no legal history, no substance abuse history, wheelchair bound, currently not able to ambulate, in long term care at [facility] with a past medical history of unspecified intellectual disabilities, s/p hip replacement surgery, generalized muscle weakness, leg pain, constipation is sent to [facility] ER by EMS secondary to suicidal statement made by patient.  Patient reported made statement "I want to hurt myself" in the context of voiced frustration with his diet.  Patient had made threat to jump out the window but denies having intent to actually act on this threat.  Patient endorses frustration with medical condition and states he doesn't like being in the facility and admits he was upset with his diet.  As per paperwork provided by facility, nursing notes section reports:  resident stated, "I want to hurt myself."  When asked why, resident stated "because they won't give me what I want to eat, I want a sandwich."  On evaluation in the ED patient laughs about his suicidal statement, denies intent at this time, and requests staying in the hospital as a means to avoid going back to [facility].  Patient states "I don't like it there" and is expressing feeling depressed at times, found to be tearful related to his medical condition with suicidal ideation expressed in the context of earlier frustration related to restrictions on diet.  Patient at this time does admit to having passive suicidal ideation at times but no evidence of active suicidal ideation evident at the time of exam, no acute intent or plan, no gestures, no violent thoughts, no homicidal ideation.  There is no evidence of acute anxiety or panic attack symptoms, no manic/hypomanic symptoms, no lability, no acute psychosis, no acute signs or symptoms of dangerousness are noted at this time.  Noted documented history of PTSD as per paperwork provided with no acute symptoms evident, no OCD/eating disorder symptoms present.  Patient does not present as an acute risk to self or others at this time.        See below for collateral obtained from nursing supervisor at [facility], also noted in ED BH note in chart.    Patient said today that after he received a meal he did not like he would kill himself by jumping out of the window. As per nursing supervisor patient does not walk and would therefore not be able to take such action. Supervisor reports that he made this statement 2-3 days ago as well. He denies patient feeling or appearing depressed and said he's pleasant and describes him as a "nice mainor". He has not been aggressive or violent with any staff. He socializes appropriately with others. Patient has made complaints that he was hearing voices but does not provide more detail about it. There are no safety concerns with his return to the nursing home at this time. This is a 62 year old single  male with a reported history of depression, psychosis, PTSD, anxiety, past psychiatric hospitalization years ago, one past reported suicide attempt years ago, past report of self injurious behavior as per prior ED BH assessment from 8/29/17 by scratching self with fork and injuring rectum with finger, no reports of violent behavior, no legal history, no substance abuse history, wheelchair bound, currently not able to ambulate, in long term care at [facility] with a past medical history of unspecified intellectual disabilities, s/p hip replacement surgery, generalized muscle weakness, leg pain, constipation is sent to [facility] ER by EMS secondary to suicidal statement made by patient.  Patient reported made statement "I want to hurt myself" in the context of voiced frustration with his diet.  Patient had made threat to jump out the window but denies having intent to actually act on this threat.  Patient endorses frustration with medical condition and states he doesn't like being in the facility and admits he was upset with his diet.  As per paperwork provided by facility, nursing notes section reports:  resident stated, "I want to hurt myself."  When asked why, resident stated "because they won't give me what I want to eat, I want a sandwich."  On evaluation in the ED patient laughs about his suicidal statement, denies intent at this time, and requests staying in the hospital as a means to avoid going back to [facility].  Patient states "I don't like it there" and is expressing feeling depressed at times, found to be tearful related to his medical condition with suicidal ideation expressed in the context of earlier frustration related to restrictions on diet.  Patient at this time does admit to having passive suicidal ideation at times but no evidence of active suicidal ideation evident at the time of exam, no acute intent or plan, no gestures, no violent thoughts, no homicidal ideation.  There is no evidence of acute anxiety or panic attack symptoms, no manic/hypomanic symptoms, no lability, vague report of AH but no objective evidence of psychosis, no command AH endorsed, no VH, no paranoia, no delusions, no acute signs or symptoms of dangerousness are noted at this time.  Noted documented history of PTSD as per paperwork provided with no acute symptoms evident, no OCD/eating disorder symptoms present.  Patient does not present as an acute risk to self or others at this time.        See below for collateral obtained from nursing supervisor at [facility], also noted in ED BH note in chart.    Patient said today that after he received a meal he did not like he would kill himself by jumping out of the window. As per nursing supervisor patient does not walk and would therefore not be able to take such action. Supervisor reports that he made this statement 2-3 days ago as well. He denies patient feeling or appearing depressed and said he's pleasant and describes him as a "nice mainor". He has not been aggressive or violent with any staff. He socializes appropriately with others. Patient has made complaints that he was hearing voices but does not provide more detail about it. There are no safety concerns with his return to the nursing home at this time. This is a 62 year old single  male with a reported history of depression, psychosis, PTSD, anxiety, past psychiatric hospitalization years ago, one past reported suicide attempt years ago, past report of self injurious behavior as per prior ED BH assessment from 8/29/17 by scratching self with fork and injuring rectum with finger, no reports of violent behavior but past history of behavioral outbursts, no legal history, no substance abuse history, wheelchair bound, currently not able to ambulate, in long term care at [facility] with a past medical history of unspecified intellectual disabilities, s/p hip replacement surgery, generalized muscle weakness, leg pain, constipation, sent to [facility] ER by EMS secondary to suicidal statement made by patient to staff.  Patient reportedly made statement "I want to hurt myself" in the context of voiced frustration with his diet.  Patient had made threat to jump out the window but denies having intent to actually act on this threat.  Patient endorses frustration with medical condition and states he doesn't like being in the facility and admits he was upset with his diet.  As per paperwork provided by facility, nursing notes section reports:  resident stated, "I want to hurt myself."  When asked why, resident stated "because they won't give me what I want to eat, I want a sandwich."  On evaluation in the ED patient laughs about his suicidal statement, denies intent at this time, and requests staying in the hospital as a means to avoid going back to [facility].  Patient states "I don't like it there" and is expressing feeling depressed at times, found to be tearful related to his medical condition with suicidal ideation expressed in the context of earlier frustration related to restrictions on diet.  Patient at this time does admit to having passive suicidal ideation at times but no evidence of active suicidal ideation evident at the time of exam, no acute intent or plan, no gestures, no violent thoughts, no homicidal ideation.  Patient with good appetite and no sleep disturbances reported.  There is no evidence of acute anxiety or panic attack symptoms, no manic/hypomanic symptoms, no lability, vague report of AH but no objective evidence of psychosis, no command AH endorsed, no VH, no paranoia, no delusions, no acute signs or symptoms of dangerousness are noted at this time.  Noted documented history of PTSD as per paperwork provided with no acute symptoms evident, no OCD/eating disorder symptoms present.  Patient does not present as an acute risk to self or others at this time.        See below for collateral obtained from nursing supervisor at [facility], also noted in ED BH note in chart.    Patient said today that after he received a meal he did not like he would kill himself by jumping out of the window. As per nursing supervisor patient does not walk and would therefore not be able to take such action. Supervisor reports that he made this statement 2-3 days ago as well. He denies patient feeling or appearing depressed and said he's pleasant and describes him as a "nice mainor". He has not been aggressive or violent with any staff. He socializes appropriately with others. Patient has made complaints that he was hearing voices but does not provide more detail about it. There are no safety concerns with his return to the nursing home at this time.

## 2018-04-09 DIAGNOSIS — F79 UNSPECIFIED INTELLECTUAL DISABILITIES: ICD-10-CM

## 2018-07-23 PROBLEM — M25.561 KNEE PAIN, RIGHT: Status: ACTIVE | Noted: 2018-01-30

## 2019-11-21 NOTE — DISCHARGE NOTE ADULT - NS MD DC FALL RISK RISK
[facility]  IR Pre-Procedure Sedation Assessment    History of snoring or sleep or apnea?    Yes    History of previous problems with anesthesia or sedation  No    Physical Findings:  Neck: nl ROM  CV: RRR  PULM: normal respiratory rate/effo For information on Fall & Injury Prevention, visit [email or web address]

## 2019-12-03 NOTE — PATIENT PROFILE ADULT. - PROVIDER NOTIFICATION
Patient mentioned to [facility] that she needs a back brace while using her power chair. [address] from [place] is requesting visit notes supporting patient needs back brace.      Fax number is [phone] Information could not be obtained

## 2020-06-09 NOTE — PROCEDURE NOTE - NSCOMPLICATION_GEN_A_CORE
06/09/20 1033   Team Meeting   Meeting Type Daily Rounds   Initial Conference Date 06/09/20   Patient/Family Present   Patient Present No   Patient's Family Present No     Daily Rounds Documentation    Team Members Present:   JONATHON Villegas [place], [place]  Dorothey Boeck, SHANIKA Morrissey, RN-Unit Manager  [facility] sam, LISA    Paranoid  Still requesting to see a dietician which doesn't appear necessary at this time  Also asking for podiatry but can do this herself with RN supervision  no complications

## 2020-08-03 NOTE — ED ADULT NURSE NOTE - BREATH SOUNDS, MLM
Patient requesting refill: Xarelto and Protonix   Last OV: 5/12/20  Next OV: No upcoming appointment specified   Last refill: 4/30/20  Disp: 90 tablet Refill: 0     Can be refilled per protocol.      Clear

## 2021-04-20 NOTE — ED ADULT TRIAGE NOTE - TEMPERATURE IN FAHRENHEIT (DEGREES F)
[facility] [place]    Subjective     Chief Complaint   Patient presents with   • Right Hip - Pain        HPI    Alin Lara is a 85 y.o. male who follows up for right hip pain. The patient reports his pain has been intermittent for approximately 2 months but has worsened over the last 2 weeks. Today, the patient endorses pain in the posterior aspect of his right hip and right SI region that radiates into the right thigh. He denies any groin pain.    The patient reports a history of DVT in 10/2019 after a left total knee arthroplasty. He is now anticoagulated on Xarelto. He also notes a history of 3 spine surgeries, performed by Dr. Castellon in the 1980s. He denies any known medication allergies.    I have reviewed the following portions of the patient's history and agree with: History of Present Illness and Review of Systems    Patient Active Problem List   Diagnosis   • Paroxysmal atrial fibrillation (CMS/HCC)   • Basal cell carcinoma of skin   • Impotence of organic origin   • Gastroesophageal reflux disease   • Hyperlipidemia   • Hypertension   • Persistent insomnia   • Knee pain   • Osteoarthritis of lumbar spine   • Adiposity   • Overactive bladder   • Arthralgia of hip   • Impaired glucose tolerance   • Prostatism   • History of pulmonary embolism (CMS/HCC)   • Inflammation of sacroiliac joint (CMS/HCC)   • Generalized osteoarthritis   • Preventative health care   • Vertigo   • TIA (transient ischemic attack)   • Lymphocytic colitis   • Esophageal stricture   • Left shoulder pain   • Primary localized osteoarthrosis of left shoulder region   • Contracture of joint of left shoulder region   • Biceps tendinitis of left upper extremity   • Status post reverse total shoulder replacement, left   • Leukocytosis, likely reactive   • Acute postoperative pain     Past Medical History:   Diagnosis Date   • Basal cell carcinoma    • Blood loss     post op   • ED (erectile dysfunction)     Responsive  to Viagra   • Failed total knee, right (CMS/HCC)    • Fasting hypoglycemia 2016    Hemoglobin A1c normal   • Generalized osteoarthritis    • GERD (gastroesophageal reflux disease)     History esophageal stricture   • History of blood clots    • HNP (herniated nucleus pulposus), lumbar ,     L5 L6   • Hyperglycemia     not diabetic, pt states hes never has high blood sugar per pt    • Hyperlipidemia    • Hypertension    • Microscopic colitis     Positive biopsy - colonoscopy   • OAB (overactive bladder)     Persistent urgency   • Obesity Adulthood     body weight 244 BMI 32   • Painful total knee replacement (CMS/[facility])    • Paroxysmal atrial fibrillation (CMS/HCC)     one episode - cardioverted   • Prostatism    • Pulmonary embolism (CMS/[facility])     after injury and protracted immobilization   • Right knee DJD     Partial knee arthroplasty   • Shoulder dislocation     Repeated episodes   • Sleep disturbances     Frequently requires medication   • Transient cerebral ischemia ,     Negative medical workup on both occasions   • Venous stasis    • Vertigo    • Wears glasses       Past Surgical History:   Procedure Laterality Date   • CARDIOVERSION      Atrial fibrillation   • COLONOSCOPY      2018   • KNEE ARTHROPLASTY Right 2015    Partial    • KNEE ARTHROPLASTY Left    • KNEE ARTHROSCOPY Right 2015    Failed procedure   • LUMBAR DISC SURGERY  2014    Surgery ×2 Ruptured disc   • LUMBAR DISCECTOMY      L5 L6    • SHOULDER SURGERY     • TOTAL SHOULDER ARTHROPLASTY W/ DISTAL CLAVICLE EXCISION Left 10/19/2020    Procedure: TOTAL SHOULDER REVERSE ARTHROPLASTY LEFT;  Surgeon: Tan Miranda MD;  Location: [facility];  Service: Orthopedics;  Laterality: Left;      Family History   Problem Relation Age of Onset   • Dementia Mother          age 94   • Other Father          age 86 of unknown cause   • Rheum arthritis Father    • Coronary artery disease Brother     • No Known Problems Brother    • Sick sinus syndrome Other         Has pacemaker   • Diabetes Paternal Grandmother      Social History     Socioeconomic History   • Marital status:      Spouse name: Not on file   • Number of children: 2   • Years of education: Not on file   • Highest education level: Not on file   Tobacco Use   • Smoking status: Never Smoker   • Smokeless tobacco: Former User     Types: Chew   Substance and Sexual Activity   • Alcohol use: Yes     Alcohol/week: 1.0 standard drinks     Types: 1 Cans of beer per week     Comment: Occasionally   • Drug use: No   • Sexual activity: Yes     Partners: Female     Comment: Monogamous      Current Outpatient Medications on File Prior to Visit   Medication Sig Dispense Refill   • ascorbic acid (VITAMIN C) 1000 MG tablet Take 1 capsule by mouth Daily.     • atenolol (TENORMIN) 50 MG tablet TAKE 1 TABLET BY MOUTH DAILY. 90 tablet 1   • CALCIUM-MAGNESIUM-ZINC PO Take 1 tablet by mouth Daily.     • Cholecalciferol (VITAMIN D-3) 25 MCG (1000 UT) capsule Take  by mouth.     • Coenzyme Q10 200 MG capsule Take 200 mg by mouth Daily. 90 capsule 1   • docusate sodium (Colace) 100 MG capsule Take 1 capsule by mouth 2 (Two) Times a Day. 60 capsule 0   • Multiple Vitamins-Minerals (CENTRUM ADULTS) tablet Take 1-2 tablets by mouth daily.     • oxybutynin XL (DITROPAN-XL) 5 MG 24 hr tablet Take 1 tablet by mouth Every 12 (Twelve) Hours. (Patient taking differently: Take 5 mg by mouth every night at bedtime.) 180 tablet 1   • rivaroxaban (XARELTO) 20 MG tablet Take 1 tablet by mouth Daily. Resume 10/22/2020 30 tablet    • Ropivacine HCl-NaCl (NAROPIN) 12 mg/hr by Peripheral Nerve route Continuous.     • sildenafil (VIAGRA) 100 MG tablet Take 0.5-1 tablets by mouth as needed.     • tamsulosin (FLOMAX) 0.4 MG capsule 24 hr capsule TAKE 1 CAPSULE EVERY 12 HOURS (Patient taking differently: 2 capsules every night at bedtime.) 180 capsule 1   • Zinc 50 MG capsule Take   "by mouth.       No current facility-administered medications on file prior to visit.      Allergies   Allergen Reactions   • Pravastatin Other (See Comments)     Nocturnal leg cramps   • Atorvastatin      Fatigue   • Qsymia [Phentermine-Topiramate] Mental Status Change   • Topiramate Myalgia        Review of Systems     Objective      Physical Exam  /68   Pulse 50   Ht 185.4 cm (72.99\")   Wt 115 kg (253 lb 8.5 oz)   BMI 33.46 kg/m²     Body mass index is 33.46 kg/m².    General:   Mental Status:  Alert   Appearance: Cooperative, in no acute distress   Build and Nutrition: Well-nourished well-developed male   Orientation: Alert and oriented to person, place and time   Posture: Normal   Gait: Slow but nonantalgic    Integument       • Right hip: No skin lesions, rash, or ecchymosis.    Neurologic  • Sensation:       • Right foot:  Intact to light touch on the dorsal and plantar aspects    Motor       • Right lower extremity: 5/5 quadriceps, hamstrings, ankle dorsiflexors, and ankle plantar flexors     Vascular       • Right lower extremity: 2+ dorsalis pedis pulse. Prompt capillary refill.    Lower Extremities  • Right Hip:        • Tenderness: Lateral aspect of the hip       • Swelling: None       • Crepitus: None       • Atrophy: None       • Range of motion:             • External rotation: 20° without groin pain             • Internal rotation: 30° without groin pain             • Flexion: 100°             • Extension: 0°       • Instability: None       • Deformities: None       • Functional Testing:             • Stinchfield Test: Negative             • No leg length discrepancy.    Imaging/Studies  No new radiographs    Assessment and Plan     Diagnoses and all orders for this visit:    1. Trochanteric bursitis of right hip (Primary)  -     XR Hip With or Without Pelvis 2 - 3 View Right  -     Ambulatory Referral to Physical Therapy Evaluate and treat, Ortho  -     Cancel: Large Joint Arthrocentesis: R " greater trochanteric bursa  -     MRI Hip Right Without Contrast; Future        1. Trochanteric bursitis of right hip        The patient and I discussed that his pain is likely multifactorial with components coming from his lumbar spine and trochanteric bursitis. I attempted to perform a right hip trochanteric injection on the patient today, but the needle insertion created extreme pain on the lateral aspect of his hip. I aborted the procedure without doing any injection whatsoever, and we have decided to instead obtain a right hip MRI before proceeding with the injection. We will also add an order for his right hip to his current physical therapy treatment.      Return in about 6 weeks (around 5/31/2021) for After Imaging Study.      Scribed for Salo Peña MD by Maria Guadalupe Iqbal  04/20/21   17:49 EDT    I have personally performed the services described in this document as scribed by the above individual, and it is both accurate and complete.  Salo Peña MD  4/21/2021  12:36 EDT         98.7

## 2021-05-20 NOTE — ED BEHAVIORAL HEALTH ASSESSMENT NOTE - NS ED BHA SUICIDALITY PRESENT CURRENT INTENT SELF INJURIOUS BEHAVIOR
Past 6 months/Lifetime Helical Rim Advancement Flap Text: The defect edges were debeveled with a #15 blade scalpel.  Given the location of the defect and the proximity to free margins (helical rim) a double helical rim advancement flap was deemed most appropriate.  Using a sterile surgical marker, the appropriate advancement flaps were drawn incorporating the defect and placing the expected incisions between the helical rim and antihelix where possible.  The area thus outlined was incised through and through with a #15 scalpel blade.  With a skin hook and iris scissors, the flaps were gently and sharply undermined and freed up.

## 2021-06-16 NOTE — PROGRESS NOTE ADULT - PROBLEM SELECTOR PLAN 6
FMLA completed, faxed to [phone].  Left message and informed.   - Per paperwork from patient's group home, patient has a history of constipation. No clinical signs of obstruction.  - c/w Senna/Colace/Miraalax   -monitor BMS

## 2021-12-15 NOTE — SWALLOW BEDSIDE ASSESSMENT ADULT - SWALLOW EVAL: RECOMMENDED FEEDING/EATING TECHNIQUES
Chest pain
no straws/maintain upright posture during/after eating for 30 mins/position upright (90 degrees)/allow for swallow between intakes/small sips/bites

## 2022-02-05 NOTE — ED ADULT NURSE NOTE - CAS DISCH BELONGINGS RETURNED
END OF SHIFT NOTE:    Intake/Output  02/05 0701 - 02/05 1900  In: 480 [P.O.:480]  Out: 200 [Urine:200]   Voiding: YES  Catheter: NO- external catheter  Drain:              Stool:  0 occurrences. Emesis:  0 occurrences. VITAL SIGNS  Patient Vitals for the past 12 hrs:   Temp Pulse Resp BP SpO2   02/05/22 1515 97.8 °F (36.6 °C) (!) 112 19 123/61 93 %   02/05/22 1151 97.3 °F (36.3 °C) (!) 111 19 (!) 149/78 93 %   02/05/22 0839 -- -- -- -- 94 %   02/05/22 0834 -- -- -- -- 96 %   02/05/22 0820 97.6 °F (36.4 °C) (!) 102 19 132/69 97 %       Pain Assessment  Pain 1  Pain Scale 1: Numeric (0 - 10) (02/05/22 1445)  Pain Intensity 1: 0 (02/05/22 1445)  Patient Stated Pain Goal: 0 (02/05/22 1445)  Pain Reassessment 1: Patient resting w/respiratory rate greater than 10 (02/04/22 1900)  Pain Onset 1: last month (02/04/22 1805)  Pain Location 1: Rib cage (02/04/22 1805)  Pain Orientation 1:  Inner (02/04/22 1805)  Pain Description 1: Aching;Sore;Sharp (02/04/22 1805)  Pain Intervention(s) 1: Medication (see MAR) (02/04/22 1805)    Ambulating  No    Additional Information:    - ct chest and abd completed today  - oncology consulted today  - no complaints of pain    VIDA Chavez RN Yes

## 2022-06-27 NOTE — ED PROVIDER NOTE - OBJECTIVE STATEMENT
Detail Level: Zone
Pt is a 63 y/o M nonsmoker PMHx mild intellectual disability, mood disorder, psychotic disorder, h/o left hip replacement, anxiety p/w right hip and leg pain today.  Pt states he has had severe right hip pain which worsens with movement.  Pt states he can't walk 2/2 to pain.  Pt states he has not had any falls.   As per accompanying DSP, pt is usually ambulatory, but due to pain, pt refuses to walk and urinates on himself because he can't walk to bathroom.  Denies any fevers, chills, numbness, weakness, redness, swelling, falls.
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
## 2022-12-05 NOTE — ED PROVIDER NOTE - NSCAREINITIATED _GEN_ER
[facility]  [facility] [id number]  Phone: [phone]        December 9, 2022      Samia Pacheco was admitted to [facility] from 12/5/2022-  12/9/2022. He may return to light duty on Monday, 12/12/2022. Jordon Marin(Resident)

## 2023-01-16 NOTE — ED BEHAVIORAL HEALTH ASSESSMENT NOTE - IMPULSE CONTROL
[As Noted in HPI] : as noted in HPI [Anxiety] : anxiety [Negative] : Heme/Lymph [de-identified] : headaches Impaired

## 2023-03-15 NOTE — H&P ADULT - NSHPPHYSICALEXAM_GEN_ALL_CORE
Vital Signs Last 24 Hrs  T(C): 37.2 (25 Jan 2018 22:21), Max: 38.9 (25 Jan 2018 21:00)  T(F): 98.9 (25 Jan 2018 22:21), Max: 102 (25 Jan 2018 21:00)  HR: 81 (25 Jan 2018 22:21) (62 - 91)  BP: 106/47 (25 Jan 2018 22:21) (101/57 - 187/107)  BP(mean): --  RR: 19 (25 Jan 2018 22:21) (18 - 19)  SpO2: 97% (25 Jan 2018 22:21) (97% - 100%) not applicable pt. seen and examined    Vital Signs Last 24 Hrs  T(C): 37.2 (25 Jan 2018 22:21), Max: 38.9 (25 Jan 2018 21:00)  T(F): 98.9 (25 Jan 2018 22:21), Max: 102 (25 Jan 2018 21:00)  HR: 81 (25 Jan 2018 22:21) (62 - 91)  BP: 106/47 (25 Jan 2018 22:21) (101/57 - 187/107)  BP(mean): --  RR: 19 (25 Jan 2018 22:21) (18 - 19)  SpO2: 97% (25 Jan 2018 22:21) (97% - 100%)    heent: nc/at  neck: supple, no JVD  Lungs: B/L wheezing   heart: s1s2 nml  abd: soft, NABS  ext: no e/c/c  neuro: mental retardation, aaox 3

## 2023-03-31 NOTE — DISCHARGE NOTE ADULT - LAUNCH MEDICATION RECONCILIATION
Assessment/Plan:   Diagnoses and all orders for this visit:      Essential hypertension  -     Comprehensive metabolic panel; Future  -     Microalbumin / creatinine urine ratio  - BP stable in the office today   - dx in 12/2020 at [facility] - currently on ACEI-HCTZ 20-25mg QD and tolerating it well - cont current regimen   - (+) FHx of HTN and CAD   - script given for routine screening labs   - (+) former smoker - 1PPD/27yrs, quit in 1/2019  - RTO in 6months for f/u or sooner if with abnml labs - pt aware and agreeable     Former smoker  -     CT lung screening program; Future  Encounter for screening for lung cancer  -     CT lung screening program; Future    Transaminitis  - CMP with ALT 83 <-- 105  - discussed cutting back on EtOH intake   Excessive drinking alcohol    Elevated LDL cholesterol level  -     Lipid panel; Future  - LDL (3/11/2022): 132  - diet/exercise  - The 10-year ASCVD risk score (Lizbeth CHOU, et al , 2019) is: 5 1%    Values used to calculate the score:      Age: 48 years      Sex: Male      Is Non- : No      Diabetic: No      Tobacco smoker: No      Systolic Blood Pressure: 065 mmHg      Is BP treated: Yes      HDL Cholesterol: 50 mg/dL      Total Cholesterol: 214 mg/dL    Vitamin D deficiency  -     Vitamin D 25 hydroxy; Future    Prostate cancer screening encounter, options and risks discussed  Prostate cancer screening declined    Gastroesophageal reflux disease, unspecified whether esophagitis present  Irritable bowel syndrome with both constipation and diarrhea  Colon cancer screening  - has an appt with GI on 4/18/2023  - UTD with Cscope in 3/27/2021 - hemorrhoids - due again in 2031    Screening for deficiency anemia  -     CBC and differential; Future    Screening for thyroid disorder  -     TSH, 3rd generation with Free T4 reflex          Subjective:    Patient ID: Panda Almanza  is a 48 y o  male    Panda Almanza is a 48 y o  male who presents to "the office for an annual exam and f/u   1) Annual   - PMHx: HTN (dx in 12/2020 at [facility]), elevated LDL, GERD, former smoker, chicken pox as a child   - allergies: NKDA  - Meds: see med rec   - PSHx: wisdom teeth extraction   - FHx: M (hypotension), F (HTN, CAD and MI at 71yo), denies FHx of HL/DM, colon/prostate ca  - Immunizations: UTD with Tdap, COVID IMMs and Boosters x3, UTD annual Flu vaccine   - Hm: UTD with Cscope in 3/27/2021 - hemorrhoids - due again in 2031, declined prostate ca screening   - diet/exercise: exercise: walks, rides bike, cut out caffeine   - social: former smoker (1PPD/27yrs, quit in 1/2019), 8-10beers on the wknds, denies illicits   - sexual Hx: active with spouse, declined STD screening   - last vision: has reading glasses, follows with [facility] - last OV was 2months ago   - last dental: goes Q6months  2) HTN   - BP stable in the office today   - dx in 12/2020 at [facility] - currently on ACEI-HCTZ 20-25mg QD and tolerating it well   - denies F/C/N/V/CP/SOB/wheezing/cough/abd pain/D/urinary hesitancy or dribbling/hematuria/LE edema  - (+) former smoker - 1PPD/27yrs, quit in 1/2019 - overdue for Lung Ca screening   - has cut out caffeine   - (+) FHx of HTN and CAD       The following portions of the patient's history were reviewed and updated as appropriate: allergies, current medications, past family history, past medical history, past social history, past surgical history and problem list     Review of Systems  as per HPI    Objective:  /78   Pulse 58   Resp 16   Ht 5' 8\" (1 727 m)   Wt 85 3 kg (188 lb)   SpO2 98%   BMI 28 59 kg/m²    Physical Exam  Vitals reviewed  Constitutional:       General: He is not in acute distress  Appearance: Normal appearance  He is not ill-appearing, toxic-appearing or diaphoretic  HENT:      Head: Normocephalic and atraumatic        Right Ear: External ear normal       Left Ear: External ear normal       Nose: Nose normal  " Eyes:      General: No scleral icterus  Right eye: No discharge  Left eye: No discharge  Extraocular Movements: Extraocular movements intact  Conjunctiva/sclera: Conjunctivae normal    Cardiovascular:      Rate and Rhythm: Normal rate and regular rhythm  Heart sounds: Normal heart sounds  Pulmonary:      Effort: Pulmonary effort is normal  No respiratory distress  Breath sounds: Normal breath sounds  No stridor  No wheezing, rhonchi or rales  Abdominal:      Palpations: Abdomen is soft  Musculoskeletal:         General: Normal range of motion  Cervical back: Normal range of motion  Right lower leg: No edema  Left lower leg: No edema  Skin:     General: Skin is warm  Neurological:      General: No focal deficit present  Mental Status: He is alert and oriented to person, place, and time  Psychiatric:         Mood and Affect: Mood normal          Behavior: Behavior normal          BMI Counseling: Body mass index is 28 59 kg/m²  The BMI is above normal  Nutrition recommendations include 3-5 servings of fruits/vegetables daily  Exercise recommendations include exercising 3-5 times per week  Depression Screening Follow-up Plan: Patient's depression screening was positive with a PHQ-2 score of 0  Their PHQ-9 score was   Clinically patient does not have depression  No treatment is required  <<-----Click here for Discharge Medication Review

## 2023-05-10 NOTE — ED BEHAVIORAL HEALTH ASSESSMENT NOTE - NS ED BHA HOMICIDALITY PRESENT AGGRESSION OTHERS PAST MONTH
[facility] Call Center    Phone Message    May a detailed message be left on voicemail: yes     Reason for Call: Other: Mom called inquiring about   amphetamine-dextroamphetamine (ADDERALL) 15 MG tablet. They wanted to know if they can change the dosing time of the medication (still 30 MG a day)/    Action Taken: Other: p midb psychiatry    Travel Screening: Not Applicable                                                                       None known

## 2024-05-07 NOTE — DIETITIAN INITIAL EVALUATION ADULT. - PHYSICAL APPEARANCE
Quality 226: Preventive Care And Screening: Tobacco Use: Screening And Cessation Intervention: Patient screened for tobacco use and is an ex/non-smoker
Detail Level: Generalized
Quality 137: Melanoma: Continuity Of Care - Recall System: Patient information entered into a recall system that includes: target date for the next exam specified AND a process to follow up with patients regarding missed or unscheduled appointments
Quality 47: Advance Care Plan: Advance Care Planning discussed and documented; advance care plan or surrogate decision maker documented in the medical record.
underweight